# Patient Record
Sex: MALE | Race: WHITE | NOT HISPANIC OR LATINO | Employment: FULL TIME | URBAN - NONMETROPOLITAN AREA
[De-identification: names, ages, dates, MRNs, and addresses within clinical notes are randomized per-mention and may not be internally consistent; named-entity substitution may affect disease eponyms.]

---

## 2021-01-27 ENCOUNTER — HISTORICAL (OUTPATIENT)
Dept: ADMINISTRATIVE | Facility: HOSPITAL | Age: 66
End: 2021-01-27

## 2021-12-06 ENCOUNTER — HISTORICAL (OUTPATIENT)
Dept: ADMINISTRATIVE | Facility: HOSPITAL | Age: 66
End: 2021-12-06

## 2022-02-09 ENCOUNTER — HISTORICAL (OUTPATIENT)
Dept: ADMINISTRATIVE | Facility: HOSPITAL | Age: 67
End: 2022-02-09

## 2024-06-14 ENCOUNTER — HOSPITAL ENCOUNTER (INPATIENT)
Facility: HOSPITAL | Age: 69
LOS: 4 days | Discharge: ANOTHER HOSPITAL | DRG: 872 | End: 2024-06-18
Admitting: FAMILY MEDICINE
Payer: MEDICARE

## 2024-06-14 DIAGNOSIS — R65.20 SEVERE SEPSIS: Primary | ICD-10-CM

## 2024-06-14 DIAGNOSIS — R60.9 EDEMA: ICD-10-CM

## 2024-06-14 DIAGNOSIS — A41.9 SEVERE SEPSIS: Primary | ICD-10-CM

## 2024-06-14 DIAGNOSIS — R06.02 SHORTNESS OF BREATH: ICD-10-CM

## 2024-06-14 DIAGNOSIS — L03.116 CELLULITIS OF LEFT LOWER EXTREMITY: ICD-10-CM

## 2024-06-14 DIAGNOSIS — R06.00 DYSPNEA: ICD-10-CM

## 2024-06-14 LAB
ABS NEUT CALC (OHS): 24.15 X10(3)/MCL (ref 2.1–9.2)
ALBUMIN SERPL-MCNC: 4.5 G/DL (ref 3.4–5)
ALBUMIN/GLOB SERPL: 1.1 RATIO
ALP SERPL-CCNC: 69 UNIT/L (ref 50–144)
ALT SERPL-CCNC: 35 UNIT/L (ref 1–45)
AMORPH URATE CRY URNS QL MICRO: ABNORMAL /HPF
ANION GAP SERPL CALC-SCNC: 12 MEQ/L (ref 2–13)
AST SERPL-CCNC: 35 UNIT/L (ref 17–59)
BACTERIA #/AREA URNS AUTO: ABNORMAL /HPF
BASE EXCESS BLD CALC-SCNC: 0.9 MMOL/L (ref -2–2)
BILIRUB SERPL-MCNC: 1.2 MG/DL (ref 0–1)
BILIRUB UR QL STRIP.AUTO: NEGATIVE
BLOOD GAS SAMPLE TYPE (OHS): ABNORMAL
BNP BLD-MCNC: 3380 PG/ML (ref 0–124.9)
BUN SERPL-MCNC: 34 MG/DL (ref 7–20)
CALCIUM SERPL-MCNC: 9.2 MG/DL (ref 8.4–10.2)
CHLORIDE SERPL-SCNC: 101 MMOL/L (ref 98–110)
CLARITY UR: CLEAR
CO2 SERPL-SCNC: 24 MMOL/L (ref 21–32)
COHGB MFR BLDA: 1.4 % (ref 1–1.5)
COLOR UR AUTO: YELLOW
CREAT SERPL-MCNC: 1.69 MG/DL (ref 0.66–1.25)
CREAT/UREA NIT SERPL: 20 (ref 12–20)
D DIMER PPP IA.FEU-MCNC: 1.87 MG/L (ref 0.19–0.5)
ERYTHROCYTE [DISTWIDTH] IN BLOOD BY AUTOMATED COUNT: 13.3 %
GFR SERPLBLD CREATININE-BSD FMLA CKD-EPI: 43 ML/MIN/1.73/M2
GLOBULIN SER-MCNC: 4 GM/DL (ref 2–3.9)
GLUCOSE SERPL-MCNC: 166 MG/DL (ref 70–115)
GLUCOSE UR QL STRIP: NEGATIVE
HCO3 BLDA-SCNC: 24.6 MMOL/L (ref 23–28)
HCT VFR BLD AUTO: 45.1 % (ref 36–52)
HGB BLD-MCNC: 15.3 G/DL (ref 13–18)
HGB UR QL STRIP: ABNORMAL
HYALINE CASTS URNS QL MICRO: ABNORMAL /LPF
INHALED O2 CONCENTRATION: 21 %
INR PPP: 1.4
KETONES UR QL STRIP: NEGATIVE
LACTATE SERPL-SCNC: 2.3 MMOL/L (ref 0.4–2)
LACTATE SERPL-SCNC: 3.1 MMOL/L (ref 0.4–2)
LACTATE SERPL-SCNC: 3.3 MMOL/L (ref 0.4–2)
LEUKOCYTE ESTERASE UR QL STRIP: NEGATIVE
LYMPHOCYTES NFR BLD MANUAL: 2.1 X10(3)/MCL
LYMPHOCYTES NFR BLD MANUAL: 8 % (ref 20–55)
MAGNESIUM SERPL-MCNC: 1.8 MG/DL (ref 1.8–2.4)
MCH RBC QN AUTO: 31.8 PG (ref 27–34)
MCHC RBC AUTO-ENTMCNC: 33.9 G/DL (ref 31–37)
MCV RBC AUTO: 93.8 FL (ref 79–99)
METHGB MFR BLDA: 0.1 % (ref 0–1.5)
MUCOUS THREADS URNS QL MICRO: ABNORMAL /LPF
NEUTROPHILS NFR BLD MANUAL: 88 % (ref 37–73)
NEUTS BAND NFR BLD MANUAL: 4 % (ref 0–5)
NITRITE UR QL STRIP: NEGATIVE
NRBC BLD AUTO-RTO: 0.1 %
PCO2 BLDA: 36.2 MMHG (ref 41–51)
PH BLDA: 7.44 [PH] (ref 7.31–7.41)
PH UR STRIP: 5.5 [PH]
PLATELET # BLD AUTO: 197 X10(3)/MCL (ref 140–371)
PLATELET # BLD EST: ADEQUATE 10*3/UL
PMV BLD AUTO: 9.6 FL (ref 9.4–12.4)
PO2 BLDA: 36.4 MMHG (ref 30–40)
POCT GLUCOSE: 153 MG/DL (ref 70–110)
POTASSIUM SERPL-SCNC: 3.6 MMOL/L (ref 3.5–5.1)
PROT SERPL-MCNC: 8.5 GM/DL (ref 6.3–8.2)
PROT UR QL STRIP: 30
PROTHROMBIN TIME: 14.5 SECONDS (ref 9.3–11.9)
RBC # BLD AUTO: 4.81 X10(6)/MCL (ref 4–6)
RBC #/AREA URNS AUTO: ABNORMAL /HPF
RBC MORPH BLD: NORMAL
SAO2 % BLDA: 74.4 % (ref 60–80)
SMUDGE CELL (OLG): ABNORMAL
SODIUM SERPL-SCNC: 137 MMOL/L (ref 136–145)
SP GR UR STRIP.AUTO: 1.02 (ref 1–1.03)
SQUAMOUS #/AREA URNS AUTO: ABNORMAL /HPF
TROPONIN I SERPL-MCNC: 0.05 NG/ML (ref 0–0.03)
UROBILINOGEN UR STRIP-ACNC: 0.2
WBC # BLD AUTO: 26.25 X10(3)/MCL (ref 4–11.5)
WBC #/AREA URNS AUTO: ABNORMAL /HPF

## 2024-06-14 PROCEDURE — 83735 ASSAY OF MAGNESIUM: CPT | Performed by: FAMILY MEDICINE

## 2024-06-14 PROCEDURE — 25000003 PHARM REV CODE 250: Performed by: FAMILY MEDICINE

## 2024-06-14 PROCEDURE — 85610 PROTHROMBIN TIME: CPT | Performed by: FAMILY MEDICINE

## 2024-06-14 PROCEDURE — 21400001 HC TELEMETRY ROOM

## 2024-06-14 PROCEDURE — 63600175 PHARM REV CODE 636 W HCPCS: Performed by: FAMILY MEDICINE

## 2024-06-14 PROCEDURE — 83605 ASSAY OF LACTIC ACID: CPT

## 2024-06-14 PROCEDURE — 25000003 PHARM REV CODE 250

## 2024-06-14 PROCEDURE — 85027 COMPLETE CBC AUTOMATED: CPT | Performed by: FAMILY MEDICINE

## 2024-06-14 PROCEDURE — 93005 ELECTROCARDIOGRAM TRACING: CPT

## 2024-06-14 PROCEDURE — 94761 N-INVAS EAR/PLS OXIMETRY MLT: CPT | Mod: XB

## 2024-06-14 PROCEDURE — 36415 COLL VENOUS BLD VENIPUNCTURE: CPT

## 2024-06-14 PROCEDURE — 25000242 PHARM REV CODE 250 ALT 637 W/ HCPCS: Performed by: FAMILY MEDICINE

## 2024-06-14 PROCEDURE — 93010 ELECTROCARDIOGRAM REPORT: CPT | Mod: ,,, | Performed by: INTERNAL MEDICINE

## 2024-06-14 PROCEDURE — 80053 COMPREHEN METABOLIC PANEL: CPT | Performed by: FAMILY MEDICINE

## 2024-06-14 PROCEDURE — 81003 URINALYSIS AUTO W/O SCOPE: CPT | Performed by: FAMILY MEDICINE

## 2024-06-14 PROCEDURE — 81015 MICROSCOPIC EXAM OF URINE: CPT | Performed by: FAMILY MEDICINE

## 2024-06-14 PROCEDURE — 94640 AIRWAY INHALATION TREATMENT: CPT

## 2024-06-14 PROCEDURE — 25500020 PHARM REV CODE 255: Performed by: FAMILY MEDICINE

## 2024-06-14 PROCEDURE — 99900035 HC TECH TIME PER 15 MIN (STAT)

## 2024-06-14 PROCEDURE — 85379 FIBRIN DEGRADATION QUANT: CPT | Performed by: FAMILY MEDICINE

## 2024-06-14 PROCEDURE — 11000001 HC ACUTE MED/SURG PRIVATE ROOM

## 2024-06-14 PROCEDURE — 63600175 PHARM REV CODE 636 W HCPCS

## 2024-06-14 PROCEDURE — 87040 BLOOD CULTURE FOR BACTERIA: CPT | Performed by: FAMILY MEDICINE

## 2024-06-14 PROCEDURE — 82803 BLOOD GASES ANY COMBINATION: CPT

## 2024-06-14 PROCEDURE — 83605 ASSAY OF LACTIC ACID: CPT | Performed by: FAMILY MEDICINE

## 2024-06-14 PROCEDURE — 83880 ASSAY OF NATRIURETIC PEPTIDE: CPT | Performed by: FAMILY MEDICINE

## 2024-06-14 PROCEDURE — 84484 ASSAY OF TROPONIN QUANT: CPT | Performed by: FAMILY MEDICINE

## 2024-06-14 PROCEDURE — 99285 EMERGENCY DEPT VISIT HI MDM: CPT | Mod: 25

## 2024-06-14 PROCEDURE — 96365 THER/PROPH/DIAG IV INF INIT: CPT

## 2024-06-14 RX ORDER — ALBUTEROL SULFATE 90 UG/1
2 AEROSOL, METERED RESPIRATORY (INHALATION) EVERY 6 HOURS PRN
COMMUNITY

## 2024-06-14 RX ORDER — TAMSULOSIN HYDROCHLORIDE 0.4 MG/1
1 CAPSULE ORAL NIGHTLY
COMMUNITY
Start: 2024-06-07

## 2024-06-14 RX ORDER — FLUTICASONE FUROATE AND VILANTEROL TRIFENATATE 200; 25 UG/1; UG/1
1 POWDER RESPIRATORY (INHALATION) DAILY
COMMUNITY
Start: 2024-05-20

## 2024-06-14 RX ORDER — WARFARIN 2.5 MG/1
2.5 TABLET ORAL DAILY
COMMUNITY

## 2024-06-14 RX ORDER — INSULIN ASPART 100 [IU]/ML
0-5 INJECTION, SOLUTION INTRAVENOUS; SUBCUTANEOUS
Status: DISCONTINUED | OUTPATIENT
Start: 2024-06-14 | End: 2024-06-18 | Stop reason: HOSPADM

## 2024-06-14 RX ORDER — LISINOPRIL 10 MG/1
10 TABLET ORAL DAILY
Status: DISCONTINUED | OUTPATIENT
Start: 2024-06-15 | End: 2024-06-18 | Stop reason: HOSPADM

## 2024-06-14 RX ORDER — DEXTROMETHORPHAN HYDROBROMIDE, GUAIFENESIN 5; 100 MG/5ML; MG/5ML
650 LIQUID ORAL EVERY 8 HOURS
COMMUNITY

## 2024-06-14 RX ORDER — TORSEMIDE 20 MG/1
20 TABLET ORAL DAILY
Status: DISCONTINUED | OUTPATIENT
Start: 2024-06-15 | End: 2024-06-17

## 2024-06-14 RX ORDER — IBUPROFEN 200 MG
24 TABLET ORAL
Status: DISCONTINUED | OUTPATIENT
Start: 2024-06-14 | End: 2024-06-18 | Stop reason: HOSPADM

## 2024-06-14 RX ORDER — METOPROLOL SUCCINATE 50 MG/1
50 TABLET, EXTENDED RELEASE ORAL DAILY
Status: DISCONTINUED | OUTPATIENT
Start: 2024-06-15 | End: 2024-06-18 | Stop reason: HOSPADM

## 2024-06-14 RX ORDER — GLUCAGON 1 MG
1 KIT INJECTION
Status: DISCONTINUED | OUTPATIENT
Start: 2024-06-14 | End: 2024-06-18 | Stop reason: HOSPADM

## 2024-06-14 RX ORDER — TORSEMIDE 20 MG/1
20 TABLET ORAL DAILY
Status: ON HOLD | COMMUNITY
Start: 2024-06-06 | End: 2024-06-21 | Stop reason: HOSPADM

## 2024-06-14 RX ORDER — IBUPROFEN 400 MG/1
400 TABLET ORAL EVERY 6 HOURS PRN
Status: DISCONTINUED | OUTPATIENT
Start: 2024-06-14 | End: 2024-06-18 | Stop reason: HOSPADM

## 2024-06-14 RX ORDER — IBUPROFEN 200 MG
16 TABLET ORAL
Status: DISCONTINUED | OUTPATIENT
Start: 2024-06-14 | End: 2024-06-18 | Stop reason: HOSPADM

## 2024-06-14 RX ORDER — WARFARIN 2.5 MG/1
2.5 TABLET ORAL DAILY
Status: DISCONTINUED | OUTPATIENT
Start: 2024-06-15 | End: 2024-06-15

## 2024-06-14 RX ORDER — ATORVASTATIN CALCIUM 40 MG/1
40 TABLET, FILM COATED ORAL NIGHTLY
Status: DISCONTINUED | OUTPATIENT
Start: 2024-06-14 | End: 2024-06-18 | Stop reason: HOSPADM

## 2024-06-14 RX ORDER — ACETAMINOPHEN 325 MG/1
650 TABLET ORAL EVERY 8 HOURS PRN
Status: DISCONTINUED | OUTPATIENT
Start: 2024-06-14 | End: 2024-06-18 | Stop reason: HOSPADM

## 2024-06-14 RX ORDER — TAMSULOSIN HYDROCHLORIDE 0.4 MG/1
1 CAPSULE ORAL NIGHTLY
Status: DISCONTINUED | OUTPATIENT
Start: 2024-06-14 | End: 2024-06-18 | Stop reason: HOSPADM

## 2024-06-14 RX ORDER — ONDANSETRON HYDROCHLORIDE 2 MG/ML
4 INJECTION, SOLUTION INTRAVENOUS EVERY 8 HOURS PRN
Status: DISCONTINUED | OUTPATIENT
Start: 2024-06-14 | End: 2024-06-18 | Stop reason: HOSPADM

## 2024-06-14 RX ORDER — ALBUTEROL SULFATE 0.83 MG/ML
2.5 SOLUTION RESPIRATORY (INHALATION) EVERY 6 HOURS PRN
Status: DISCONTINUED | OUTPATIENT
Start: 2024-06-14 | End: 2024-06-18 | Stop reason: HOSPADM

## 2024-06-14 RX ORDER — CETIRIZINE HYDROCHLORIDE 10 MG/1
10 TABLET ORAL DAILY
Status: DISCONTINUED | OUTPATIENT
Start: 2024-06-15 | End: 2024-06-18 | Stop reason: HOSPADM

## 2024-06-14 RX ORDER — FLUTICASONE FUROATE AND VILANTEROL 200; 25 UG/1; UG/1
1 POWDER RESPIRATORY (INHALATION) DAILY
Status: DISCONTINUED | OUTPATIENT
Start: 2024-06-15 | End: 2024-06-14 | Stop reason: CLARIF

## 2024-06-14 RX ORDER — BUDESONIDE 0.5 MG/2ML
0.5 INHALANT ORAL EVERY 12 HOURS
Status: DISCONTINUED | OUTPATIENT
Start: 2024-06-14 | End: 2024-06-15

## 2024-06-14 RX ORDER — PROCHLORPERAZINE EDISYLATE 5 MG/ML
5 INJECTION INTRAMUSCULAR; INTRAVENOUS EVERY 6 HOURS PRN
Status: DISCONTINUED | OUTPATIENT
Start: 2024-06-14 | End: 2024-06-18 | Stop reason: HOSPADM

## 2024-06-14 RX ORDER — LISINOPRIL 10 MG/1
10 TABLET ORAL DAILY
Status: ON HOLD | COMMUNITY
Start: 2024-06-06 | End: 2024-06-21 | Stop reason: HOSPADM

## 2024-06-14 RX ORDER — HYDROCODONE BITARTRATE AND ACETAMINOPHEN 5; 325 MG/1; MG/1
1 TABLET ORAL EVERY 6 HOURS PRN
Status: DISCONTINUED | OUTPATIENT
Start: 2024-06-14 | End: 2024-06-18 | Stop reason: HOSPADM

## 2024-06-14 RX ORDER — METOPROLOL SUCCINATE 50 MG/1
50 TABLET, EXTENDED RELEASE ORAL DAILY
COMMUNITY
Start: 2024-06-06

## 2024-06-14 RX ORDER — ALBUTEROL SULFATE 90 UG/1
2 AEROSOL, METERED RESPIRATORY (INHALATION) EVERY 6 HOURS PRN
Status: DISCONTINUED | OUTPATIENT
Start: 2024-06-14 | End: 2024-06-14 | Stop reason: CLARIF

## 2024-06-14 RX ORDER — MINERAL OIL
180 ENEMA (ML) RECTAL DAILY
COMMUNITY

## 2024-06-14 RX ORDER — ARFORMOTEROL TARTRATE 15 UG/2ML
15 SOLUTION RESPIRATORY (INHALATION) 2 TIMES DAILY
Status: DISCONTINUED | OUTPATIENT
Start: 2024-06-14 | End: 2024-06-15

## 2024-06-14 RX ORDER — ATORVASTATIN CALCIUM 40 MG/1
40 TABLET, FILM COATED ORAL NIGHTLY
COMMUNITY
Start: 2024-06-06

## 2024-06-14 RX ADMIN — IBUPROFEN 400 MG: 400 TABLET, FILM COATED ORAL at 10:06

## 2024-06-14 RX ADMIN — SODIUM CHLORIDE, POTASSIUM CHLORIDE, SODIUM LACTATE AND CALCIUM CHLORIDE 1983 ML: 600; 310; 30; 20 INJECTION, SOLUTION INTRAVENOUS at 07:06

## 2024-06-14 RX ADMIN — PIPERACILLIN AND TAZOBACTAM 4.5 G: 4; .5 INJECTION, POWDER, FOR SOLUTION INTRAVENOUS; PARENTERAL at 05:06

## 2024-06-14 RX ADMIN — BUDESONIDE INHALATION 0.5 MG: 0.5 SUSPENSION RESPIRATORY (INHALATION) at 07:06

## 2024-06-14 RX ADMIN — TAMSULOSIN HYDROCHLORIDE 0.4 MG: 0.4 CAPSULE ORAL at 09:06

## 2024-06-14 RX ADMIN — ARFORMOTEROL TARTRATE 15 MCG: 15 SOLUTION RESPIRATORY (INHALATION) at 07:06

## 2024-06-14 RX ADMIN — ATORVASTATIN CALCIUM 40 MG: 40 TABLET, FILM COATED ORAL at 09:06

## 2024-06-14 RX ADMIN — SODIUM CHLORIDE 1983 ML: 9 INJECTION, SOLUTION INTRAVENOUS at 05:06

## 2024-06-14 RX ADMIN — VANCOMYCIN HYDROCHLORIDE 1500 MG: 1.5 INJECTION, POWDER, LYOPHILIZED, FOR SOLUTION INTRAVENOUS at 07:06

## 2024-06-14 RX ADMIN — VANCOMYCIN HYDROCHLORIDE 1000 MG: 1 INJECTION, POWDER, LYOPHILIZED, FOR SOLUTION INTRAVENOUS at 10:06

## 2024-06-14 RX ADMIN — IOHEXOL 60 ML: 350 INJECTION, SOLUTION INTRAVENOUS at 05:06

## 2024-06-14 NOTE — ED PROVIDER NOTES
Encounter Date: 6/14/2024       History     Chief Complaint   Patient presents with    Leg Swelling    Weakness    Shortness of Breath     Pt presented to ED per POV with c/o swelling to left lower leg, shortness of breath and generalized weakness onset one week ago. Pt was sent from PCP, Dr. Adalberto Unger for evaluation to r/o cellulitis vs DVT.     Patient presents with left leg erythema and swelling x1 week.  He is already on Coumadin for a history of atrial fibrillation.  He also relates nausea and vomiting for the past 3 days.  He had a single episode of diarrhea today he denies abdominal or chest pain.  He admits to mild shortness of breath.        Review of patient's allergies indicates:   Allergen Reactions    Cardizem [diltiazem hcl] Other (See Comments)     Past Medical History:   Diagnosis Date    A-fib     Allergy     Asthma     Kidney stone     Osteoarthritis     Osteoporosis      Past Surgical History:   Procedure Laterality Date    CATARACT EXTRACTION, BILATERAL       No family history on file.  Social History     Tobacco Use    Smoking status: Never   Substance Use Topics    Alcohol use: No    Drug use: No     Review of Systems   Constitutional:  Positive for appetite change and fatigue.   HENT: Negative.     Respiratory:  Positive for shortness of breath.    Gastrointestinal:  Positive for nausea and vomiting. Negative for abdominal pain.   Skin:  Positive for color change.       Physical Exam     Initial Vitals [06/14/24 1542]   BP Pulse Resp Temp SpO2   117/78 109 18 98.4 °F (36.9 °C) (!) 94 %      MAP       --         Physical Exam    Constitutional: He appears well-developed and well-nourished.   HENT:   Membranes   Cardiovascular:            Tachycardic, regular without murmurs gallops or rubs   Pulmonary/Chest:   Mildly diminished breath sounds.     Neurological: He is alert. He has normal strength.   Skin:   Left lower extremity with a erythema and increased warmth.  Areas of confluence as  well as swelling.  DP is 2+         ED Course   Critical Care    Date/Time: 6/14/2024 3:29 PM    Performed by: Pb Steele MD  Authorized by: Pb Steele MD  Direct patient critical care time: 18 minutes  Additional history critical care time: 8 minutes  Ordering / reviewing critical care time: 14 minutes  Documentation critical care time: 12 minutes  Consulting other physicians critical care time: 6 minutes  Consult with family critical care time: 5 minutes  Total critical care time (exclusive of procedural time) : 63 minutes  Critical care was necessary to treat or prevent imminent or life-threatening deterioration of the following conditions: sepsis.  Critical care was time spent personally by me on the following activities: development of treatment plan with patient or surrogate, discussions with primary provider, interpretation of cardiac output measurements, evaluation of patient's response to treatment, examination of patient, obtaining history from patient or surrogate, ordering and performing treatments and interventions, ordering and review of laboratory studies, ordering and review of radiographic studies, pulse oximetry and re-evaluation of patient's condition.        Labs Reviewed   COMPREHENSIVE METABOLIC PANEL - Abnormal; Notable for the following components:       Result Value    Glucose 166 (*)     Blood Urea Nitrogen 34 (*)     Creatinine 1.69 (*)     Protein Total 8.5 (*)     Globulin 4.0 (*)     Bilirubin Total 1.2 (*)     All other components within normal limits   NT-PRO NATRIURETIC PEPTIDE - Abnormal; Notable for the following components:    ProBNP 3,380.0 (*)     All other components within normal limits   D DIMER, QUANTITATIVE - Abnormal; Notable for the following components:    D-Dimer 1.87 (*)     All other components within normal limits   TROPONIN I - Abnormal; Notable for the following components:    Troponin-I 0.049 (*)     All other components within normal limits    CBC WITH DIFFERENTIAL - Abnormal; Notable for the following components:    WBC 26.25 (*)     All other components within normal limits   PROTIME-INR - Abnormal; Notable for the following components:    PT 14.5 (*)     All other components within normal limits    Narrative:     Protimes are used to monitor anticoagulant agents such as warfarin. PT INR values are based on the current patient normal mean and the SYLVIA value for the specific instrument reagent used.  **Routine theraputic target values for the INR are 2.0-3.0**   BLOOD GAS - Abnormal; Notable for the following components:    pH, Blood gas 7.442 (*)     pCO2, Blood gas 36.2 (*)     All other components within normal limits   LACTIC ACID, PLASMA - Abnormal; Notable for the following components:    Lactic Acid Level 2.3 (*)     All other components within normal limits   MANUAL DIFFERENTIAL - Abnormal; Notable for the following components:    Neutrophils % 88 (*)     Lymphs % 8 (*)     Neutrophils Abs Calc 24.15 (*)     Smudge Cells 1+ (*)     All other components within normal limits   URINALYSIS, REFLEX TO URINE CULTURE - Abnormal; Notable for the following components:    Protein, UA 30 (*)     Blood, UA Moderate (*)     All other components within normal limits    Narrative:      URINE STABILITY IS 2 HOURS AT ROOM TEMP OR    SIX HOURS REFRIGERATED. PERFORMING TESTING ON    SPECIMENS GREATER THAN THIS AGE MAY AFFECT THE    FOLLOWING TESTS:    PH          SPECIFIC GRAVITY           BLOOD    CLARITY     BILIRUBIN               UROBILINOGEN   MAGNESIUM - Normal   BLOOD CULTURE [facility]   BLOOD CULTURE [facility]   CBC W/ AUTO DIFFERENTIAL    Narrative:     The following orders were created for panel order CBC auto differential.  Procedure                               Abnormality         Status                     ---------                               -----------         ------                     CBC with Differential[[id number]]       Abnormal            Final  result               Manual Differential[[id number]]         Abnormal            Final result                 Please view results for these tests on the individual orders.   LACTIC ACID, PLASMA   URINALYSIS, MICROSCOPIC     EKG Readings: (Independently Interpreted)   Initial Reading: No STEMI. Rhythm: Sinus Tachycardia. Heart Rate: 102. Conduction: RBBB. ST Segments: Normal ST Segments. T Waves: Normal.       Imaging Results              CTA Chest Non-Coronary (PE Studies) (Final result)  Result time 06/14/24 17:37:49      Final result by Td Oliveira MD (06/14/24 17:37:49)                   Impression:      No evidence of acute pulmonary embolism is identified.    Fatty infiltration is noted in the liver.    Large hepatic cyst is again seen.    Remote mild wedge compression fracture of a midthoracic vertebral body is noted.      Electronically signed by: Td Oliveira  Date:    06/14/2024  Time:    17:37               Narrative:    EXAMINATION:  CTA CHEST NON CORONARY (PE STUDIES)    CLINICAL HISTORY:  Pulmonary embolism (PE) suspected, positive D-dimer;    TECHNIQUE:  Low dose axial images, sagittal and coronal reformations were obtained from the thoracic inlet to the lung bases following the IV administration of 100 mL of Omnipaque 350.  Contrast timing was optimized to evaluate the pulmonary arteries.  MIP images were performed.    Total DLP: 444 mGy.cm    Automatic exposure control was utilized to reduce the patient's dose    COMPARISON:  Chest x-ray dated February 9, 2022 and CT dated March 21, 2014.    FINDINGS:  No evidence of pulmonary embolism is identified on either side.    Elsewhere in the chest, the visualized portions of the thyroid lobes appear unremarkable.  No supraclavicular, axillary, mediastinal or hilar adenopathy identified.  The aorta appears normal in caliber.  The heart size is within normal limits.  No pericardial effusion is seen.    The lung fields appear clear.  No  pleural effusion or pneumothorax are noted.    The visualized portions of the upper abdomen demonstrate fatty infiltration of the liver.  A large hepatic cyst is again seen in the right lobe, similar to that noted on prior CT.  The adrenal glands are incompletely included on this examination but appear grossly normal.    Review of the bony structures demonstrates no acute abnormalities.  Degenerative changes are seen.  Remote compression fracture 1 the midthoracic vertebral bodies is again noted.                                       US Lower Extremity Veins Left (Final result)  Result time 06/14/24 16:54:28      Final result by Td Oliveira MD (06/14/24 16:54:28)                   Impression:      No evidence of deep venous thrombosis in the left lower extremity.      Electronically signed by: Td Oliveira  Date:    06/14/2024  Time:    16:54               Narrative:    EXAMINATION:  US LOWER EXTREMITY VEINS LEFT    CLINICAL HISTORY:  Edema, unspecified    TECHNIQUE:  Duplex and color flow Doppler evaluation and graded compression of the left lower extremity veins was performed.    COMPARISON:  December 6, 2021    FINDINGS:  Left thigh veins: The common femoral, femoral, popliteal, upper greater saphenous, and deep femoral veins are patent and free of thrombus. The veins are normally compressible and have normal phasic flow and augmentation response.    Left calf veins: The visualized calf veins are patent.    Miscellaneous: None                                       X-Ray Chest AP Portable (Preliminary result)  Result time 06/14/24 16:55:26      Wet Read by Pb Steele MD (06/14/24 16:55:26, [facility], Emergency Medicine)    No acute findings                                     Medications   sodium chloride 0.9% bolus 1,983 mL 1,983 mL (1,983 mLs Intravenous New Bag 6/14/24 1720)   piperacillin-tazobactam (ZOSYN) 4.5 g in dextrose 5 % in water (D5W) 100 mL IVPB  (MB+) (4.5 g Intravenous New Bag 6/14/24 1720)   iohexoL (OMNIPAQUE 350) injection 100 mL (60 mLs Intravenous Given 6/14/24 1713)     Medical Decision Making  Amount and/or Complexity of Data Reviewed  Labs: ordered.  Radiology: ordered and independent interpretation performed. Decision-making details documented in ED Course.    Risk  Decision regarding hospitalization.      Additional MDM:   Sepsis:   This patient does have evidence of infective focus  My overall impression is Severe sepsis.  Source: Skin and Soft Tissue (location LLE)  Antibiotics given- Antibiotics     Patient Encounter Information Not Found      Latest lactate reviewed- 2.3  Organ dysfunction indicated by none    Fluid challenge Ideal Body Weight- The patient's ideal body weight is [unfilled] which will be used to calculate fluid bolus of 30 ml/kg for treatment of septic shock.      Post- resuscitation assessment Yes Perfusion exam was performed within 6 hours of septic shock presentation after bolus shows Adequate tissue perfusion assessed by non-invasive monitoring       Will Not start Pressors- Levophed for MAP of 65  Source control achieved by: antibiotics                  ED Course as of 06/14/24 1748 Fri Jun 14, 2024   1740 CTA Chest Non-Coronary (PE Studies)  There is no evidence of a PE. [TM]      ED Course User Index  [TM] Demarco Ramon MD                           Clinical Impression:  Final diagnoses:  [R06.02] Shortness of breath  [R06.00] Dyspnea  [R60.9] Edema  [A41.9, R65.20] Severe sepsis (Primary)  [L03.116] Cellulitis of left lower extremity          ED Disposition Condition    Admit Stable                Demarco Ramon MD  06/14/24 2283

## 2024-06-14 NOTE — H&P
[facility]    History [facility] Physical      Patient Name: Reid Mcqueen  MRN: [id number]  Admission Date: 6/14/2024  Attending Physician: Reese Hong MD  Primary Care Provider: Adán Unger III, MD         Patient information was obtained from patient and ER records.     Subjective:     Principal Problem:Severe sepsis    Chief Complaint:   Chief Complaint   Patient presents with    Leg Swelling    Weakness    Shortness of Breath     Pt presented to ED per POV with c/o swelling to left lower leg, shortness of breath and generalized weakness onset one week ago. Pt was sent from PCP, Dr. Adlaberto Unger for evaluation to r/o cellulitis vs DVT.        HPI:   68 year old man with history of atrial fibrillation, COPD, HTN, BPH, HLD presented for lower extremity swelling/erythema for one week. Patient is on long term anticoagulation. Patient denies any trauma to bug bites in left lower extremity. Patient did report associated nausea/vomiting.     ED course: Blood cultures x2 obtained. CTA chest showed no PE. No DVT on US. Admitted for cellulitis     Past Medical History:   Diagnosis Date    A-fib     Allergy     Asthma     Kidney stone     Osteoarthritis     Osteoporosis        Past Surgical History:   Procedure Laterality Date    CATARACT EXTRACTION, BILATERAL         Review of patient's allergies indicates:   Allergen Reactions    Cardizem [diltiazem hcl] Other (See Comments)       No current facility-administered medications on file prior to encounter.     Current Outpatient Medications on File Prior to Encounter   Medication Sig    acetaminophen (TYLENOL) 650 MG TbSR Take 650 mg by mouth every 8 (eight) hours.    albuterol (PROVENTIL/VENTOLIN HFA) 90 mcg/actuation inhaler Inhale 2 puffs into the lungs every 6 (six) hours as needed for Wheezing. Rescue    atorvastatin (LIPITOR) 40 MG tablet Take 40 mg by mouth every evening.    BREO ELLIPTA 200-25 mcg/dose DsDv diskus inhaler Inhale 1  puff into the lungs once daily.    fexofenadine (ALLEGRA) 180 MG tablet Take 180 mg by mouth once daily.    lisinopriL 10 MG tablet Take 10 mg by mouth once daily.    metoprolol succinate (TOPROL-XL) 50 MG 24 hr tablet Take 50 mg by mouth once daily.    tamsulosin (FLOMAX) 0.4 mg Cap Take 1 capsule by mouth nightly.    torsemide (DEMADEX) 20 MG Tab Take 20 mg by mouth once daily.    warfarin (COUMADIN) 2.5 MG tablet Take 2.5 mg by mouth Daily.    [DISCONTINUED] alendronate (FOSAMAX) 70 MG tablet Take 70 mg by mouth every 7 days.    [DISCONTINUED] amoxicillin-clavulanate 875-125mg (AUGMENTIN) 875-125 mg per tablet Take 1 tablet by mouth 2 (two) times daily.    [DISCONTINUED] cyanocobalamin (VITAMIN B-12) 500 MCG tablet Take 500 mcg by mouth once daily.    [DISCONTINUED] fluticasone-salmeterol 100-50 mcg/dose (ADVAIR) 100-50 mcg/dose diskus inhaler Inhale 1 puff into the lungs 2 (two) times daily.    [DISCONTINUED] ipratropium (ATROVENT) 0.06 % nasal spray 2 sprays by Nasal route 4 (four) times daily as needed.    [DISCONTINUED] loratadine (CLARITIN) 10 mg tablet Take 10 mg by mouth once daily.    [DISCONTINUED] MULTIVITS,CA,MIN/IRON/FA/LYCOP (CENTRUM ULTRA MEN'S ORAL) Take 1 tablet by mouth once daily.    [DISCONTINUED] phenylephrine-pyrilamine (POLY HIST FORTE) 10-25 mg Tab Take 1 tablet by mouth 2 (two) times daily.     Family History    None       Tobacco Use    Smoking status: Never    Smokeless tobacco: Not on file   Substance and Sexual Activity    Alcohol use: No    Drug use: No    Sexual activity: Not on file     Review of Systems   All other systems reviewed and are negative.    Objective:     Vital Signs (Most Recent):  Temp: 98.4 °F (36.9 °C) (06/14/24 1742)  Pulse: 89 (06/14/24 1800)  Resp: (!) 25 (06/14/24 1800)  BP: 115/74 (06/14/24 1800)  SpO2: 96 % (06/14/24 1800) Vital Signs (24h Range):  Temp:  [98.4 °F (36.9 °C)-98.6 °F (37 °C)] 98.4 °F (36.9 °C)  Pulse:  [] 89  Resp:  [18-25] 25  SpO2:  [94  %-96 %] 96 %  BP: (106-123)/(61-78) 115/74     Weight: 118.9 kg (262 lb 2 oz)  Body mass index is 41.05 kg/m².    Physical Exam  Vitals reviewed. Exam conducted with a chaperone present.   Constitutional:       Appearance: Normal appearance. He is normal weight.   HENT:      Head: Normocephalic and atraumatic.      Nose: Nose normal.      Mouth/Throat:      Mouth: Mucous membranes are moist.      Pharynx: Oropharynx is clear.   Eyes:      Extraocular Movements: Extraocular movements intact.      Conjunctiva/sclera: Conjunctivae normal.      Pupils: Pupils are equal, round, and reactive to light.   Cardiovascular:      Rate and Rhythm: Normal rate and regular rhythm.      Pulses: Normal pulses.      Heart sounds: Normal heart sounds.   Pulmonary:      Effort: Pulmonary effort is normal.      Breath sounds: Normal breath sounds.   Abdominal:      General: Abdomen is flat. Bowel sounds are normal.      Palpations: Abdomen is soft.   Musculoskeletal:         General: Normal range of motion.      Cervical back: Normal range of motion and neck supple.   Skin:     Findings: Erythema present.   Neurological:      General: No focal deficit present.      Mental Status: He is alert. Mental status is at baseline.   Psychiatric:         Mood and Affect: Mood normal.         Thought Content: Thought content normal.           CRANIAL NERVES     CN III, IV, VI   Pupils are equal, round, and reactive to light.      Significant Labs: All pertinent labs within the past 24 hours have been reviewed.  Recent Lab Results  (Last 5 results in the past 24 hours)        06/14/24  1733   06/14/24  1624   06/14/24  1559   06/14/24  1556   06/14/24  1555        Albumin/Globulin Ratio         1.1       Neutrophils Abs Calc         24.15       Albumin         4.5       ALP         69       ALT         35       Amporphous Urate Crystals, UA Few               Anion Gap         12.0       Appearance, UA Clear               AST         35        Bacteria, UA Few               Bands         4       Bilirubin (UA) Negative               BILIRUBIN TOTAL         1.2       BUN         34       BUN/CREAT RATIO         20       Calcium         9.2       Chloride         101       CO2         24       Color, UA Yellow               Creatinine         1.69       D-Dimer         1.87       eGFR         43  Comment:                      EGFR INTERPRETATION    Beginning 8/15/22 we are reporting the eGFRcr calculation as recommended by the [employer] Kid[employer]. The eGFRcr equation has similar overall performance characteristics to the older equation, but the values may differ by more than 10% particularly at higher values of eGFRcr and younger adult ages.    NKF stages of chronic kidney disease (CKD)  Stage 1: Kidney damage with normal or increased eGFR (>90 mL/min/1.73 m^2)  Stage 2: Mild reduction in GFR (60-89 mL/min/1.73 m^2)  Stage 3a: Moderate reduction in GFR (45-59 mL/min/1.73 m^2)  Stage 3b: Moderate reduction in GFR (30-44 mL/min/1.73 m^2)  Stage 4: Severe reduction in GFR (15-29 mL/min/1.73 m^2)  Stage 5: Kidney failure (GFR <15 mL/min/1.73 m^2)           FIO2, Blood gas   21.0             Globulin, Total         4.0       Glucose         166       Glucose, UA Negative               Hematocrit         45.1       Hemoglobin         15.3       Hyaline Casts, UA Moderate               INR       1.4         Ketones, UA Negative               Lactic Acid Level     2.3           Leukocyte Esterase, UA Negative               Lymph #         2.1       Lymphocyte %         8       Magnesium          1.80       MCH         31.8       MCHC         33.9       MCV         93.8       MPV         9.6       Mucous, UA Small               Neutrophils Relative         88       NITRITE UA Negative               nRBC         0.1       Blood, UA Moderate               pH, UA 5.5               Platelet Estimate         Adequate       Platelet Count         197       Base  "Excess, Blood gas   0.90             CO Hgb   1.4             POC HCO3   24.6             Met Hgb   0.1             POC PCO2   36.2             POC PH   7.442             POC PO2   36.4             Potassium         3.6       ProBNP         3,380.0  Comment:   For ambulatory patients presenting to outpatient facilities with clinical suspicion of HF not previously diagnosed and at least one sign, symptom or risk factor for HF, NT-proBNP II test results should be interpreted as indicated below:    <125(pg/mL):"Negative: Heart Failure Unlikely"    >=125(pg/mL):"Consider Heart Failure as well as other causes of NT-proBNP elevation"             PROTEIN TOTAL         8.5       Protein, UA 30               PT       14.5         RBC         4.81       RBC Morph         Normal       RBC, UA 0-5               RDW         13.3       Sample Type   Venous Blood             Smudge Cells         1+       sO2, Blood gas   74.4             Sodium         137       Specific Gravity,UA 1.025               Squam Epithel, UA Few               Troponin I         0.049       Urobilinogen, UA 0.2               WBC, UA 0-5               WBC         26.25                              Significant Imaging: I have reviewed all pertinent imaging results/findings within the past 24 hours.  I have reviewed and interpreted all pertinent imaging results/findings within the past 24 hours.    Assessment/Plan:     Active Diagnoses:    Diagnosis Date Noted POA    PRINCIPAL PROBLEM:  Severe sepsis [A41.9, R65.20] 06/14/2024 Unknown    Cellulitis of left lower extremity [L03.116] 06/14/2024 Unknown    Dyspnea [R06.00] 06/14/2024 Unknown      Problems Resolved During this Admission:     VTE Risk Mitigation (From admission, onward)      None            *Sepsis 2/2 LLE cellulitis  - Blood cultures x2 obtained  - Cardiac monitoring   - Started on vanc/zosyn     *Dyspnea on exertion  - CTA chest showed no PE  - Cardiac monitoring   - Ordered echocardiogram   - " Continue torsemide for now     *HLD  - Resumed home statin    *DM2  - ISS while in house, goal glucose 140-180    *HTN  - Resumed home lisinopril and metoprolol    *BPH  - Resumed home flomax    *Atrial Fibrillation  - Resumed home xarelto and BB  - Cardiac monitoring     Code Status: Full Code  Diet: Cardiac  DVT PPX: Warfarin    Services provided via two way audio/visual telecommunication  Provider location: [place], [place]  Patient location: RACHAEL Ponce MD  Department of Hospital Medicine   [facility]

## 2024-06-14 NOTE — ED NOTES
A new connect request was successfully created for:  Reid Mcqueen  : 1955  MRN: [id number]  ConnectID: [id number]  REASON:  Admit: non-ICU  ACUITY:  10 Minutes  SUBMITTED:  2024 17:45 CDT

## 2024-06-15 LAB
ALBUMIN SERPL-MCNC: 3.3 G/DL (ref 3.4–5)
ALBUMIN/GLOB SERPL: 1 RATIO
ALP SERPL-CCNC: 61 UNIT/L (ref 50–144)
ALT SERPL-CCNC: 24 UNIT/L (ref 1–45)
ANION GAP SERPL CALC-SCNC: 6 MEQ/L (ref 2–13)
AST SERPL-CCNC: 32 UNIT/L (ref 17–59)
BASOPHILS # BLD AUTO: 0.03 X10(3)/MCL (ref 0.01–0.08)
BASOPHILS NFR BLD AUTO: 0.1 % (ref 0.1–1.2)
BILIRUB SERPL-MCNC: 1.5 MG/DL (ref 0–1)
BUN SERPL-MCNC: 28 MG/DL (ref 7–20)
CALCIUM SERPL-MCNC: 8 MG/DL (ref 8.4–10.2)
CHLORIDE SERPL-SCNC: 103 MMOL/L (ref 98–110)
CO2 SERPL-SCNC: 24 MMOL/L (ref 21–32)
CREAT SERPL-MCNC: 1.43 MG/DL (ref 0.66–1.25)
CREAT/UREA NIT SERPL: 20 (ref 12–20)
EOSINOPHIL # BLD AUTO: 0 X10(3)/MCL (ref 0.04–0.54)
EOSINOPHIL NFR BLD AUTO: 0 % (ref 0.7–7)
ERYTHROCYTE [DISTWIDTH] IN BLOOD BY AUTOMATED COUNT: 13.8 %
GFR SERPLBLD CREATININE-BSD FMLA CKD-EPI: 53 ML/MIN/1.73/M2
GLOBULIN SER-MCNC: 3.3 GM/DL (ref 2–3.9)
GLUCOSE SERPL-MCNC: 154 MG/DL (ref 70–115)
HCT VFR BLD AUTO: 38.3 % (ref 36–52)
HGB BLD-MCNC: 12.7 G/DL (ref 13–18)
IMM GRANULOCYTES # BLD AUTO: 0.1 X10(3)/MCL (ref 0–0.03)
IMM GRANULOCYTES NFR BLD AUTO: 0.5 % (ref 0–0.5)
LYMPHOCYTES # BLD AUTO: 1.28 X10(3)/MCL (ref 1.32–3.57)
LYMPHOCYTES NFR BLD AUTO: 5.9 % (ref 20–55)
MCH RBC QN AUTO: 31.8 PG (ref 27–34)
MCHC RBC AUTO-ENTMCNC: 33.2 G/DL (ref 31–37)
MCV RBC AUTO: 95.8 FL (ref 79–99)
MONOCYTES # BLD AUTO: 0.9 X10(3)/MCL (ref 0.3–0.82)
MONOCYTES NFR BLD AUTO: 4.2 % (ref 4.7–12.5)
NEUTROPHILS # BLD AUTO: 19.21 X10(3)/MCL (ref 1.78–5.38)
NEUTROPHILS NFR BLD AUTO: 89.3 % (ref 37–73)
NRBC BLD AUTO-RTO: 0 %
PLATELET # BLD AUTO: 158 X10(3)/MCL (ref 140–371)
PMV BLD AUTO: 9.9 FL (ref 9.4–12.4)
POTASSIUM SERPL-SCNC: 3.9 MMOL/L (ref 3.5–5.1)
PROT SERPL-MCNC: 6.6 GM/DL (ref 6.3–8.2)
RBC # BLD AUTO: 4 X10(6)/MCL (ref 4–6)
SODIUM SERPL-SCNC: 133 MMOL/L (ref 136–145)
VANCOMYCIN SERPL-MCNC: 9.6 UG/ML (ref 5–10)
WBC # BLD AUTO: 21.52 X10(3)/MCL (ref 4–11.5)

## 2024-06-15 PROCEDURE — 25000003 PHARM REV CODE 250: Performed by: INTERNAL MEDICINE

## 2024-06-15 PROCEDURE — 63600175 PHARM REV CODE 636 W HCPCS: Performed by: FAMILY MEDICINE

## 2024-06-15 PROCEDURE — 21400001 HC TELEMETRY ROOM

## 2024-06-15 PROCEDURE — 94640 AIRWAY INHALATION TREATMENT: CPT

## 2024-06-15 PROCEDURE — 99900035 HC TECH TIME PER 15 MIN (STAT)

## 2024-06-15 PROCEDURE — 25000003 PHARM REV CODE 250

## 2024-06-15 PROCEDURE — 80202 ASSAY OF VANCOMYCIN: CPT | Performed by: FAMILY MEDICINE

## 2024-06-15 PROCEDURE — 11000001 HC ACUTE MED/SURG PRIVATE ROOM

## 2024-06-15 PROCEDURE — 63600175 PHARM REV CODE 636 W HCPCS: Mod: JZ,JG | Performed by: INTERNAL MEDICINE

## 2024-06-15 PROCEDURE — 80053 COMPREHEN METABOLIC PANEL: CPT

## 2024-06-15 PROCEDURE — 25000242 PHARM REV CODE 250 ALT 637 W/ HCPCS: Performed by: FAMILY MEDICINE

## 2024-06-15 PROCEDURE — 94761 N-INVAS EAR/PLS OXIMETRY MLT: CPT

## 2024-06-15 PROCEDURE — 25000003 PHARM REV CODE 250: Performed by: FAMILY MEDICINE

## 2024-06-15 PROCEDURE — 63600175 PHARM REV CODE 636 W HCPCS

## 2024-06-15 PROCEDURE — 85025 COMPLETE CBC W/AUTO DIFF WBC: CPT

## 2024-06-15 PROCEDURE — 36415 COLL VENOUS BLD VENIPUNCTURE: CPT

## 2024-06-15 PROCEDURE — 36415 COLL VENOUS BLD VENIPUNCTURE: CPT | Performed by: FAMILY MEDICINE

## 2024-06-15 RX ORDER — WARFARIN 2.5 MG/1
2.5 TABLET ORAL DAILY
Status: DISCONTINUED | OUTPATIENT
Start: 2024-06-15 | End: 2024-06-18 | Stop reason: HOSPADM

## 2024-06-15 RX ORDER — FLUTICASONE FUROATE AND VILANTEROL 200; 25 UG/1; UG/1
1 POWDER RESPIRATORY (INHALATION) DAILY
Status: DISCONTINUED | OUTPATIENT
Start: 2024-06-16 | End: 2024-06-18 | Stop reason: HOSPADM

## 2024-06-15 RX ORDER — CLINDAMYCIN PHOSPHATE 600 MG/50ML
600 INJECTION, SOLUTION INTRAVENOUS
Status: DISCONTINUED | OUTPATIENT
Start: 2024-06-15 | End: 2024-06-18 | Stop reason: HOSPADM

## 2024-06-15 RX ORDER — FLUTICASONE FUROATE AND VILANTEROL 200; 25 UG/1; UG/1
1 POWDER RESPIRATORY (INHALATION) DAILY
Status: DISCONTINUED | OUTPATIENT
Start: 2024-06-15 | End: 2024-06-15

## 2024-06-15 RX ADMIN — BUDESONIDE INHALATION 0.5 MG: 0.5 SUSPENSION RESPIRATORY (INHALATION) at 07:06

## 2024-06-15 RX ADMIN — TORSEMIDE 20 MG: 20 TABLET ORAL at 09:06

## 2024-06-15 RX ADMIN — VANCOMYCIN HYDROCHLORIDE 1250 MG: 1.25 INJECTION, POWDER, LYOPHILIZED, FOR SOLUTION INTRAVENOUS at 11:06

## 2024-06-15 RX ADMIN — TAMSULOSIN HYDROCHLORIDE 0.4 MG: 0.4 CAPSULE ORAL at 08:06

## 2024-06-15 RX ADMIN — CLINDAMYCIN PHOSPHATE 600 MG: 600 INJECTION, SOLUTION INTRAVENOUS at 05:06

## 2024-06-15 RX ADMIN — HYDROCODONE BITARTRATE AND ACETAMINOPHEN 1 TABLET: 5; 325 TABLET ORAL at 10:06

## 2024-06-15 RX ADMIN — VANCOMYCIN HYDROCHLORIDE 1250 MG: 1.25 INJECTION, POWDER, LYOPHILIZED, FOR SOLUTION INTRAVENOUS at 01:06

## 2024-06-15 RX ADMIN — PIPERACILLIN AND TAZOBACTAM 4.5 G: 4; .5 INJECTION, POWDER, FOR SOLUTION INTRAVENOUS; PARENTERAL at 04:06

## 2024-06-15 RX ADMIN — ACETAMINOPHEN 650 MG: 325 TABLET, FILM COATED ORAL at 02:06

## 2024-06-15 RX ADMIN — PIPERACILLIN AND TAZOBACTAM 4.5 G: 4; .5 INJECTION, POWDER, FOR SOLUTION INTRAVENOUS; PARENTERAL at 08:06

## 2024-06-15 RX ADMIN — CLINDAMYCIN PHOSPHATE 600 MG: 600 INJECTION, SOLUTION INTRAVENOUS at 10:06

## 2024-06-15 RX ADMIN — ARFORMOTEROL TARTRATE 15 MCG: 15 SOLUTION RESPIRATORY (INHALATION) at 07:06

## 2024-06-15 RX ADMIN — METOPROLOL SUCCINATE 50 MG: 50 TABLET, EXTENDED RELEASE ORAL at 09:06

## 2024-06-15 RX ADMIN — PIPERACILLIN AND TAZOBACTAM 4.5 G: 4; .5 INJECTION, POWDER, FOR SOLUTION INTRAVENOUS; PARENTERAL at 12:06

## 2024-06-15 RX ADMIN — LISINOPRIL 10 MG: 10 TABLET ORAL at 09:06

## 2024-06-15 RX ADMIN — ATORVASTATIN CALCIUM 40 MG: 40 TABLET, FILM COATED ORAL at 08:06

## 2024-06-15 RX ADMIN — CETIRIZINE HYDROCHLORIDE 10 MG: 10 TABLET, FILM COATED ORAL at 09:06

## 2024-06-15 RX ADMIN — WARFARIN SODIUM 2.5 MG: 2.5 TABLET ORAL at 10:06

## 2024-06-15 NOTE — SUBJECTIVE & OBJECTIVE
Interval History:     Review of Systems   Constitutional:  Positive for fever. Negative for activity change, appetite change and fatigue.   HENT:  Negative for congestion, ear pain, nosebleeds, sinus pressure, sore throat, tinnitus and trouble swallowing.    Eyes:  Negative for pain and itching.   Respiratory:  Negative for apnea, cough, choking, chest tightness, shortness of breath and wheezing.    Cardiovascular:  Negative for chest pain and leg swelling.   Gastrointestinal:  Negative for abdominal distention, abdominal pain, constipation, diarrhea, nausea and vomiting.   Endocrine: Negative for cold intolerance and heat intolerance.   Genitourinary:  Negative for dysuria, enuresis, frequency, penile swelling, scrotal swelling, testicular pain and urgency.   Musculoskeletal:  Negative for arthralgias, back pain, gait problem, joint swelling, myalgias and neck pain.   Skin:  Positive for color change and rash. Negative for pallor.   Allergic/Immunologic: Negative for environmental allergies and food allergies.   Neurological:  Negative for dizziness, tremors, seizures, syncope, numbness and headaches.   Psychiatric/Behavioral:  Negative for agitation, behavioral problems, dysphoric mood, hallucinations, self-injury and suicidal ideas. The patient is not nervous/anxious and is not hyperactive.      Objective:     Vital Signs (Most Recent):  Temp: 97.6 °F (36.4 °C) (06/15/24 0734)  Pulse: 85 (06/15/24 0745)  Resp: 16 (06/15/24 0745)  BP: 102/65 (06/15/24 0734)  SpO2: 96 % (06/15/24 0745) Vital Signs (24h Range):  Temp:  [97.4 °F (36.3 °C)-101.9 °F (38.8 °C)] 97.6 °F (36.4 °C)  Pulse:  [] 85  Resp:  [16-26] 16  SpO2:  [91 %-97 %] 96 %  BP: ()/(54-78) 102/65     Weight: 115.9 kg (255 lb 9.6 oz)  Body mass index is 40.03 kg/m².    Intake/Output Summary (Last 24 hours) at 6/15/2024 0803  Last data filed at 6/15/2024 0734  Gross per 24 hour   Intake 1080 ml   Output --   Net 1080 ml         Physical  Exam  Constitutional:       General: He is not in acute distress.     Appearance: Normal appearance. He is ill-appearing. He is not toxic-appearing or diaphoretic.   HENT:      Head: Normocephalic and atraumatic.      Right Ear: External ear normal.      Left Ear: External ear normal.      Nose: Nose normal. No congestion or rhinorrhea.      Mouth/Throat:      Mouth: Mucous membranes are moist.      Pharynx: Oropharynx is clear.   Eyes:      Extraocular Movements: Extraocular movements intact.      Conjunctiva/sclera: Conjunctivae normal.      Pupils: Pupils are equal, round, and reactive to light.   Neck:      Vascular: No carotid bruit.   Cardiovascular:      Rate and Rhythm: Normal rate and regular rhythm.      Pulses: Normal pulses.      Heart sounds: Normal heart sounds. No murmur heard.  Pulmonary:      Effort: Pulmonary effort is normal. No respiratory distress.      Breath sounds: Normal breath sounds. No wheezing, rhonchi or rales.   Abdominal:      General: Abdomen is flat. Bowel sounds are normal. There is no distension.      Palpations: Abdomen is soft.      Tenderness: There is no abdominal tenderness. There is no guarding.   Musculoskeletal:         General: Swelling present. No tenderness. Normal range of motion.      Cervical back: Normal range of motion and neck supple. No tenderness.      Right lower leg: No edema.      Left lower leg: No edema.   Lymphadenopathy:      Cervical: No cervical adenopathy.   Skin:     General: Skin is warm and dry.      Coloration: Skin is not jaundiced or pale.      Findings: Erythema present.   Neurological:      General: No focal deficit present.      Mental Status: He is alert and oriented to person, place, and time. Mental status is at baseline.      Cranial Nerves: No cranial nerve deficit.      Motor: No weakness.      Coordination: Coordination normal.      Gait: Gait normal.   Psychiatric:         Mood and Affect: Mood normal.         Behavior: Behavior  normal.         Thought Content: Thought content normal.         Judgment: Judgment normal.             Significant Labs: All pertinent labs within the past 24 hours have been reviewed.    Significant Imaging: I have reviewed all pertinent imaging results/findings within the past 24 hours.

## 2024-06-15 NOTE — PROGRESS NOTES
Pharmacokinetic Initial Assessment: IV Vancomycin    Assessment/Plan:    Initiate intravenous vancomycin with loading dose of 2500 mg once followed by a maintenance dose of vancomycin 1250mg IV every 12 hours  Desired empiric serum trough concentration is 10 to 15 mcg/mL  Draw vancomycin 6-16-24 at 1130AM  Pharmacy will continue to follow and monitor vancomycin.      Please contact pharmacy at extension [phone] with any questions regarding this assessment.     Thank you for the consult,   Tam Courtney       Patient brief summary:  Reid Mcqueen is a 68 y.o. male initiated on antimicrobial therapy with IV Vancomycin for treatment of suspected skin & soft tissue infection    Drug Allergies:   Review of patient's allergies indicates:   Allergen Reactions    Cardizem [diltiazem hcl] Other (See Comments)       Actual Body Weight:   116KG    Renal Function:   Estimated Creatinine Clearance: 59.3 mL/min (A) (based on SCr of 1.43 mg/dL (H)).,     Dialysis Method (if applicable):  N/A    CBC (last 72 hours):  Recent Labs   Lab Result Units 06/14/24  1555 06/15/24  0715   WBC x10(3)/mcL 26.25* 21.52*   Hgb g/dL 15.3 12.7*   Hct % 45.1 38.3   Platelet x10(3)/mcL 197 158   Mono % %  --  4.2*   Eos % %  --  0.0*   Basophil % %  --  0.1       Metabolic Panel (last 72 hours):  Recent Labs   Lab Result Units 06/14/24  1555 06/14/24  1733 06/15/24  0430   Sodium mmol/L 137  --  133*   Potassium mmol/L 3.6  --  3.9   Chloride mmol/L 101  --  103   CO2 mmol/L 24  --  24   Glucose mg/dL 166*  --  154*   Glucose, UA   --  Negative  --    Blood Urea Nitrogen mg/dL 34*  --  28*   Creatinine mg/dL 1.69*  --  1.43*   Albumin g/dL 4.5  --  3.3*   Bilirubin Total mg/dL 1.2*  --  1.5*   ALP unit/L 69  --  61   AST unit/L 35  --  32   ALT unit/L 35  --  24   Magnesium Level mg/dL 1.80  --   --        Drug levels (last 3 results):  Recent Labs   Lab Result Units 06/15/24  1125   Vancomycin Random ug/ml 9.6       Microbiologic  Results:  Microbiology Results (last 7 days)       Procedure Component Value Units Date/Time    Blood Culture x two cultures. Draw prior to antibiotics [[id number]] Collected: 06/14/24 1756    Order Status: Resulted Specimen: Blood Updated: 06/14/24 1758    Blood Culture x two cultures. Draw prior to antibiotics [[id number]] Collected: 06/14/24 1756    Order Status: Resulted Specimen: Blood Updated: 06/14/24 1758             Detail Level: Detailed

## 2024-06-15 NOTE — PROGRESS NOTES
"Pharmacokinetic Initial Assessment: IV Vancomycin    Assessment/Plan:    Initiate intravenous vancomycin with loading dose of 2500 mg once with subsequent doses when random concentrations are less than 20 mcg/mL  Desired empiric serum trough concentration is 10 to 20 mcg/mL  Draw vancomycin random level on 6/15 at 1100.  Pharmacy will continue to follow and monitor vancomycin.      Please contact pharmacy with any questions regarding this assessment.     Thank you for the consult,   Harper Josh       Patient brief summary:  Reid Mcqueen is a 68 y.o. male initiated on antimicrobial therapy with IV Vancomycin for treatment of suspected skin & soft tissue infection    Drug Allergies:   Review of patient's allergies indicates:   Allergen Reactions    Cardizem [diltiazem hcl] Other (See Comments)       Actual Body Weight:   115.9kg    Renal Function:   Estimated Creatinine Clearance: 50.2 mL/min (A) (based on SCr of 1.69 mg/dL (H)).,     Dialysis Method (if applicable):  N/A    CBC (last 72 hours):  Recent Labs   Lab Result Units 06/14/24  1555   WBC x10(3)/mcL 26.25*   Hgb g/dL 15.3   Hct % 45.1   Platelet x10(3)/mcL 197       Metabolic Panel (last 72 hours):  Recent Labs   Lab Result Units 06/14/24  1555 06/14/24  1733   Sodium mmol/L 137  --    Potassium mmol/L 3.6  --    Chloride mmol/L 101  --    CO2 mmol/L 24  --    Glucose mg/dL 166*  --    Glucose, UA   --  Negative   Blood Urea Nitrogen mg/dL 34*  --    Creatinine mg/dL 1.69*  --    Albumin g/dL 4.5  --    Bilirubin Total mg/dL 1.2*  --    ALP unit/L 69  --    AST unit/L 35  --    ALT unit/L 35  --    Magnesium Level mg/dL 1.80  --        Drug levels (last 3 results):  No results for input(s): "VANCOMYCINRA", "VANCORANDOM", "VANCOMYCINPE", "VANCOPEAK", "VANCOMYCINTR", "VANCOTROUGH" in the last 72 hours.    Microbiologic Results:  Microbiology Results (last 7 days)       Procedure Component Value Units Date/Time    Blood Culture x two cultures. Draw prior " to antibiotics [[id number]] Collected: 06/14/24 1756    Order Status: Resulted Specimen: Blood Updated: 06/14/24 1758    Blood Culture x two cultures. Draw prior to antibiotics [[id number]] Collected: 06/14/24 1756    Order Status: Resulted Specimen: Blood Updated: 06/14/24 1758

## 2024-06-15 NOTE — PLAN OF CARE
Problem: Adult Inpatient Plan of Care  Goal: Plan of Care Review  Outcome: Progressing  Goal: Patient-Specific Goal (Individualized)  Outcome: Progressing  Goal: Absence of Hospital-Acquired Illness or Injury  Outcome: Progressing  Goal: Optimal Comfort and Wellbeing  Outcome: Progressing  Goal: Readiness for Transition of Care  Outcome: Progressing     Problem: Bariatric Environmental Safety  Goal: Safety Maintained with Care  Outcome: Progressing     Problem: Sepsis/Septic Shock  Goal: Optimal Coping  Outcome: Progressing  Goal: Absence of Bleeding  Outcome: Progressing  Goal: Blood Glucose Level Within Targeted Range  Outcome: Progressing  Goal: Absence of Infection Signs and Symptoms  Outcome: Progressing  Goal: Optimal Nutrition Intake  Outcome: Progressing     Problem: Fall Injury Risk  Goal: Absence of Fall and Fall-Related Injury  Outcome: Progressing     Problem: Wound  Goal: Optimal Coping  Outcome: Progressing  Goal: Optimal Functional Ability  Outcome: Progressing  Goal: Absence of Infection Signs and Symptoms  Outcome: Progressing  Goal: Improved Oral Intake  Outcome: Progressing  Goal: Optimal Pain Control and Function  Outcome: Progressing  Goal: Skin Health and Integrity  Outcome: Progressing  Goal: Optimal Wound Healing  Outcome: Progressing     Problem: Skin or Soft Tissue Infection  Goal: Absence of Infection Signs and Symptoms  Outcome: Progressing

## 2024-06-15 NOTE — NURSING
Patient arrived to the floor via stretcher accompanied by transport personnel. Patient in stable condition and denies further needs at this time.

## 2024-06-15 NOTE — PROGRESS NOTES
[facility] Medicine  Progress Note    Patient Name: Reid Mcqueen  MRN: [id number]  Patient Class: IP- Inpatient   Admission Date: 6/14/2024  Length of Stay: 1 days  Attending Physician: Adrian Milian MD  Primary Care Provider: Adán Unger III, MD        Subjective:     Principal Problem:Severe sepsis        HPI:  68 year old man with history of atrial fibrillation, COPD, HTN, BPH, HLD presented for lower extremity swelling/erythema for one week. Patient is on long term anticoagulation. Patient denies any trauma to bug bites in left lower extremity. Patient did report associated nausea/vomiting.      ED course: Blood cultures x2 obtained. CTA chest showed no PE. No DVT on US. Admitted for cellulitis           Past Medical History:   Diagnosis Date    A-fib      Allergy      Asthma      Kidney stone      Osteoarthritis      Osteoporosis        Overview/Hospital Course:  06/15/2024 patient in with severe cellulitis left lower extremity he states it continues to hurt severely redness has not changed much overnight.  We will continue his antibiotics follow-up blood cultures.  Ultrasound negative for DVT.    Interval History:     Review of Systems   Constitutional:  Positive for fever. Negative for activity change, appetite change and fatigue.   HENT:  Negative for congestion, ear pain, nosebleeds, sinus pressure, sore throat, tinnitus and trouble swallowing.    Eyes:  Negative for pain and itching.   Respiratory:  Negative for apnea, cough, choking, chest tightness, shortness of breath and wheezing.    Cardiovascular:  Negative for chest pain and leg swelling.   Gastrointestinal:  Negative for abdominal distention, abdominal pain, constipation, diarrhea, nausea and vomiting.   Endocrine: Negative for cold intolerance and heat intolerance.   Genitourinary:  Negative for dysuria, enuresis, frequency, penile swelling, scrotal swelling, testicular pain and urgency.    Musculoskeletal:  Negative for arthralgias, back pain, gait problem, joint swelling, myalgias and neck pain.   Skin:  Positive for color change and rash. Negative for pallor.   Allergic/Immunologic: Negative for environmental allergies and food allergies.   Neurological:  Negative for dizziness, tremors, seizures, syncope, numbness and headaches.   Psychiatric/Behavioral:  Negative for agitation, behavioral problems, dysphoric mood, hallucinations, self-injury and suicidal ideas. The patient is not nervous/anxious and is not hyperactive.      Objective:     Vital Signs (Most Recent):  Temp: 97.6 °F (36.4 °C) (06/15/24 0734)  Pulse: 85 (06/15/24 0745)  Resp: 16 (06/15/24 0745)  BP: 102/65 (06/15/24 0734)  SpO2: 96 % (06/15/24 0745) Vital Signs (24h Range):  Temp:  [97.4 °F (36.3 °C)-101.9 °F (38.8 °C)] 97.6 °F (36.4 °C)  Pulse:  [] 85  Resp:  [16-26] 16  SpO2:  [91 %-97 %] 96 %  BP: ()/(54-78) 102/65     Weight: 115.9 kg (255 lb 9.6 oz)  Body mass index is 40.03 kg/m².    Intake/Output Summary (Last 24 hours) at 6/15/2024 0847  Last data filed at 6/15/2024 0734  Gross per 24 hour   Intake 1080 ml   Output --   Net 1080 ml         Physical Exam  Constitutional:       General: He is not in acute distress.     Appearance: Normal appearance. He is ill-appearing. He is not toxic-appearing or diaphoretic.   HENT:      Head: Normocephalic and atraumatic.      Right Ear: External ear normal.      Left Ear: External ear normal.      Nose: Nose normal. No congestion or rhinorrhea.      Mouth/Throat:      Mouth: Mucous membranes are moist.      Pharynx: Oropharynx is clear.   Eyes:      Extraocular Movements: Extraocular movements intact.      Conjunctiva/sclera: Conjunctivae normal.      Pupils: Pupils are equal, round, and reactive to light.   Neck:      Vascular: No carotid bruit.   Cardiovascular:      Rate and Rhythm: Normal rate and regular rhythm.      Pulses: Normal pulses.      Heart sounds: Normal heart  sounds. No murmur heard.  Pulmonary:      Effort: Pulmonary effort is normal. No respiratory distress.      Breath sounds: Normal breath sounds. No wheezing, rhonchi or rales.   Abdominal:      General: Abdomen is flat. Bowel sounds are normal. There is no distension.      Palpations: Abdomen is soft.      Tenderness: There is no abdominal tenderness. There is no guarding.   Musculoskeletal:         General: Swelling present. No tenderness. Normal range of motion.      Cervical back: Normal range of motion and neck supple. No tenderness.      Right lower leg: No edema.      Left lower leg: No edema.   Lymphadenopathy:      Cervical: No cervical adenopathy.   Skin:     General: Skin is warm and dry.      Coloration: Skin is not jaundiced or pale.      Findings: Erythema present.   Neurological:      General: No focal deficit present.      Mental Status: He is alert and oriented to person, place, and time. Mental status is at baseline.      Cranial Nerves: No cranial nerve deficit.      Motor: No weakness.      Coordination: Coordination normal.      Gait: Gait normal.   Psychiatric:         Mood and Affect: Mood normal.         Behavior: Behavior normal.         Thought Content: Thought content normal.         Judgment: Judgment normal.             Significant Labs: All pertinent labs within the past 24 hours have been reviewed.    Significant Imaging: I have reviewed all pertinent imaging results/findings within the past 24 hours.    Assessment/Plan:      * Severe sepsis  This patient does have evidence of infective focus  My overall impression is sepsis.  Source: Skin and Soft Tissue (location leg)  Antibiotics given-   Antibiotics (72h ago, onward)      Start     Stop Route Frequency Ordered    06/15/24 0130  piperacillin-tazobactam (ZOSYN) 4.5 g in dextrose 5 % in water (D5W) 100 mL IVPB (MB+)  (Skin & Soft Tissue Infection: Non-Purulent, Severe)         06/21/24 0129 IV Every 8 hours (non-standard times)  "06/14/24 1847 06/14/24 1847  vancomycin - pharmacy to dose  (Skin & Soft Tissue Infection: Non-Purulent, Severe)        Placed in "And" Linked Group    -- IV pharmacy to manage frequency 06/14/24 1847          Latest lactate reviewed-  Recent Labs   Lab 06/14/24  2223   LACTATE 3.1*     Organ dysfunction indicated by Encephalopathy    Fluid challenge Actual Body weight- Patient will receive 30ml/kg actual body weight to calculate fluid bolus for treatment of septic shock.     Post- resuscitation assessment No - Post resuscitation assessment not needed       Will Not start Pressors- Levophed for MAP of 65    Cellulitis of left lower extremity  Continue the antibiotics.  Repeat labs tomorrow.        VTE Risk Mitigation (From admission, onward)           Ordered     warfarin (COUMADIN) tablet 2.5 mg  Daily         06/14/24 1847     IP VTE HIGH RISK PATIENT  Once         06/14/24 1847     Reason for No Pharmacological VTE Prophylaxis  Once        Question:  Reasons:  Answer:  Already adequately anticoagulated on oral Anticoagulants    06/14/24 1847                    Discharge Planning   NORM:      Code Status: Full Code   Is the patient medically ready for discharge?:     Reason for patient still in hospital (select all that apply): Patient unstable                     Collin Baum MD  Department of Hospital Medicine   [facility]    "

## 2024-06-15 NOTE — HOSPITAL COURSE
06/15/2024 patient in with severe cellulitis left lower extremity he states it continues to hurt severely redness has not changed much overnight.  We will continue his antibiotics follow-up blood cultures.  Ultrasound negative for DVT.  06/16/2024 patient in with severe cellulitis left lower extremity is doing little bit better.  Potassium is little bit low.  We will replace potassium p.o. continue his IV antibiotics follow-up his blood cultures which so far are no growth.  06/17/2024 68-year-old male came in with left lower extremity cellulitis which is severe.  Ultrasound negative for DVT.  Blood cultures are no growth so far be has developed kidney function impairment we suspect due to a tubular necrosis.  Continue his IV antibiotics his hydration repeat some labs tomorrow hopefully kidney function will continue to get better.  We will get a renal ultrasound.

## 2024-06-15 NOTE — HPI
68 year old man with history of atrial fibrillation, COPD, HTN, BPH, HLD presented for lower extremity swelling/erythema for one week. Patient is on long term anticoagulation. Patient denies any trauma to bug bites in left lower extremity. Patient did report associated nausea/vomiting.      ED course: Blood cultures x2 obtained. CTA chest showed no PE. No DVT on US. Admitted for cellulitis           Past Medical History:   Diagnosis Date    A-fib      Allergy      Asthma      Kidney stone      Osteoarthritis      Osteoporosis

## 2024-06-15 NOTE — PLAN OF CARE
Problem: Adult Inpatient Plan of Care  Goal: Plan of Care Review  Outcome: Progressing  Goal: Patient-Specific Goal (Individualized)  Outcome: Progressing  Goal: Absence of Hospital-Acquired Illness or Injury  Outcome: Progressing  Goal: Optimal Comfort and Wellbeing  Outcome: Progressing  Goal: Readiness for Transition of Care  Outcome: Progressing     Problem: Bariatric Environmental Safety  Goal: Safety Maintained with Care  Outcome: Progressing     Problem: Sepsis/Septic Shock  Goal: Optimal Coping  Outcome: Progressing  Goal: Absence of Bleeding  Outcome: Progressing  Goal: Blood Glucose Level Within Targeted Range  Outcome: Progressing  Goal: Absence of Infection Signs and Symptoms  Outcome: Progressing  Goal: Optimal Nutrition Intake  Outcome: Progressing     Problem: Fall Injury Risk  Goal: Absence of Fall and Fall-Related Injury  Outcome: Progressing     Problem: Wound  Goal: Optimal Coping  Outcome: Progressing  Goal: Optimal Functional Ability  Outcome: Progressing  Goal: Absence of Infection Signs and Symptoms  Outcome: Progressing  Goal: Improved Oral Intake  Outcome: Progressing  Goal: Optimal Pain Control and Function  Outcome: Progressing  Goal: Skin Health and Integrity  Outcome: Progressing     Problem: Wound  Goal: Optimal Wound Healing  Outcome: Not Progressing     Problem: Skin or Soft Tissue Infection  Goal: Absence of Infection Signs and Symptoms  Outcome: Not Progressing

## 2024-06-16 PROBLEM — E87.6 HYPOKALEMIA: Status: ACTIVE | Noted: 2024-06-16

## 2024-06-16 LAB
ALBUMIN SERPL-MCNC: 3.3 G/DL (ref 3.4–5)
ALBUMIN/GLOB SERPL: 1 RATIO
ALP SERPL-CCNC: 67 UNIT/L (ref 50–144)
ALT SERPL-CCNC: 24 UNIT/L (ref 1–45)
ANION GAP SERPL CALC-SCNC: 7 MEQ/L (ref 2–13)
AST SERPL-CCNC: 30 UNIT/L (ref 17–59)
BASOPHILS # BLD AUTO: 0.04 X10(3)/MCL (ref 0.01–0.08)
BASOPHILS NFR BLD AUTO: 0.2 % (ref 0.1–1.2)
BILIRUB SERPL-MCNC: 1.1 MG/DL (ref 0–1)
BUN SERPL-MCNC: 34 MG/DL (ref 7–20)
CALCIUM SERPL-MCNC: 8.2 MG/DL (ref 8.4–10.2)
CHLORIDE SERPL-SCNC: 102 MMOL/L (ref 98–110)
CO2 SERPL-SCNC: 24 MMOL/L (ref 21–32)
CREAT SERPL-MCNC: 2.6 MG/DL (ref 0.66–1.25)
CREAT/UREA NIT SERPL: 13 (ref 12–20)
EOSINOPHIL # BLD AUTO: 0 X10(3)/MCL (ref 0.04–0.54)
EOSINOPHIL NFR BLD AUTO: 0 % (ref 0.7–7)
ERYTHROCYTE [DISTWIDTH] IN BLOOD BY AUTOMATED COUNT: 13.9 %
GFR SERPLBLD CREATININE-BSD FMLA CKD-EPI: 26 ML/MIN/1.73/M2
GLOBULIN SER-MCNC: 3.4 GM/DL (ref 2–3.9)
GLUCOSE SERPL-MCNC: 137 MG/DL (ref 70–115)
HCT VFR BLD AUTO: 36.6 % (ref 36–52)
HGB BLD-MCNC: 12 G/DL (ref 13–18)
IMM GRANULOCYTES # BLD AUTO: 0.09 X10(3)/MCL (ref 0–0.03)
IMM GRANULOCYTES NFR BLD AUTO: 0.6 % (ref 0–0.5)
INR PPP: 1.4
LYMPHOCYTES # BLD AUTO: 1.35 X10(3)/MCL (ref 1.32–3.57)
LYMPHOCYTES NFR BLD AUTO: 8.3 % (ref 20–55)
MAGNESIUM SERPL-MCNC: 2.4 MG/DL (ref 1.8–2.4)
MCH RBC QN AUTO: 31.5 PG (ref 27–34)
MCHC RBC AUTO-ENTMCNC: 32.8 G/DL (ref 31–37)
MCV RBC AUTO: 96.1 FL (ref 79–99)
MONOCYTES # BLD AUTO: 1.3 X10(3)/MCL (ref 0.3–0.82)
MONOCYTES NFR BLD AUTO: 8 % (ref 4.7–12.5)
NEUTROPHILS # BLD AUTO: 13.52 X10(3)/MCL (ref 1.78–5.38)
NEUTROPHILS NFR BLD AUTO: 82.9 % (ref 37–73)
NRBC BLD AUTO-RTO: 0 %
PLATELET # BLD AUTO: 153 X10(3)/MCL (ref 140–371)
PMV BLD AUTO: 9.8 FL (ref 9.4–12.4)
POTASSIUM SERPL-SCNC: 3.4 MMOL/L (ref 3.5–5.1)
PROT SERPL-MCNC: 6.7 GM/DL (ref 6.3–8.2)
PROTHROMBIN TIME: 14.1 SECONDS (ref 9.3–11.9)
RBC # BLD AUTO: 3.81 X10(6)/MCL (ref 4–6)
SODIUM SERPL-SCNC: 133 MMOL/L (ref 136–145)
VANCOMYCIN TROUGH SERPL-MCNC: 22.2 UG/ML (ref 10–20)
WBC # BLD AUTO: 16.3 X10(3)/MCL (ref 4–11.5)

## 2024-06-16 PROCEDURE — 63600175 PHARM REV CODE 636 W HCPCS

## 2024-06-16 PROCEDURE — 25000003 PHARM REV CODE 250: Performed by: INTERNAL MEDICINE

## 2024-06-16 PROCEDURE — 80053 COMPREHEN METABOLIC PANEL: CPT | Performed by: INTERNAL MEDICINE

## 2024-06-16 PROCEDURE — 63600175 PHARM REV CODE 636 W HCPCS: Mod: JZ,JG | Performed by: INTERNAL MEDICINE

## 2024-06-16 PROCEDURE — 85610 PROTHROMBIN TIME: CPT | Performed by: FAMILY MEDICINE

## 2024-06-16 PROCEDURE — 25000003 PHARM REV CODE 250

## 2024-06-16 PROCEDURE — 36415 COLL VENOUS BLD VENIPUNCTURE: CPT | Performed by: FAMILY MEDICINE

## 2024-06-16 PROCEDURE — 94640 AIRWAY INHALATION TREATMENT: CPT

## 2024-06-16 PROCEDURE — 80202 ASSAY OF VANCOMYCIN: CPT | Performed by: FAMILY MEDICINE

## 2024-06-16 PROCEDURE — 94761 N-INVAS EAR/PLS OXIMETRY MLT: CPT

## 2024-06-16 PROCEDURE — 25000242 PHARM REV CODE 250 ALT 637 W/ HCPCS: Performed by: FAMILY MEDICINE

## 2024-06-16 PROCEDURE — 36415 COLL VENOUS BLD VENIPUNCTURE: CPT | Performed by: INTERNAL MEDICINE

## 2024-06-16 PROCEDURE — 11000001 HC ACUTE MED/SURG PRIVATE ROOM

## 2024-06-16 PROCEDURE — 83735 ASSAY OF MAGNESIUM: CPT | Performed by: INTERNAL MEDICINE

## 2024-06-16 PROCEDURE — 21400001 HC TELEMETRY ROOM

## 2024-06-16 PROCEDURE — 99900035 HC TECH TIME PER 15 MIN (STAT)

## 2024-06-16 PROCEDURE — 85025 COMPLETE CBC W/AUTO DIFF WBC: CPT | Performed by: INTERNAL MEDICINE

## 2024-06-16 RX ORDER — POTASSIUM CHLORIDE 20 MEQ/1
40 TABLET, EXTENDED RELEASE ORAL ONCE
Status: COMPLETED | OUTPATIENT
Start: 2024-06-16 | End: 2024-06-16

## 2024-06-16 RX ADMIN — LISINOPRIL 10 MG: 10 TABLET ORAL at 08:06

## 2024-06-16 RX ADMIN — PIPERACILLIN AND TAZOBACTAM 4.5 G: 4; .5 INJECTION, POWDER, FOR SOLUTION INTRAVENOUS; PARENTERAL at 01:06

## 2024-06-16 RX ADMIN — TAMSULOSIN HYDROCHLORIDE 0.4 MG: 0.4 CAPSULE ORAL at 09:06

## 2024-06-16 RX ADMIN — HYDROCODONE BITARTRATE AND ACETAMINOPHEN 1 TABLET: 5; 325 TABLET ORAL at 04:06

## 2024-06-16 RX ADMIN — CLINDAMYCIN PHOSPHATE 600 MG: 600 INJECTION, SOLUTION INTRAVENOUS at 01:06

## 2024-06-16 RX ADMIN — ATORVASTATIN CALCIUM 40 MG: 40 TABLET, FILM COATED ORAL at 09:06

## 2024-06-16 RX ADMIN — PIPERACILLIN AND TAZOBACTAM 4.5 G: 4; .5 INJECTION, POWDER, FOR SOLUTION INTRAVENOUS; PARENTERAL at 08:06

## 2024-06-16 RX ADMIN — CETIRIZINE HYDROCHLORIDE 10 MG: 10 TABLET, FILM COATED ORAL at 08:06

## 2024-06-16 RX ADMIN — METOPROLOL SUCCINATE 50 MG: 50 TABLET, EXTENDED RELEASE ORAL at 08:06

## 2024-06-16 RX ADMIN — POTASSIUM CHLORIDE 40 MEQ: 1500 TABLET, EXTENDED RELEASE ORAL at 03:06

## 2024-06-16 RX ADMIN — TORSEMIDE 20 MG: 20 TABLET ORAL at 08:06

## 2024-06-16 RX ADMIN — PIPERACILLIN AND TAZOBACTAM 4.5 G: 4; .5 INJECTION, POWDER, FOR SOLUTION INTRAVENOUS; PARENTERAL at 04:06

## 2024-06-16 RX ADMIN — CLINDAMYCIN PHOSPHATE 600 MG: 600 INJECTION, SOLUTION INTRAVENOUS at 05:06

## 2024-06-16 RX ADMIN — WARFARIN SODIUM 2.5 MG: 2.5 TABLET ORAL at 08:06

## 2024-06-16 RX ADMIN — FLUTICASONE FUROATE AND VILANTEROL TRIFENATATE 1 PUFF: 200; 25 POWDER RESPIRATORY (INHALATION) at 08:06

## 2024-06-16 RX ADMIN — CLINDAMYCIN PHOSPHATE 600 MG: 600 INJECTION, SOLUTION INTRAVENOUS at 10:06

## 2024-06-16 RX ADMIN — ONDANSETRON 4 MG: 2 INJECTION INTRAMUSCULAR; INTRAVENOUS at 08:06

## 2024-06-16 NOTE — PLAN OF CARE
Problem: Adult Inpatient Plan of Care  Goal: Plan of Care Review  Outcome: Progressing  Goal: Patient-Specific Goal (Individualized)  Outcome: Progressing  Goal: Absence of Hospital-Acquired Illness or Injury  Outcome: Progressing  Goal: Optimal Comfort and Wellbeing  Outcome: Progressing  Goal: Readiness for Transition of Care  Outcome: Progressing     Problem: Bariatric Environmental Safety  Goal: Safety Maintained with Care  Outcome: Progressing     Problem: Sepsis/Septic Shock  Goal: Optimal Coping  Outcome: Progressing  Goal: Absence of Bleeding  Outcome: Progressing  Goal: Blood Glucose Level Within Targeted Range  Outcome: Progressing  Goal: Absence of Infection Signs and Symptoms  Outcome: Progressing  Goal: Optimal Nutrition Intake  Outcome: Progressing     Problem: Fall Injury Risk  Goal: Absence of Fall and Fall-Related Injury  Outcome: Progressing     Problem: Wound  Goal: Optimal Coping  Outcome: Progressing  Goal: Improved Oral Intake  Outcome: Progressing  Goal: Skin Health and Integrity  Outcome: Progressing     Problem: Wound  Goal: Optimal Functional Ability  Outcome: Not Progressing  Intervention: Optimize Functional Ability  Flowsheets (Taken 6/16/2024 0605)  Activity Assistance Provided: assistance, 1 person  Goal: Absence of Infection Signs and Symptoms  Outcome: Not Progressing  Intervention: Prevent or Manage Infection  Flowsheets (Taken 6/16/2024 0605)  Fever Reduction/Comfort Measures:   lightweight bedding   fluid intake increased   aerosol temperature decreased  Goal: Optimal Pain Control and Function  Outcome: Not Progressing  Intervention: Prevent or Manage Pain  Flowsheets (Taken 6/16/2024 0605)  Pain Management Interventions:   medication offered   pain management plan reviewed with patient/caregiver   position adjusted  Goal: Optimal Wound Healing  Outcome: Not Progressing     Problem: Skin or Soft Tissue Infection  Goal: Absence of Infection Signs and Symptoms  Outcome: Not  Progressing  Intervention: Minimize and Manage Infection Progression  Flowsheets (Taken 6/16/2024 0605)  Fever Reduction/Comfort Measures:   lightweight bedding   fluid intake increased   aerosol temperature decreased  Intervention: Provide Meticulous Infection Site Care  Flowsheets (Taken 6/16/2024 0605)  Topical Inflammation Care:   border expansion noted   border marked

## 2024-06-16 NOTE — PROGRESS NOTES
[facility]/Surg  [facility] Medicine  Progress Note    Patient Name: Reid Mcqueen  MRN: [id number]  Patient Class: IP- Inpatient   Admission Date: 6/14/2024  Length of Stay: 2 days  Attending Physician: Adrian Milian MD  Primary Care Provider: Adán Unger III, MD        Subjective:     Principal Problem:Severe sepsis        HPI:  68 year old man with history of atrial fibrillation, COPD, HTN, BPH, HLD presented for lower extremity swelling/erythema for one week. Patient is on long term anticoagulation. Patient denies any trauma to bug bites in left lower extremity. Patient did report associated nausea/vomiting.      ED course: Blood cultures x2 obtained. CTA chest showed no PE. No DVT on US. Admitted for cellulitis           Past Medical History:   Diagnosis Date    A-fib      Allergy      Asthma      Kidney stone      Osteoarthritis      Osteoporosis        Overview/Hospital Course:  06/15/2024 patient in with severe cellulitis left lower extremity he states it continues to hurt severely redness has not changed much overnight.  We will continue his antibiotics follow-up blood cultures.  Ultrasound negative for DVT.  06/16/2024 patient in with severe cellulitis left lower extremity is doing little bit better.  Potassium is little bit low.  We will replace potassium p.o. continue his IV antibiotics follow-up his blood cultures which so far are no growth.    Interval History:     Review of Systems   Constitutional:  Positive for fever. Negative for activity change, appetite change and fatigue.   HENT:  Negative for congestion, ear pain, nosebleeds, sinus pressure, sore throat, tinnitus and trouble swallowing.    Eyes:  Negative for pain and itching.   Respiratory:  Negative for apnea, cough, choking, chest tightness, shortness of breath and wheezing.    Cardiovascular:  Negative for chest pain and leg swelling.   Gastrointestinal:  Negative for abdominal distention, abdominal pain,  constipation, diarrhea, nausea and vomiting.   Endocrine: Negative for cold intolerance and heat intolerance.   Genitourinary:  Negative for dysuria, enuresis, frequency, penile swelling, scrotal swelling, testicular pain and urgency.   Musculoskeletal:  Negative for arthralgias, back pain, gait problem, joint swelling, myalgias and neck pain.   Skin:  Positive for color change and rash. Negative for pallor.   Allergic/Immunologic: Negative for environmental allergies and food allergies.   Neurological:  Negative for dizziness, tremors, seizures, syncope, numbness and headaches.   Psychiatric/Behavioral:  Negative for agitation, behavioral problems, dysphoric mood, hallucinations, self-injury and suicidal ideas. The patient is not nervous/anxious and is not hyperactive.      Objective:     Vital Signs (Most Recent):  Temp: 97.8 °F (36.6 °C) (06/16/24 1119)  Pulse: 64 (06/16/24 1313)  Resp: 16 (06/16/24 1313)  BP: 98/63 (06/16/24 1119)  SpO2: (!) 94 % (06/16/24 1313) Vital Signs (24h Range):  Temp:  [97.8 °F (36.6 °C)-100.6 °F (38.1 °C)] 97.8 °F (36.6 °C)  Pulse:  [64-90] 64  Resp:  [16-22] 16  SpO2:  [91 %-95 %] 94 %  BP: ()/(52-65) 98/63     Weight: 115.9 kg (255 lb 9.6 oz)  Body mass index is 40.03 kg/m².    Intake/Output Summary (Last 24 hours) at 6/16/2024 1446  Last data filed at 6/16/2024 1041  Gross per 24 hour   Intake 1080 ml   Output --   Net 1080 ml         Physical Exam  Constitutional:       General: He is not in acute distress.     Appearance: Normal appearance. He is ill-appearing. He is not toxic-appearing or diaphoretic.   HENT:      Head: Normocephalic and atraumatic.      Right Ear: External ear normal.      Left Ear: External ear normal.      Nose: Nose normal. No congestion or rhinorrhea.      Mouth/Throat:      Mouth: Mucous membranes are moist.      Pharynx: Oropharynx is clear.   Eyes:      Extraocular Movements: Extraocular movements intact.      Conjunctiva/sclera: Conjunctivae  normal.      Pupils: Pupils are equal, round, and reactive to light.   Neck:      Vascular: No carotid bruit.   Cardiovascular:      Rate and Rhythm: Normal rate and regular rhythm.      Pulses: Normal pulses.      Heart sounds: Normal heart sounds. No murmur heard.  Pulmonary:      Effort: Pulmonary effort is normal. No respiratory distress.      Breath sounds: Normal breath sounds. No wheezing, rhonchi or rales.   Abdominal:      General: Abdomen is flat. Bowel sounds are normal. There is no distension.      Palpations: Abdomen is soft.      Tenderness: There is no abdominal tenderness. There is no guarding.   Musculoskeletal:         General: Swelling present. No tenderness. Normal range of motion.      Cervical back: Normal range of motion and neck supple. No tenderness.      Right lower leg: No edema.      Left lower leg: No edema.   Lymphadenopathy:      Cervical: No cervical adenopathy.   Skin:     General: Skin is warm and dry.      Coloration: Skin is not jaundiced or pale.      Findings: Erythema present.   Neurological:      General: No focal deficit present.      Mental Status: He is alert and oriented to person, place, and time. Mental status is at baseline.      Cranial Nerves: No cranial nerve deficit.      Motor: No weakness.      Coordination: Coordination normal.      Gait: Gait normal.   Psychiatric:         Mood and Affect: Mood normal.         Behavior: Behavior normal.         Thought Content: Thought content normal.         Judgment: Judgment normal.             Significant Labs: All pertinent labs within the past 24 hours have been reviewed.    Significant Imaging: I have reviewed all pertinent imaging results/findings within the past 24 hours.    Assessment/Plan:      * Severe sepsis  This patient does have evidence of infective focus  My overall impression is sepsis.  Source: Skin and Soft Tissue (location leg)  Antibiotics given-   Antibiotics (72h ago, onward)      Start     Stop Route  "Frequency Ordered    06/15/24 1000  clindamycin in D5W 600 mg/50 mL IVPB 600 mg         -- IV Every 8 hours (non-standard times) 06/15/24 0852    06/15/24 0130  piperacillin-tazobactam (ZOSYN) 4.5 g in dextrose 5 % in water (D5W) 100 mL IVPB (MB+)  (Skin & Soft Tissue Infection: Non-Purulent, Severe)         06/21/24 0129 IV Every 8 hours (non-standard times) 06/14/24 1847 06/14/24 1847  vancomycin - pharmacy to dose  (Skin & Soft Tissue Infection: Non-Purulent, Severe)        Placed in "And" Linked Group    -- IV pharmacy to manage frequency 06/14/24 1847          Latest lactate reviewed-  Recent Labs   Lab 06/14/24 2223   LACTATE 3.1*       Organ dysfunction indicated by Encephalopathy    Fluid challenge Actual Body weight- Patient will receive 30ml/kg actual body weight to calculate fluid bolus for treatment of septic shock.     Post- resuscitation assessment No - Post resuscitation assessment not needed       Will Not start Pressors- Levophed for MAP of 65    Hypokalemia  Patient has hypokalemia which is Acute and currently uncontrolled. Most recent potassium levels reviewed-   Lab Results   Component Value Date    K 3.4 (L) 06/16/2024   . Will continue potassium replacement per protocol and recheck repeat levels after replacement completed.     Cellulitis of left lower extremity  Continue the antibiotics.  Repeat labs tomorrow.        VTE Risk Mitigation (From admission, onward)           Ordered     warfarin (COUMADIN) tablet 2.5 mg  Daily         06/15/24 0932     IP VTE HIGH RISK PATIENT  Once         06/14/24 1847     Reason for No Pharmacological VTE Prophylaxis  Once        Question:  Reasons:  Answer:  Already adequately anticoagulated on oral Anticoagulants    06/14/24 1847                    Discharge Planning   NORM:      Code Status: Full Code   Is the patient medically ready for discharge?:     Reason for patient still in hospital (select all that apply): Patient unstable       "               Collin Baum MD  Department of Hospital Medicine   [facility]/Surg

## 2024-06-16 NOTE — SUBJECTIVE & OBJECTIVE
Interval History:     Review of Systems   Constitutional:  Positive for fever. Negative for activity change, appetite change and fatigue.   HENT:  Negative for congestion, ear pain, nosebleeds, sinus pressure, sore throat, tinnitus and trouble swallowing.    Eyes:  Negative for pain and itching.   Respiratory:  Negative for apnea, cough, choking, chest tightness, shortness of breath and wheezing.    Cardiovascular:  Negative for chest pain and leg swelling.   Gastrointestinal:  Negative for abdominal distention, abdominal pain, constipation, diarrhea, nausea and vomiting.   Endocrine: Negative for cold intolerance and heat intolerance.   Genitourinary:  Negative for dysuria, enuresis, frequency, penile swelling, scrotal swelling, testicular pain and urgency.   Musculoskeletal:  Negative for arthralgias, back pain, gait problem, joint swelling, myalgias and neck pain.   Skin:  Positive for color change and rash. Negative for pallor.   Allergic/Immunologic: Negative for environmental allergies and food allergies.   Neurological:  Negative for dizziness, tremors, seizures, syncope, numbness and headaches.   Psychiatric/Behavioral:  Negative for agitation, behavioral problems, dysphoric mood, hallucinations, self-injury and suicidal ideas. The patient is not nervous/anxious and is not hyperactive.      Objective:     Vital Signs (Most Recent):  Temp: 97.8 °F (36.6 °C) (06/16/24 1119)  Pulse: 64 (06/16/24 1313)  Resp: 16 (06/16/24 1313)  BP: 98/63 (06/16/24 1119)  SpO2: (!) 94 % (06/16/24 1313) Vital Signs (24h Range):  Temp:  [97.8 °F (36.6 °C)-100.6 °F (38.1 °C)] 97.8 °F (36.6 °C)  Pulse:  [64-90] 64  Resp:  [16-22] 16  SpO2:  [91 %-95 %] 94 %  BP: ()/(52-65) 98/63     Weight: 115.9 kg (255 lb 9.6 oz)  Body mass index is 40.03 kg/m².    Intake/Output Summary (Last 24 hours) at 6/16/2024 1446  Last data filed at 6/16/2024 1041  Gross per 24 hour   Intake 1080 ml   Output --   Net 1080 ml         Physical  Exam  Constitutional:       General: He is not in acute distress.     Appearance: Normal appearance. He is ill-appearing. He is not toxic-appearing or diaphoretic.   HENT:      Head: Normocephalic and atraumatic.      Right Ear: External ear normal.      Left Ear: External ear normal.      Nose: Nose normal. No congestion or rhinorrhea.      Mouth/Throat:      Mouth: Mucous membranes are moist.      Pharynx: Oropharynx is clear.   Eyes:      Extraocular Movements: Extraocular movements intact.      Conjunctiva/sclera: Conjunctivae normal.      Pupils: Pupils are equal, round, and reactive to light.   Neck:      Vascular: No carotid bruit.   Cardiovascular:      Rate and Rhythm: Normal rate and regular rhythm.      Pulses: Normal pulses.      Heart sounds: Normal heart sounds. No murmur heard.  Pulmonary:      Effort: Pulmonary effort is normal. No respiratory distress.      Breath sounds: Normal breath sounds. No wheezing, rhonchi or rales.   Abdominal:      General: Abdomen is flat. Bowel sounds are normal. There is no distension.      Palpations: Abdomen is soft.      Tenderness: There is no abdominal tenderness. There is no guarding.   Musculoskeletal:         General: Swelling present. No tenderness. Normal range of motion.      Cervical back: Normal range of motion and neck supple. No tenderness.      Right lower leg: No edema.      Left lower leg: No edema.   Lymphadenopathy:      Cervical: No cervical adenopathy.   Skin:     General: Skin is warm and dry.      Coloration: Skin is not jaundiced or pale.      Findings: Erythema present.   Neurological:      General: No focal deficit present.      Mental Status: He is alert and oriented to person, place, and time. Mental status is at baseline.      Cranial Nerves: No cranial nerve deficit.      Motor: No weakness.      Coordination: Coordination normal.      Gait: Gait normal.   Psychiatric:         Mood and Affect: Mood normal.         Behavior: Behavior  normal.         Thought Content: Thought content normal.         Judgment: Judgment normal.             Significant Labs: All pertinent labs within the past 24 hours have been reviewed.    Significant Imaging: I have reviewed all pertinent imaging results/findings within the past 24 hours.

## 2024-06-17 PROBLEM — N17.9 ACUTE RENAL FAILURE: Status: ACTIVE | Noted: 2024-06-17

## 2024-06-17 PROBLEM — R06.00 DYSPNEA: Status: RESOLVED | Noted: 2024-06-14 | Resolved: 2024-06-17

## 2024-06-17 LAB
ALBUMIN SERPL-MCNC: 2.8 G/DL (ref 3.4–5)
ALBUMIN/GLOB SERPL: 1 RATIO
ALP SERPL-CCNC: 60 UNIT/L (ref 50–144)
ALT SERPL-CCNC: 31 UNIT/L (ref 1–45)
ANION GAP SERPL CALC-SCNC: 10 MEQ/L (ref 2–13)
AST SERPL-CCNC: 41 UNIT/L (ref 17–59)
BASOPHILS # BLD AUTO: 0.02 X10(3)/MCL (ref 0.01–0.08)
BASOPHILS NFR BLD AUTO: 0.2 % (ref 0.1–1.2)
BILIRUB SERPL-MCNC: 0.8 MG/DL (ref 0–1)
BUN SERPL-MCNC: 51 MG/DL (ref 7–20)
CALCIUM SERPL-MCNC: 8.2 MG/DL (ref 8.4–10.2)
CHLORIDE SERPL-SCNC: 103 MMOL/L (ref 98–110)
CO2 SERPL-SCNC: 21 MMOL/L (ref 21–32)
CREAT SERPL-MCNC: 4.88 MG/DL (ref 0.66–1.25)
CREAT/UREA NIT SERPL: 10 (ref 12–20)
EOSINOPHIL # BLD AUTO: 0 X10(3)/MCL (ref 0.04–0.54)
EOSINOPHIL NFR BLD AUTO: 0 % (ref 0.7–7)
ERYTHROCYTE [DISTWIDTH] IN BLOOD BY AUTOMATED COUNT: 14.1 %
GFR SERPLBLD CREATININE-BSD FMLA CKD-EPI: 12 ML/MIN/1.73/M2
GLOBULIN SER-MCNC: 2.9 GM/DL (ref 2–3.9)
GLUCOSE SERPL-MCNC: 110 MG/DL (ref 70–115)
HCT VFR BLD AUTO: 36.1 % (ref 36–52)
HGB BLD-MCNC: 11.9 G/DL (ref 13–18)
IMM GRANULOCYTES # BLD AUTO: 0.06 X10(3)/MCL (ref 0–0.03)
IMM GRANULOCYTES NFR BLD AUTO: 0.6 % (ref 0–0.5)
INR PPP: 1.6
LYMPHOCYTES # BLD AUTO: 1.04 X10(3)/MCL (ref 1.32–3.57)
LYMPHOCYTES NFR BLD AUTO: 10.1 % (ref 20–55)
MAGNESIUM SERPL-MCNC: 2.5 MG/DL (ref 1.8–2.4)
MCH RBC QN AUTO: 31.2 PG (ref 27–34)
MCHC RBC AUTO-ENTMCNC: 33 G/DL (ref 31–37)
MCV RBC AUTO: 94.8 FL (ref 79–99)
MONOCYTES # BLD AUTO: 1.03 X10(3)/MCL (ref 0.3–0.82)
MONOCYTES NFR BLD AUTO: 10 % (ref 4.7–12.5)
NEUTROPHILS # BLD AUTO: 8.19 X10(3)/MCL (ref 1.78–5.38)
NEUTROPHILS NFR BLD AUTO: 79.1 % (ref 37–73)
OHS QRS DURATION: 146 MS
OHS QTC CALCULATION: 505 MS
PLATELET # BLD AUTO: 186 X10(3)/MCL (ref 140–371)
PMV BLD AUTO: 9.9 FL (ref 9.4–12.4)
POTASSIUM SERPL-SCNC: 3.6 MMOL/L (ref 3.5–5.1)
PROT SERPL-MCNC: 5.7 GM/DL (ref 6.3–8.2)
PROTHROMBIN TIME: 15.7 SECONDS (ref 9.3–11.9)
RBC # BLD AUTO: 3.81 X10(6)/MCL (ref 4–6)
SODIUM SERPL-SCNC: 134 MMOL/L (ref 136–145)
VANCOMYCIN TROUGH SERPL-MCNC: 18 UG/ML (ref 10–20)
WBC # BLD AUTO: 10.34 X10(3)/MCL (ref 4–11.5)

## 2024-06-17 PROCEDURE — 36415 COLL VENOUS BLD VENIPUNCTURE: CPT | Performed by: INTERNAL MEDICINE

## 2024-06-17 PROCEDURE — 85610 PROTHROMBIN TIME: CPT | Performed by: FAMILY MEDICINE

## 2024-06-17 PROCEDURE — 83735 ASSAY OF MAGNESIUM: CPT | Performed by: INTERNAL MEDICINE

## 2024-06-17 PROCEDURE — 25000003 PHARM REV CODE 250: Performed by: INTERNAL MEDICINE

## 2024-06-17 PROCEDURE — 25000003 PHARM REV CODE 250: Performed by: FAMILY MEDICINE

## 2024-06-17 PROCEDURE — 63600175 PHARM REV CODE 636 W HCPCS

## 2024-06-17 PROCEDURE — 85025 COMPLETE CBC W/AUTO DIFF WBC: CPT | Performed by: INTERNAL MEDICINE

## 2024-06-17 PROCEDURE — 21400001 HC TELEMETRY ROOM

## 2024-06-17 PROCEDURE — 63600175 PHARM REV CODE 636 W HCPCS: Mod: JZ,JG | Performed by: INTERNAL MEDICINE

## 2024-06-17 PROCEDURE — 94640 AIRWAY INHALATION TREATMENT: CPT

## 2024-06-17 PROCEDURE — 25000003 PHARM REV CODE 250

## 2024-06-17 PROCEDURE — 99900035 HC TECH TIME PER 15 MIN (STAT)

## 2024-06-17 PROCEDURE — 80053 COMPREHEN METABOLIC PANEL: CPT | Performed by: INTERNAL MEDICINE

## 2024-06-17 PROCEDURE — 80202 ASSAY OF VANCOMYCIN: CPT | Performed by: FAMILY MEDICINE

## 2024-06-17 PROCEDURE — 63600175 PHARM REV CODE 636 W HCPCS: Performed by: FAMILY MEDICINE

## 2024-06-17 PROCEDURE — 94761 N-INVAS EAR/PLS OXIMETRY MLT: CPT

## 2024-06-17 PROCEDURE — 36415 COLL VENOUS BLD VENIPUNCTURE: CPT | Performed by: FAMILY MEDICINE

## 2024-06-17 RX ORDER — SODIUM CHLORIDE 450 MG/100ML
INJECTION, SOLUTION INTRAVENOUS CONTINUOUS
Status: DISCONTINUED | OUTPATIENT
Start: 2024-06-17 | End: 2024-06-18 | Stop reason: HOSPADM

## 2024-06-17 RX ADMIN — LISINOPRIL 10 MG: 10 TABLET ORAL at 09:06

## 2024-06-17 RX ADMIN — PIPERACILLIN AND TAZOBACTAM 4.5 G: 4; .5 INJECTION, POWDER, FOR SOLUTION INTRAVENOUS; PARENTERAL at 01:06

## 2024-06-17 RX ADMIN — PIPERACILLIN AND TAZOBACTAM 4.5 G: 4; .5 INJECTION, POWDER, FOR SOLUTION INTRAVENOUS; PARENTERAL at 08:06

## 2024-06-17 RX ADMIN — CLINDAMYCIN PHOSPHATE 600 MG: 600 INJECTION, SOLUTION INTRAVENOUS at 05:06

## 2024-06-17 RX ADMIN — CLINDAMYCIN PHOSPHATE 600 MG: 600 INJECTION, SOLUTION INTRAVENOUS at 01:06

## 2024-06-17 RX ADMIN — PIPERACILLIN AND TAZOBACTAM 4.5 G: 4; .5 INJECTION, POWDER, FOR SOLUTION INTRAVENOUS; PARENTERAL at 09:06

## 2024-06-17 RX ADMIN — ATORVASTATIN CALCIUM 40 MG: 40 TABLET, FILM COATED ORAL at 08:06

## 2024-06-17 RX ADMIN — FLUTICASONE FUROATE AND VILANTEROL TRIFENATATE 1 PUFF: 200; 25 POWDER RESPIRATORY (INHALATION) at 07:06

## 2024-06-17 RX ADMIN — ONDANSETRON 4 MG: 2 INJECTION INTRAMUSCULAR; INTRAVENOUS at 05:06

## 2024-06-17 RX ADMIN — SODIUM CHLORIDE: 4.5 INJECTION, SOLUTION INTRAVENOUS at 01:06

## 2024-06-17 RX ADMIN — TAMSULOSIN HYDROCHLORIDE 0.4 MG: 0.4 CAPSULE ORAL at 08:06

## 2024-06-17 RX ADMIN — WARFARIN SODIUM 2.5 MG: 2.5 TABLET ORAL at 09:06

## 2024-06-17 RX ADMIN — METOPROLOL SUCCINATE 50 MG: 50 TABLET, EXTENDED RELEASE ORAL at 09:06

## 2024-06-17 RX ADMIN — CETIRIZINE HYDROCHLORIDE 10 MG: 10 TABLET, FILM COATED ORAL at 09:06

## 2024-06-17 RX ADMIN — TORSEMIDE 20 MG: 20 TABLET ORAL at 09:06

## 2024-06-17 RX ADMIN — CLINDAMYCIN PHOSPHATE 600 MG: 600 INJECTION, SOLUTION INTRAVENOUS at 09:06

## 2024-06-17 NOTE — PROGRESS NOTES
Unable to reach Dr. Baum via secure chat.  Contacted Dr. Baum's office concerning IV antibiotics and spoke to Elaina.  Patient on clindamycin, piperacillin/tazobactam, and vancomycin with worsening renal function.  Piperacillin/tazobactam and vancomycin combination not recommended due to nephrotoxicity.  Dual MRSA coverage with vancomycin and clindamycin.  Communicated concerns to Elaina at office.  She states that Dr. Baum is aware of above and will make a decision later if needed.  He will contact [facility] nursing if he decides to adjust antibiotics.      Drug levels (last 3 results):  Recent Labs   Lab Result Units 06/15/24  1125 06/16/24  1156 06/17/24  1125   Vancomycin Random ug/ml 9.6  --   --    Vancomycin Trough ug/ml  --  22.2* 18.0     CBC (last 72 hours):  Recent Labs   Lab Result Units 06/14/24  1555 06/15/24  0715 06/16/24  0430 06/17/24  0629   WBC x10(3)/mcL 26.25* 21.52* 16.30* 10.34   Hgb g/dL 15.3 12.7* 12.0* 11.9*   Hct % 45.1 38.3 36.6 36.1   Platelet x10(3)/mcL 197 158 153 186   Mono % %  --  4.2* 8.0 10.0   Eos % %  --  0.0* 0.0* 0.0*   Basophil % %  --  0.1 0.2 0.2       Metabolic Panel (last 72 hours):  Recent Labs   Lab Result Units 06/14/24  1555 06/14/24  1733 06/15/24  0430 06/16/24  0432 06/17/24  0629   Sodium mmol/L 137  --  133* 133* 134*   Potassium mmol/L 3.6  --  3.9 3.4* 3.6   Chloride mmol/L 101  --  103 102 103   CO2 mmol/L 24  --  24 24 21   Glucose mg/dL 166*  --  154* 137* 110   Glucose, UA   --  Negative  --   --   --    Blood Urea Nitrogen mg/dL 34*  --  28* 34* 51*   Creatinine mg/dL 1.69*  --  1.43* 2.60* 4.88*   Albumin g/dL 4.5  --  3.3* 3.3* 2.8*   Bilirubin Total mg/dL 1.2*  --  1.5* 1.1* 0.8   ALP unit/L 69  --  61 67 60   AST unit/L 35  --  32 30 41   ALT unit/L 35  --  24 24 31   Magnesium Level mg/dL 1.80  --   --  2.40 2.50*

## 2024-06-17 NOTE — PROGRESS NOTES
Pharmacist Renal Dose Adjustment Note    Reid Mcqueen is a 68 y.o. male being treated with the medication pip/tazo    Patient Data:    Vital Signs (Most Recent):  Temp: 97.6 °F (36.4 °C) (06/17/24 0701)  Pulse: 72 (06/17/24 0717)  Resp: 20 (06/17/24 0717)  BP: 108/66 (06/17/24 0701)  SpO2: (!) 93 % (06/17/24 0717) Vital Signs (72h Range):  Temp:  [97.4 °F (36.3 °C)-101.9 °F (38.8 °C)]   Pulse:  []   Resp:  [16-26]   BP: ()/(51-78)   SpO2:  [91 %-97 %]      Recent Labs   Lab 06/15/24  0430 06/16/24  0432 06/17/24  0629   CREATININE 1.43* 2.60* 4.88*     Serum creatinine: 4.88 mg/dL (H) 06/17/24 0629  Estimated creatinine clearance: 17.4 mL/min (A)    Medication:pip/tazo dose: 4.5gm frequency q8h will be changed to medication:pip/tazo dose:4.5gm frequency:q12h    Pharmacist's Name: Deisy Adamson  Pharmacist's Extension: [phone]

## 2024-06-17 NOTE — SUBJECTIVE & OBJECTIVE
Interval History:     Review of Systems   Constitutional:  Positive for fever. Negative for activity change, appetite change and fatigue.   HENT:  Negative for congestion, ear pain, nosebleeds, sinus pressure, sore throat, tinnitus and trouble swallowing.    Eyes:  Negative for pain and itching.   Respiratory:  Negative for apnea, cough, choking, chest tightness, shortness of breath and wheezing.    Cardiovascular:  Negative for chest pain and leg swelling.   Gastrointestinal:  Negative for abdominal distention, abdominal pain, constipation, diarrhea, nausea and vomiting.   Endocrine: Negative for cold intolerance and heat intolerance.   Genitourinary:  Negative for dysuria, enuresis, frequency, penile swelling, scrotal swelling, testicular pain and urgency.   Musculoskeletal:  Negative for arthralgias, back pain, gait problem, joint swelling, myalgias and neck pain.   Skin:  Positive for color change and rash. Negative for pallor.   Allergic/Immunologic: Negative for environmental allergies and food allergies.   Neurological:  Negative for dizziness, tremors, seizures, syncope, numbness and headaches.   Psychiatric/Behavioral:  Negative for agitation, behavioral problems, dysphoric mood, hallucinations, self-injury and suicidal ideas. The patient is not nervous/anxious and is not hyperactive.      Objective:     Vital Signs (Most Recent):  Temp: 98.1 °F (36.7 °C) (06/17/24 1101)  Pulse: 70 (06/17/24 1101)  Resp: 20 (06/17/24 1101)  BP: 106/66 (06/17/24 1101)  SpO2: 95 % (06/17/24 1101) Vital Signs (24h Range):  Temp:  [97.6 °F (36.4 °C)-99.1 °F (37.3 °C)] 98.1 °F (36.7 °C)  Pulse:  [64-76] 70  Resp:  [16-20] 20  SpO2:  [92 %-95 %] 95 %  BP: ()/(51-69) 106/66     Weight: 115.9 kg (255 lb 9.6 oz)  Body mass index is 40.03 kg/m².    Intake/Output Summary (Last 24 hours) at 6/17/2024 1248  Last data filed at 6/17/2024 1238  Gross per 24 hour   Intake 1140 ml   Output --   Net 1140 ml         Physical  Exam  Constitutional:       General: He is not in acute distress.     Appearance: Normal appearance. He is ill-appearing. He is not toxic-appearing or diaphoretic.   HENT:      Head: Normocephalic and atraumatic.      Right Ear: External ear normal.      Left Ear: External ear normal.      Nose: Nose normal. No congestion or rhinorrhea.      Mouth/Throat:      Mouth: Mucous membranes are moist.      Pharynx: Oropharynx is clear.   Eyes:      Extraocular Movements: Extraocular movements intact.      Conjunctiva/sclera: Conjunctivae normal.      Pupils: Pupils are equal, round, and reactive to light.   Neck:      Vascular: No carotid bruit.   Cardiovascular:      Rate and Rhythm: Normal rate and regular rhythm.      Pulses: Normal pulses.      Heart sounds: Normal heart sounds. No murmur heard.  Pulmonary:      Effort: Pulmonary effort is normal. No respiratory distress.      Breath sounds: Normal breath sounds. No wheezing, rhonchi or rales.   Abdominal:      General: Abdomen is flat. Bowel sounds are normal. There is no distension.      Palpations: Abdomen is soft.      Tenderness: There is no abdominal tenderness. There is no guarding.   Musculoskeletal:         General: Swelling present. No tenderness. Normal range of motion.      Cervical back: Normal range of motion and neck supple. No tenderness.      Right lower leg: No edema.      Left lower leg: No edema.   Lymphadenopathy:      Cervical: No cervical adenopathy.   Skin:     General: Skin is warm and dry.      Coloration: Skin is not jaundiced or pale.      Findings: Erythema present.   Neurological:      General: No focal deficit present.      Mental Status: He is alert and oriented to person, place, and time. Mental status is at baseline.      Cranial Nerves: No cranial nerve deficit.      Motor: No weakness.      Coordination: Coordination normal.      Gait: Gait normal.   Psychiatric:         Mood and Affect: Mood normal.         Behavior: Behavior  normal.         Thought Content: Thought content normal.         Judgment: Judgment normal.             Significant Labs: All pertinent labs within the past 24 hours have been reviewed.    Significant Imaging: I have reviewed all pertinent imaging results/findings within the past 24 hours.

## 2024-06-17 NOTE — PROGRESS NOTES
[facility]/Surg  [facility] Medicine  Progress Note    Patient Name: Reid Mcqueen  MRN: [id number]  Patient Class: IP- Inpatient   Admission Date: 6/14/2024  Length of Stay: 3 days  Attending Physician: Adrian Milian MD  Primary Care Provider: Adán Unger III, MD        Subjective:     Principal Problem:Severe sepsis        HPI:  68 year old man with history of atrial fibrillation, COPD, HTN, BPH, HLD presented for lower extremity swelling/erythema for one week. Patient is on long term anticoagulation. Patient denies any trauma to bug bites in left lower extremity. Patient did report associated nausea/vomiting.      ED course: Blood cultures x2 obtained. CTA chest showed no PE. No DVT on US. Admitted for cellulitis           Past Medical History:   Diagnosis Date    A-fib      Allergy      Asthma      Kidney stone      Osteoarthritis      Osteoporosis        Overview/Hospital Course:  06/15/2024 patient in with severe cellulitis left lower extremity he states it continues to hurt severely redness has not changed much overnight.  We will continue his antibiotics follow-up blood cultures.  Ultrasound negative for DVT.  06/16/2024 patient in with severe cellulitis left lower extremity is doing little bit better.  Potassium is little bit low.  We will replace potassium p.o. continue his IV antibiotics follow-up his blood cultures which so far are no growth.  06/17/2024 68-year-old male came in with left lower extremity cellulitis which is severe.  Ultrasound negative for DVT.  Blood cultures are no growth so far be has developed kidney function impairment we suspect due to a tubular necrosis.  Continue his IV antibiotics his hydration repeat some labs tomorrow hopefully kidney function will continue to get better.  We will get a renal ultrasound.    Interval History:     Review of Systems   Constitutional:  Positive for fever. Negative for activity change, appetite change and fatigue.    HENT:  Negative for congestion, ear pain, nosebleeds, sinus pressure, sore throat, tinnitus and trouble swallowing.    Eyes:  Negative for pain and itching.   Respiratory:  Negative for apnea, cough, choking, chest tightness, shortness of breath and wheezing.    Cardiovascular:  Negative for chest pain and leg swelling.   Gastrointestinal:  Negative for abdominal distention, abdominal pain, constipation, diarrhea, nausea and vomiting.   Endocrine: Negative for cold intolerance and heat intolerance.   Genitourinary:  Negative for dysuria, enuresis, frequency, penile swelling, scrotal swelling, testicular pain and urgency.   Musculoskeletal:  Negative for arthralgias, back pain, gait problem, joint swelling, myalgias and neck pain.   Skin:  Positive for color change and rash. Negative for pallor.   Allergic/Immunologic: Negative for environmental allergies and food allergies.   Neurological:  Negative for dizziness, tremors, seizures, syncope, numbness and headaches.   Psychiatric/Behavioral:  Negative for agitation, behavioral problems, dysphoric mood, hallucinations, self-injury and suicidal ideas. The patient is not nervous/anxious and is not hyperactive.      Objective:     Vital Signs (Most Recent):  Temp: 98.1 °F (36.7 °C) (06/17/24 1101)  Pulse: 70 (06/17/24 1101)  Resp: 20 (06/17/24 1101)  BP: 106/66 (06/17/24 1101)  SpO2: 95 % (06/17/24 1101) Vital Signs (24h Range):  Temp:  [97.6 °F (36.4 °C)-99.1 °F (37.3 °C)] 98.1 °F (36.7 °C)  Pulse:  [64-76] 70  Resp:  [16-20] 20  SpO2:  [92 %-95 %] 95 %  BP: ()/(51-69) 106/66     Weight: 115.9 kg (255 lb 9.6 oz)  Body mass index is 40.03 kg/m².    Intake/Output Summary (Last 24 hours) at 6/17/2024 1248  Last data filed at 6/17/2024 1238  Gross per 24 hour   Intake 1140 ml   Output --   Net 1140 ml         Physical Exam  Constitutional:       General: He is not in acute distress.     Appearance: Normal appearance. He is ill-appearing. He is not toxic-appearing or  diaphoretic.   HENT:      Head: Normocephalic and atraumatic.      Right Ear: External ear normal.      Left Ear: External ear normal.      Nose: Nose normal. No congestion or rhinorrhea.      Mouth/Throat:      Mouth: Mucous membranes are moist.      Pharynx: Oropharynx is clear.   Eyes:      Extraocular Movements: Extraocular movements intact.      Conjunctiva/sclera: Conjunctivae normal.      Pupils: Pupils are equal, round, and reactive to light.   Neck:      Vascular: No carotid bruit.   Cardiovascular:      Rate and Rhythm: Normal rate and regular rhythm.      Pulses: Normal pulses.      Heart sounds: Normal heart sounds. No murmur heard.  Pulmonary:      Effort: Pulmonary effort is normal. No respiratory distress.      Breath sounds: Normal breath sounds. No wheezing, rhonchi or rales.   Abdominal:      General: Abdomen is flat. Bowel sounds are normal. There is no distension.      Palpations: Abdomen is soft.      Tenderness: There is no abdominal tenderness. There is no guarding.   Musculoskeletal:         General: Swelling present. No tenderness. Normal range of motion.      Cervical back: Normal range of motion and neck supple. No tenderness.      Right lower leg: No edema.      Left lower leg: No edema.   Lymphadenopathy:      Cervical: No cervical adenopathy.   Skin:     General: Skin is warm and dry.      Coloration: Skin is not jaundiced or pale.      Findings: Erythema present.   Neurological:      General: No focal deficit present.      Mental Status: He is alert and oriented to person, place, and time. Mental status is at baseline.      Cranial Nerves: No cranial nerve deficit.      Motor: No weakness.      Coordination: Coordination normal.      Gait: Gait normal.   Psychiatric:         Mood and Affect: Mood normal.         Behavior: Behavior normal.         Thought Content: Thought content normal.         Judgment: Judgment normal.             Significant Labs: All pertinent labs within the past  "24 hours have been reviewed.    Significant Imaging: I have reviewed all pertinent imaging results/findings within the past 24 hours.    Assessment/Plan:      * Severe sepsis  This patient does have evidence of infective focus  My overall impression is sepsis.  Source: Skin and Soft Tissue (location leg)  Antibiotics given-   Antibiotics (72h ago, onward)      Start     Stop Route Frequency Ordered    06/17/24 2100  piperacillin-tazobactam (ZOSYN) 4.5 g in dextrose 5 % in water (D5W) 100 mL IVPB (MB+)  (Skin & Soft Tissue Infection: Non-Purulent, Severe)         06/27/24 2059 IV Every 12 hours (non-standard times) 06/17/24 0940    06/15/24 1000  clindamycin in D5W 600 mg/50 mL IVPB 600 mg         -- IV Every 8 hours (non-standard times) 06/15/24 0852    06/14/24 1847  vancomycin - pharmacy to dose  (Skin & Soft Tissue Infection: Non-Purulent, Severe)        Placed in "And" Linked Group    -- IV pharmacy to manage frequency 06/14/24 1847          Latest lactate reviewed-  Recent Labs   Lab 06/14/24  2223   LACTATE 3.1*       Organ dysfunction indicated by Encephalopathy    Fluid challenge Actual Body weight- Patient will receive 30ml/kg actual body weight to calculate fluid bolus for treatment of septic shock.     Post- resuscitation assessment No - Post resuscitation assessment not needed       Will Not start Pressors- Levophed for MAP of 65    Acute renal failure  Patient with acute kidney injury/acute renal failure likely due to acute tubular necrosis caused by sepsis  CAROLINE is currently worsening. Baseline creatinine unknown - Labs reviewed- Renal function/electrolytes with Estimated Creatinine Clearance: 17.4 mL/min (A) (based on SCr of 4.88 mg/dL (H)). according to latest data. Monitor urine output and serial BMP and adjust therapy as needed. Avoid nephrotoxins and renally dose meds for GFR listed above.  06/17/2024 order renal ultrasound.  Continue IV hydration.    Hypokalemia  Patient has hypokalemia which is " Acute and currently uncontrolled. Most recent potassium levels reviewed-   Lab Results   Component Value Date    K 3.6 06/17/2024   . Will continue potassium replacement per protocol and recheck repeat levels after replacement completed.     Cellulitis of left lower extremity  Continue the antibiotics.  Repeat labs tomorrow.        VTE Risk Mitigation (From admission, onward)           Ordered     warfarin (COUMADIN) tablet 2.5 mg  Daily         06/15/24 0932     IP VTE HIGH RISK PATIENT  Once         06/14/24 1847     Reason for No Pharmacological VTE Prophylaxis  Once        Question:  Reasons:  Answer:  Already adequately anticoagulated on oral Anticoagulants    06/14/24 1847                    Discharge Planning   NORM:      Code Status: Full Code   Is the patient medically ready for discharge?:     Reason for patient still in hospital (select all that apply): Patient unstable  Discharge Plan A: Home                  Collin Baum MD  Department of Hospital Medicine   [facility]/Surg

## 2024-06-17 NOTE — ASSESSMENT & PLAN NOTE
"This patient does have evidence of infective focus  My overall impression is sepsis.  Source: Skin and Soft Tissue (location leg)  Antibiotics given-   Antibiotics (72h ago, onward)      Start     Stop Route Frequency Ordered    06/15/24 0130  piperacillin-tazobactam (ZOSYN) 4.5 g in dextrose 5 % in water (D5W) 100 mL IVPB (MB+)  (Skin & Soft Tissue Infection: Non-Purulent, Severe)         06/21/24 0129 IV Every 8 hours (non-standard times) 06/14/24 1847 06/14/24 1847  vancomycin - pharmacy to dose  (Skin & Soft Tissue Infection: Non-Purulent, Severe)        Placed in "And" Linked Group    -- IV pharmacy to manage frequency 06/14/24 1847          Latest lactate reviewed-  Recent Labs   Lab 06/14/24  2223   LACTATE 3.1*     Organ dysfunction indicated by Encephalopathy    Fluid challenge Actual Body weight- Patient will receive 30ml/kg actual body weight to calculate fluid bolus for treatment of septic shock.     Post- resuscitation assessment No - Post resuscitation assessment not needed       Will Not start Pressors- Levophed for MAP of 65  "
"This patient does have evidence of infective focus  My overall impression is sepsis.  Source: Skin and Soft Tissue (location leg)  Antibiotics given-   Antibiotics (72h ago, onward)    Start     Stop Route Frequency Ordered    06/15/24 1000  clindamycin in D5W 600 mg/50 mL IVPB 600 mg         -- IV Every 8 hours (non-standard times) 06/15/24 0852    06/15/24 0130  piperacillin-tazobactam (ZOSYN) 4.5 g in dextrose 5 % in water (D5W) 100 mL IVPB (MB+)  (Skin & Soft Tissue Infection: Non-Purulent, Severe)         06/21/24 0129 IV Every 8 hours (non-standard times) 06/14/24 1847    06/14/24 1847  vancomycin - pharmacy to dose  (Skin & Soft Tissue Infection: Non-Purulent, Severe)        Placed in "And" Linked Group    -- IV pharmacy to manage frequency 06/14/24 1847        Latest lactate reviewed-  Recent Labs   Lab 06/14/24  2223   LACTATE 3.1*       Organ dysfunction indicated by Encephalopathy    Fluid challenge Actual Body weight- Patient will receive 30ml/kg actual body weight to calculate fluid bolus for treatment of septic shock.     Post- resuscitation assessment No - Post resuscitation assessment not needed       Will Not start Pressors- Levophed for MAP of 65  "
"This patient does have evidence of infective focus  My overall impression is sepsis.  Source: Skin and Soft Tissue (location leg)  Antibiotics given-   Antibiotics (72h ago, onward)    Start     Stop Route Frequency Ordered    06/17/24 2100  piperacillin-tazobactam (ZOSYN) 4.5 g in dextrose 5 % in water (D5W) 100 mL IVPB (MB+)  (Skin & Soft Tissue Infection: Non-Purulent, Severe)         06/27/24 2059 IV Every 12 hours (non-standard times) 06/17/24 0940    06/15/24 1000  clindamycin in D5W 600 mg/50 mL IVPB 600 mg         -- IV Every 8 hours (non-standard times) 06/15/24 0852    06/14/24 1847  vancomycin - pharmacy to dose  (Skin & Soft Tissue Infection: Non-Purulent, Severe)        Placed in "And" Linked Group    -- IV pharmacy to manage frequency 06/14/24 1847        Latest lactate reviewed-  Recent Labs   Lab 06/14/24  2223   LACTATE 3.1*       Organ dysfunction indicated by Encephalopathy    Fluid challenge Actual Body weight- Patient will receive 30ml/kg actual body weight to calculate fluid bolus for treatment of septic shock.     Post- resuscitation assessment No - Post resuscitation assessment not needed       Will Not start Pressors- Levophed for MAP of 65  "
Continue the antibiotics.  Repeat labs tomorrow.    
Patient has hypokalemia which is Acute and currently uncontrolled. Most recent potassium levels reviewed-   Lab Results   Component Value Date    K 3.4 (L) 06/16/2024   . Will continue potassium replacement per protocol and recheck repeat levels after replacement completed.   
Patient has hypokalemia which is Acute and currently uncontrolled. Most recent potassium levels reviewed-   Lab Results   Component Value Date    K 3.6 06/17/2024   . Will continue potassium replacement per protocol and recheck repeat levels after replacement completed.   
Patient with acute kidney injury/acute renal failure likely due to acute tubular necrosis caused by sepsis  CAROLINE is currently worsening. Baseline creatinine unknown - Labs reviewed- Renal function/electrolytes with Estimated Creatinine Clearance: 17.4 mL/min (A) (based on SCr of 4.88 mg/dL (H)). according to latest data. Monitor urine output and serial BMP and adjust therapy as needed. Avoid nephrotoxins and renally dose meds for GFR listed above.  06/17/2024 order renal ultrasound.  Continue IV hydration.  
less than 2 seconds

## 2024-06-17 NOTE — PLAN OF CARE
06/17/24 1002   Discharge Assessment   Assessment Type Discharge Planning Assessment   Confirmed/corrected address, phone number and insurance Yes   Confirmed Demographics Correct on Facesheet   Source of Information patient   When was your last doctors appointment? 06/14/24   Communicated NORM with patient/caregiver Date not available/Unable to determine   Reason For Admission Cellulitis LLE   People in Home spouse   Facility Arrived From: Home   Do you expect to return to your current living situation? Yes   Prior to hospitilization cognitive status: Alert/Oriented   Current cognitive status: Alert/Oriented   Walking or Climbing Stairs Difficulty no   Dressing/Bathing Difficulty no   Equipment Currently Used at Home CPAP;nebulizer   Readmission within 30 days? No   Patient currently being followed by outpatient case management? No   Do you currently have service(s) that help you manage your care at home? No   Do you take prescription medications? Yes   Do you have prescription coverage? Yes   Coverage Medicare   Do you have any problems affording any of your prescribed medications? No   Is the patient taking medications as prescribed? yes   Who is going to help you get home at discharge? Spouse   How do you get to doctors appointments? car, drives self   Are you on dialysis? No   Do you take coumadin? Yes   Who monitors your labs?    Discharge Plan A Home   DME Needed Upon Discharge  none   Discharge Plan discussed with: Patient   Transition of Care Barriers None   Physical Activity   On average, how many days per week do you engage in moderate to strenuous exercise (like a brisk walk)? 0 days   On average, how many minutes do you engage in exercise at this level? 0 min   Financial Resource Strain   How hard is it for you to pay for the very basics like food, housing, medical care, and heating? Not very   Housing Stability   In the last 12 months, was there a time when you were not able to pay the  mortgage or rent on time? N   At any time in the past 12 months, were you homeless or living in a shelter (including now)? N   Transportation Needs   Has the lack of transportation kept you from medical appointments, meetings, work or from getting things needed for daily living? No   Food Insecurity   Within the past 12 months, you worried that your food would run out before you got the money to buy more. Never true   Within the past 12 months, the food you bought just didn't last and you didn't have money to get more. Never true   Stress   Do you feel stress - tense, restless, nervous, or anxious, or unable to sleep at night because your mind is troubled all the time - these days? Not at all   Social Isolation   How often do you feel lonely or isolated from those around you?  Never   Alcohol Use   Q1: How often do you have a drink containing alcohol? Monthly or l   Q2: How many drinks containing alcohol do you have on a typical day when you are drinking? 1 or 2   Q3: How often do you have six or more drinks on one occasion? Never   Utilities   In the past 12 months has the electric, gas, oil, or water company threatened to shut off services in your home? No   Health Literacy   How often do you need to have someone help you when you read instructions, pamphlets, or other written material from your doctor or pharmacy? Never   OTHER   Name(s) of People in Home Vanessa Mcqueen

## 2024-06-17 NOTE — PROGRESS NOTES
Pharmacokinetic Assessment Follow Up: IV Vancomycin    Vancomycin serum concentration assessment(s):    The random level was drawn correctly and can be used to guide therapy at this time. The measurement is within the desired definitive target range of 10 to 20 mcg/mL.  Worsening renal function.    Vancomycin Regimen Plan:  Scr trending up.  Vancomycin random 18 mcg/mL but concern for worsening renal function.  Patient also on clindamycin and pip/tazo.  Will hold off on vancomycin dose today.  Contacted Dr. Baum concerning JOHANNA and renal function.  Re-dose when the random level is less than 20 mcg/mL, next level to be drawn at 1100 on 06/18/24    Drug levels (last 3 results):  Recent Labs   Lab Result Units 06/15/24  1125 06/16/24  1156 06/17/24  1125   Vancomycin Random ug/ml 9.6  --   --    Vancomycin Trough ug/ml  --  22.2* 18.0       Pharmacy will continue to follow and monitor vancomycin.    Please contact pharmacy at extension [phone] for questions regarding this assessment.    Thank you for the consult,   Deisy Adamson       Patient brief summary:  Reid Mcqueen is a 68 y.o. male initiated on antimicrobial therapy with IV Vancomycin for treatment of skin & soft tissue infection      Drug Allergies:   Review of patient's allergies indicates:   Allergen Reactions    Cardizem [diltiazem hcl] Other (See Comments)       Actual Body Weight:   115.9kg    Renal Function:   Estimated Creatinine Clearance: 17.4 mL/min (A) (based on SCr of 4.88 mg/dL (H)).,       CBC (last 72 hours):  Recent Labs   Lab Result Units 06/14/24  1555 06/15/24  0715 06/16/24  0430 06/17/24  0629   WBC x10(3)/mcL 26.25* 21.52* 16.30* 10.34   Hgb g/dL 15.3 12.7* 12.0* 11.9*   Hct % 45.1 38.3 36.6 36.1   Platelet x10(3)/mcL 197 158 153 186   Mono % %  --  4.2* 8.0 10.0   Eos % %  --  0.0* 0.0* 0.0*   Basophil % %  --  0.1 0.2 0.2       Metabolic Panel (last 72 hours):  Recent Labs   Lab Result Units 06/14/24  1555 06/14/24  1733 06/15/24  7070  06/16/24  0432 06/17/24  0629   Sodium mmol/L 137  --  133* 133* 134*   Potassium mmol/L 3.6  --  3.9 3.4* 3.6   Chloride mmol/L 101  --  103 102 103   CO2 mmol/L 24  -- 24 24 21   Glucose mg/dL 166*  --  154* 137* 110   Glucose, UA   --  Negative  --   --   --    Blood Urea Nitrogen mg/dL 34*  --  28* 34* 51*   Creatinine mg/dL 1.69*  --  1.43* 2.60* 4.88*   Albumin g/dL 4.5  --  3.3* 3.3* 2.8*   Bilirubin Total mg/dL 1.2*  --  1.5* 1.1* 0.8   ALP unit/L 69  --  61 67 60   AST unit/L 35  --  32 30 41   ALT unit/L 35  -- 24 24 31   Magnesium Level mg/dL 1.80  --   --  2.40 2.50*       Vancomycin Administrations:  vancomycin given in the last 96 hours                     vancomycin 1,250 mg in dextrose 5 % (D5W) 250 mL IVPB (Vial-Mate) (mg) 1,250 mg New Bag 06/15/24 2330     1,250 mg New Bag  1311    vancomycin (VANCOCIN) 1,000 mg in dextrose 5 % (D5W) 250 mL IVPB (Vial-Mate) (mg) 1,000 mg New Bag 06/14/24 2217    vancomycin 1,500 mg in dextrose 5 % (D5W) 250 mL IVPB (Vial-Mate) (mg) 1,500 mg New Bag 06/14/24 1936                    Microbiologic Results:  Microbiology Results (last 7 days)       Procedure Component Value Units Date/Time    Blood Culture x two cultures. Draw prior to antibiotics [[id number]] Collected: 06/14/24 1756    Order Status: Completed Specimen: Blood Updated: 06/16/24 1900     Blood Culture No Growth At 48 Hours    Blood Culture x two cultures. Draw prior to antibiotics [[id number]] Collected: 06/14/24 1756    Order Status: Completed Specimen: Blood Updated: 06/16/24 1900     Blood Culture No Growth At 48 Hours

## 2024-06-17 NOTE — PLAN OF CARE
Problem: Adult Inpatient Plan of Care  Goal: Plan of Care Review  Outcome: Progressing  Goal: Patient-Specific Goal (Individualized)  Outcome: Progressing  Goal: Absence of Hospital-Acquired Illness or Injury  Outcome: Progressing  Goal: Optimal Comfort and Wellbeing  Outcome: Progressing  Goal: Readiness for Transition of Care  Outcome: Progressing     Problem: Bariatric Environmental Safety  Goal: Safety Maintained with Care  Outcome: Progressing     Problem: Sepsis/Septic Shock  Goal: Optimal Coping  Outcome: Progressing  Goal: Absence of Bleeding  Outcome: Progressing  Goal: Blood Glucose Level Within Targeted Range  Outcome: Progressing  Goal: Absence of Infection Signs and Symptoms  Outcome: Progressing  Goal: Optimal Nutrition Intake  Outcome: Progressing     Problem: Fall Injury Risk  Goal: Absence of Fall and Fall-Related Injury  Outcome: Progressing     Problem: Wound  Goal: Optimal Coping  Outcome: Progressing  Goal: Optimal Functional Ability  Outcome: Progressing  Goal: Absence of Infection Signs and Symptoms  Outcome: Progressing  Goal: Improved Oral Intake  Outcome: Progressing  Goal: Optimal Pain Control and Function  Outcome: Progressing  Goal: Skin Health and Integrity  Outcome: Progressing  Goal: Optimal Wound Healing  Outcome: Progressing     Problem: Skin or Soft Tissue Infection  Goal: Absence of Infection Signs and Symptoms  Outcome: Progressing     Problem: Acute Kidney Injury/Impairment  Goal: Fluid and Electrolyte Balance  Outcome: Progressing  Goal: Improved Oral Intake  Outcome: Progressing  Goal: Effective Renal Function  Outcome: Progressing

## 2024-06-17 NOTE — PROGRESS NOTES
Inpatient Nutrition Evaluation    Admit Date: 6/14/2024   Total duration of encounter: 2 days    Nutrition Recommendation/Prescription     Recommend Heart Healthy diet w/ coumadin diet restriction    Recommend Boost Plus BID to better meet nutritional needs (360 kcal, 14 gm pro ea)    Consider MVI     Continue replacing lytes per MD    Continue to encourage PO intake and honor patient preferences.    Dietitian will monitor Electrolytes, Food and Beverage Intake, Gastrointestinal Profile, Weight, and Weight Change and adjust MNT as needed.       Monitoring & Evaluation     Reason Seen: continuous nutrition monitoring    Nutrition Risk/Follow-Up: Low (follow-up in 5-7 days)  Patients assigned 'Low nutrition risk' status do not qualify for a full nutritional assessment but will be monitored and re-evaluated in a 5-7 day time period. Please consult if re-evaluation needed sooner.          Nutrition Assessment     Chart Review    Malnutrition Screening Tool Results   Have you recently lost weight without trying?: No  Have you been eating poorly because of a decreased appetite?: Yes   MST Score: 1     Diagnosis:  Severe Sepsis  Hypokalemia  Cellulitis of left lower extremity    Past Medical History:   Diagnosis Date    A-fib     Allergy     Asthma     BPH (benign prostatic hyperplasia)     COPD (chronic obstructive pulmonary disease)     HLD (hyperlipidemia)     Hypokalemia 06/16/2024    Kidney stone     Osteoarthritis     Osteoporosis      Past Surgical History:   Procedure Laterality Date    CATARACT EXTRACTION, BILATERAL         Scheduled Medications:  atorvastatin, 40 mg, QHS  cetirizine, 10 mg, Daily  clindamycin IV (PEDS and ADULTS), 600 mg, Q8H  fluticasone furoate-vilanteroL, 1 puff, Daily  lisinopriL, 10 mg, Daily  metoprolol succinate, 50 mg, Daily  piperacillin-tazobactam (Zosyn) IV (PEDS and ADULTS) (extended infusion is not appropriate), 4.5 g, Q8H  tamsulosin, 1 capsule, Nightly  torsemide, 20 mg,  Daily  warfarin, 2.5 mg, Daily    Continuous Infusions:   PRN Medications:   Current Facility-Administered Medications:     acetaminophen, 650 mg, Oral, Q8H PRN    albuterol sulfate, 2.5 mg, Nebulization, Q6H PRN    dextrose 10%, 12.5 g, Intravenous, PRN    dextrose 10%, 25 g, Intravenous, PRN    glucagon (human recombinant), 1 mg, Intramuscular, PRN    glucose, 16 g, Oral, PRN    glucose, 24 g, Oral, PRN    HYDROcodone-acetaminophen, 1 tablet, Oral, Q6H PRN    ibuprofen, 400 mg, Oral, Q6H PRN    insulin aspart U-100, 0-5 Units, Subcutaneous, QID (AC + HS) PRN    ondansetron, 4 mg, Intravenous, Q8H PRN    prochlorperazine, 5 mg, Intravenous, Q6H PRN    Pharmacy to dose Vancomycin consult, , , Once **AND** vancomycin - pharmacy to dose, , Intravenous, pharmacy to manage frequency    Calorie Containing IV Medications: no significant kcals from medications at this time    Nutrition-Related Labs:  Recent Labs   Lab 06/14/24  1555 06/15/24  0430 06/15/24  0715 06/16/24  0430 06/16/24  0432    133*  --   --  133*   K 3.6 3.9  --   --  3.4*   CALCIUM 9.2 8.0*  --   --  8.2*   MG 1.80  --   --   --  2.40   CO2 24 24  --   --  24   BUN 34* 28*  --   --  34*   CREATININE 1.69* 1.43*  --   --  2.60*   EGFRNORACEVR 43 53  --   --  26   GLUCOSE 166* 154*  --   --  137*   BILITOT 1.2* 1.5*  --   --  1.1*   ALKPHOS 69 61  --   --  67   ALT 35 24  --   --  24   AST 35 32  --   --  30   ALBUMIN 4.5 3.3*  --   --  3.3*   WBC 26.25*  --  21.52* 16.30*  --    HGB 15.3  --  12.7* 12.0*  --    HCT 45.1  --  38.3 36.6  --      CrCl:    Lab Value: 32.6    Date: 06/16    Nutrition Orders:  Diet Heart Healthy    Other Oral Supplement Order: None/Already listed above  Appetite/Oral Intake: poor/0-25% of meals  Factors Affecting Nutritional Intake: altered gastrointestinal function, decreased appetite, nausea, and vomiting  Food/Islam/Cultural Preferences: unable to obtain  Food Allergies:   Review of patient's allergies indicates:  "  Allergen Reactions    Cardizem [diltiazem hcl] Other (See Comments)       Skin Integrity: bruised (ecchymotic), abrasion, scab  Wound(s):      Wound 06/14/24 1910 Other (comment) Left anterior;lower Leg-Tissue loss description: Not applicable - CELLULITIS    Overview/Hospital Course:    (06/16) SCREENED D/T SEPSIS, 68 Y.O MALE ADMITTED W. LOWER EXTREMITY SWELLING/ERYTHEMA X1 WEEK W/ N/V- PT ON LONG TERM ANTICOAGULATION PER MD, LOW K+- REPLACING K+ P.O PER MD, CURRENTLY RECEIVING WARFARIN DURING STAY, HEART HEALTHY DIET- POOR APT, CONSUMED 10-15% X3 MEALS, 2+ MILD GENERALIZED EDEMA, 3+ MODERATE EDEMA TO LT LEG, ANKLE, & FOOT, NUTR SCORE: 3, BRDN SCORE: 20, ABD OBESE, LBM YESTERDAY, PER EMR    Anthropometrics    Height: 5' 7" (170.2 cm) Height Method: Stated  Last Weight: 115.9 kg (255 lb 9.6 oz) (06/14/24 1845) Weight Method: Bed Scale  BMI (Calculated): 40  BMI Classification: obese grade III (BMI >/=40)        Ideal Body Weight (IBW), Male: 148 lb     % Ideal Body Weight, Male (lb): 172.7 %                          Usual Weight Provided By: EMR weight history    Wt Readings from Last 5 Encounters:   06/14/24 115.9 kg (255 lb 9.6 oz)   03/21/14 107.8 kg (237 lb 9.6 oz)   11/05/13 100.5 kg (221 lb 9.6 oz)     Weight Change(s) Since Admission:  Admit Weight: 118.9 kg (262 lb 2 oz) (06/14/24 1601)      Patient Education    Not applicable.           "

## 2024-06-17 NOTE — PLAN OF CARE
Problem: Adult Inpatient Plan of Care  Goal: Plan of Care Review  Outcome: Progressing  Goal: Patient-Specific Goal (Individualized)  Outcome: Progressing  Goal: Absence of Hospital-Acquired Illness or Injury  Outcome: Progressing  Goal: Optimal Comfort and Wellbeing  Outcome: Progressing  Intervention: Monitor Pain and Promote Comfort  Flowsheets (Taken 6/17/2024 0610)  Pain Management Interventions: pain management plan reviewed with patient/caregiver  Intervention: Provide Person-Centered Care  Flowsheets (Taken 6/17/2024 0610)  Trust Relationship/Rapport:   care explained   questions encouraged   questions answered     Problem: Bariatric Environmental Safety  Goal: Safety Maintained with Care  Outcome: Progressing     Problem: Sepsis/Septic Shock  Goal: Optimal Coping  Outcome: Progressing  Goal: Absence of Bleeding  Outcome: Progressing  Goal: Blood Glucose Level Within Targeted Range  Outcome: Progressing  Goal: Absence of Infection Signs and Symptoms  Outcome: Progressing  Intervention: Promote Recovery  Flowsheets (Taken 6/17/2024 0610)  Sleep/Rest Enhancement:   awakenings minimized   consistent schedule promoted   regular sleep/rest pattern promoted  Activity Management: Rolling - L1  Goal: Optimal Nutrition Intake  Outcome: Progressing     Problem: Fall Injury Risk  Goal: Absence of Fall and Fall-Related Injury  Outcome: Progressing     Problem: Wound  Goal: Optimal Coping  Outcome: Progressing  Goal: Optimal Functional Ability  Outcome: Progressing  Goal: Absence of Infection Signs and Symptoms  Outcome: Progressing  Goal: Improved Oral Intake  Outcome: Progressing  Goal: Optimal Pain Control and Function  Outcome: Progressing  Goal: Skin Health and Integrity  Outcome: Progressing  Goal: Optimal Wound Healing  Outcome: Progressing  Intervention: Promote Wound Healing  Flowsheets (Taken 6/17/2024 0610)  Sleep/Rest Enhancement:   awakenings minimized   consistent schedule promoted   regular sleep/rest  pattern promoted     Problem: Skin or Soft Tissue Infection  Goal: Absence of Infection Signs and Symptoms  Outcome: Progressing  Intervention: Minimize and Manage Infection Progression  Flowsheets (Taken 6/17/2024 0610)  Infection Prevention: rest/sleep promoted  Fever Reduction/Comfort Measures: lightweight bedding  Intervention: Provide Meticulous Infection Site Care  Flowsheets (Taken 6/17/2024 0610)  Topical Inflammation Care: border marked

## 2024-06-18 ENCOUNTER — HOSPITAL ENCOUNTER (INPATIENT)
Facility: HOSPITAL | Age: 69
LOS: 4 days | Discharge: HOME | DRG: 683 | End: 2024-06-22
Attending: INTERNAL MEDICINE | Admitting: INTERNAL MEDICINE
Payer: MEDICARE

## 2024-06-18 VITALS
BODY MASS INDEX: 40.12 KG/M2 | HEIGHT: 67 IN | WEIGHT: 255.63 LBS | OXYGEN SATURATION: 96 % | TEMPERATURE: 98 F | RESPIRATION RATE: 16 BRPM | DIASTOLIC BLOOD PRESSURE: 66 MMHG | HEART RATE: 69 BPM | SYSTOLIC BLOOD PRESSURE: 111 MMHG

## 2024-06-18 DIAGNOSIS — L03.116 CELLULITIS OF LEFT LOWER EXTREMITY: ICD-10-CM

## 2024-06-18 DIAGNOSIS — N17.9 ACUTE RENAL FAILURE, UNSPECIFIED ACUTE RENAL FAILURE TYPE: Primary | ICD-10-CM

## 2024-06-18 DIAGNOSIS — R79.89 ELEVATED BRAIN NATRIURETIC PEPTIDE (BNP) LEVEL: ICD-10-CM

## 2024-06-18 DIAGNOSIS — A41.9 SEPSIS: ICD-10-CM

## 2024-06-18 DIAGNOSIS — R79.89 TROPONIN LEVEL ELEVATED: ICD-10-CM

## 2024-06-18 DIAGNOSIS — R07.9 CHEST PAIN: ICD-10-CM

## 2024-06-18 LAB
ALBUMIN SERPL-MCNC: 3.3 G/DL (ref 3.4–5)
ALBUMIN/GLOB SERPL: 0.8 RATIO
ALP SERPL-CCNC: 43 UNIT/L (ref 50–144)
ALT SERPL-CCNC: 34 UNIT/L (ref 1–45)
ANION GAP SERPL CALC-SCNC: 9 MEQ/L (ref 2–13)
APICAL FOUR CHAMBER EJECTION FRACTION: 55 %
APICAL TWO CHAMBER EJECTION FRACTION: 55 %
ASCENDING AORTA: 4 CM
AST SERPL-CCNC: 51 UNIT/L (ref 17–59)
AV INDEX (PROSTH): 0.74
AV MEAN GRADIENT: 6 MMHG
AV PEAK GRADIENT: 10 MMHG
AV VALVE AREA BY VELOCITY RATIO: 2.86 CM²
AV VALVE AREA: 3.3 CM²
AV VELOCITY RATIO: 0.64
BASOPHILS # BLD AUTO: 0.02 X10(3)/MCL (ref 0.01–0.08)
BASOPHILS NFR BLD AUTO: 0.2 % (ref 0.1–1.2)
BILIRUB SERPL-MCNC: 1 MG/DL (ref 0–1)
BSA FOR ECHO PROCEDURE: 2.34 M2
BUN SERPL-MCNC: 62 MG/DL (ref 7–20)
CALCIUM SERPL-MCNC: 8.4 MG/DL (ref 8.4–10.2)
CHLORIDE SERPL-SCNC: 103 MMOL/L (ref 98–110)
CHLORIDE UR-SCNC: 31 MMOL/L
CO2 SERPL-SCNC: 22 MMOL/L (ref 21–32)
CREAT SERPL-MCNC: 5.15 MG/DL (ref 0.66–1.25)
CREAT UR-MCNC: 49.6 MG/DL (ref 63–166)
CREAT UR-MCNC: 49.7 MG/DL (ref 63–166)
CREAT/UREA NIT SERPL: 12 (ref 12–20)
CV ECHO LV RWT: 0.47 CM
DOP CALC AO PEAK VEL: 1.57 M/S
DOP CALC AO VTI: 28.8 CM
DOP CALC LVOT AREA: 4.5 CM2
DOP CALC LVOT DIAMETER: 2.39 CM
DOP CALC LVOT PEAK VEL: 1 M/S
DOP CALC LVOT STROKE VOLUME: 95.06 CM3
DOP CALC MV VTI: 33 CM
DOP CALCLVOT PEAK VEL VTI: 21.2 CM
E WAVE DECELERATION TIME: 261.64 MSEC
E/A RATIO: 1.47
E/E' RATIO: 9.4 M/S
ECHO LV POSTERIOR WALL: 1.14 CM (ref 0.6–1.1)
EOSINOPHIL # BLD AUTO: 0 X10(3)/MCL (ref 0.04–0.54)
EOSINOPHIL NFR BLD AUTO: 0 % (ref 0.7–7)
ERYTHROCYTE [DISTWIDTH] IN BLOOD BY AUTOMATED COUNT: 14 %
FRACTIONAL SHORTENING: 32 % (ref 28–44)
GFR SERPLBLD CREATININE-BSD FMLA CKD-EPI: 11 ML/MIN/1.73/M2
GLOBULIN SER-MCNC: 3.9 GM/DL (ref 2–3.9)
GLUCOSE SERPL-MCNC: 105 MG/DL (ref 70–115)
HCT VFR BLD AUTO: 37.1 % (ref 36–52)
HGB BLD-MCNC: 12.4 G/DL (ref 13–18)
HR MV ECHO: 68 BPM
IMM GRANULOCYTES # BLD AUTO: 0.16 X10(3)/MCL (ref 0–0.03)
IMM GRANULOCYTES NFR BLD AUTO: 1.4 % (ref 0–0.5)
INR PPP: 1.8
INTERVENTRICULAR SEPTUM: 1.37 CM (ref 0.6–1.1)
LEFT ATRIUM SIZE: 3.72 CM
LEFT ATRIUM VOLUME INDEX MOD: 34.8 ML/M2
LEFT ATRIUM VOLUME MOD: 78 CM3
LEFT INTERNAL DIMENSION IN SYSTOLE: 3.28 CM (ref 2.1–4)
LEFT VENTRICLE DIASTOLIC VOLUME INDEX: 48.75 ML/M2
LEFT VENTRICLE DIASTOLIC VOLUME: 109.19 ML
LEFT VENTRICLE END DIASTOLIC VOLUME APICAL 2 CHAMBER: 97.41 ML
LEFT VENTRICLE END DIASTOLIC VOLUME APICAL 4 CHAMBER: 129.6 ML
LEFT VENTRICLE MASS INDEX: 105 G/M2
LEFT VENTRICLE SYSTOLIC VOLUME INDEX: 19.4 ML/M2
LEFT VENTRICLE SYSTOLIC VOLUME: 43.47 ML
LEFT VENTRICULAR INTERNAL DIMENSION IN DIASTOLE: 4.83 CM (ref 3.5–6)
LEFT VENTRICULAR MASS: 235.86 G
LV LATERAL E/E' RATIO: 8.55 M/S
LV SEPTAL E/E' RATIO: 10.44 M/S
LVED V (TEICH): 109.19 ML
LVES V (TEICH): 43.47 ML
LVOT MG: 2.22 MMHG
LVOT MV: 0.69 CM/S
LYMPHOCYTES # BLD AUTO: 1.22 X10(3)/MCL (ref 1.32–3.57)
LYMPHOCYTES NFR BLD AUTO: 10.5 % (ref 20–55)
MAGNESIUM SERPL-MCNC: 2.5 MG/DL (ref 1.8–2.4)
MCH RBC QN AUTO: 31.5 PG (ref 27–34)
MCHC RBC AUTO-ENTMCNC: 33.4 G/DL (ref 31–37)
MCV RBC AUTO: 94.2 FL (ref 79–99)
MONOCYTES # BLD AUTO: 0.95 X10(3)/MCL (ref 0.3–0.82)
MONOCYTES NFR BLD AUTO: 8.2 % (ref 4.7–12.5)
MV MEAN GRADIENT: 2 MMHG
MV PEAK A VEL: 0.64 M/S
MV PEAK E VEL: 0.94 M/S
MV PEAK GRADIENT: 5 MMHG
MV STENOSIS PRESSURE HALF TIME: 75.88 MS
MV VALVE AREA BY CONTINUITY EQUATION: 2.88 CM2
MV VALVE AREA P 1/2 METHOD: 2.9 CM2
NEUTROPHILS # BLD AUTO: 9.25 X10(3)/MCL (ref 1.78–5.38)
NEUTROPHILS NFR BLD AUTO: 79.7 % (ref 37–73)
OHS CV RV/LV RATIO: 0.99 CM
OHS LV EJECTION FRACTION SIMPSONS BIPLANE MOD: 55 %
OSMOLALITY UR: 213 MOSM/KG (ref 300–1300)
PISA TR MAX VEL: 2.53 M/S
PLATELET # BLD AUTO: 199 X10(3)/MCL (ref 140–371)
PMV BLD AUTO: 9.9 FL (ref 9.4–12.4)
POTASSIUM SERPL-SCNC: 4 MMOL/L (ref 3.5–5.1)
PROT SERPL-MCNC: 7.2 GM/DL (ref 6.3–8.2)
PROT UR STRIP-MCNC: 12.1 MG/DL
PROTHROMBIN TIME: 18 SECONDS (ref 9.3–11.9)
RBC # BLD AUTO: 3.94 X10(6)/MCL (ref 4–6)
RIGHT VENTRICULAR END-DIASTOLIC DIMENSION: 4.78 CM
SINUS: 3.9 CM
SODIUM SERPL-SCNC: 134 MMOL/L (ref 136–145)
SODIUM UR-SCNC: 35 MMOL/L
TDI LATERAL: 0.11 M/S
TDI SEPTAL: 0.09 M/S
TDI: 0.1 M/S
TR MAX PG: 26 MMHG
TRICUSPID ANNULAR PLANE SYSTOLIC EXCURSION: 2.52 CM
URINE PROTEIN/CREATININE RATIO (OLG): 0.2
WBC # BLD AUTO: 11.6 X10(3)/MCL (ref 4–11.5)
Z-SCORE OF LEFT VENTRICULAR DIMENSION IN END DIASTOLE: -5.06
Z-SCORE OF LEFT VENTRICULAR DIMENSION IN END SYSTOLE: -3.1

## 2024-06-18 PROCEDURE — 94761 N-INVAS EAR/PLS OXIMETRY MLT: CPT

## 2024-06-18 PROCEDURE — 83935 ASSAY OF URINE OSMOLALITY: CPT

## 2024-06-18 PROCEDURE — 94640 AIRWAY INHALATION TREATMENT: CPT

## 2024-06-18 PROCEDURE — 63600175 PHARM REV CODE 636 W HCPCS: Mod: JZ,JG | Performed by: INTERNAL MEDICINE

## 2024-06-18 PROCEDURE — 93005 ELECTROCARDIOGRAM TRACING: CPT

## 2024-06-18 PROCEDURE — 25000003 PHARM REV CODE 250

## 2024-06-18 PROCEDURE — 84156 ASSAY OF PROTEIN URINE: CPT

## 2024-06-18 PROCEDURE — 25000242 PHARM REV CODE 250 ALT 637 W/ HCPCS

## 2024-06-18 PROCEDURE — 21400001 HC TELEMETRY ROOM

## 2024-06-18 PROCEDURE — 80053 COMPREHEN METABOLIC PANEL: CPT | Performed by: FAMILY MEDICINE

## 2024-06-18 PROCEDURE — 63600175 PHARM REV CODE 636 W HCPCS: Performed by: FAMILY MEDICINE

## 2024-06-18 PROCEDURE — 36415 COLL VENOUS BLD VENIPUNCTURE: CPT | Performed by: FAMILY MEDICINE

## 2024-06-18 PROCEDURE — 25000003 PHARM REV CODE 250: Performed by: INTERNAL MEDICINE

## 2024-06-18 PROCEDURE — 99900035 HC TECH TIME PER 15 MIN (STAT)

## 2024-06-18 PROCEDURE — 85025 COMPLETE CBC W/AUTO DIFF WBC: CPT | Performed by: FAMILY MEDICINE

## 2024-06-18 PROCEDURE — 84300 ASSAY OF URINE SODIUM: CPT

## 2024-06-18 PROCEDURE — 83735 ASSAY OF MAGNESIUM: CPT | Performed by: INTERNAL MEDICINE

## 2024-06-18 PROCEDURE — 82436 ASSAY OF URINE CHLORIDE: CPT

## 2024-06-18 PROCEDURE — 25000003 PHARM REV CODE 250: Performed by: FAMILY MEDICINE

## 2024-06-18 PROCEDURE — 85610 PROTHROMBIN TIME: CPT | Performed by: FAMILY MEDICINE

## 2024-06-18 PROCEDURE — 82570 ASSAY OF URINE CREATININE: CPT

## 2024-06-18 PROCEDURE — 25000003 PHARM REV CODE 250: Performed by: NURSE PRACTITIONER

## 2024-06-18 RX ORDER — ACETAMINOPHEN 325 MG/1
650 TABLET ORAL EVERY 4 HOURS PRN
Status: DISCONTINUED | OUTPATIENT
Start: 2024-06-18 | End: 2024-06-22 | Stop reason: HOSPADM

## 2024-06-18 RX ORDER — FLUTICASONE FUROATE AND VILANTEROL 200; 25 UG/1; UG/1
1 POWDER RESPIRATORY (INHALATION) DAILY
Status: DISCONTINUED | OUTPATIENT
Start: 2024-06-18 | End: 2024-06-22 | Stop reason: HOSPADM

## 2024-06-18 RX ORDER — GLUCAGON 1 MG
1 KIT INJECTION
Status: DISCONTINUED | OUTPATIENT
Start: 2024-06-18 | End: 2024-06-22 | Stop reason: HOSPADM

## 2024-06-18 RX ORDER — METOPROLOL SUCCINATE 50 MG/1
50 TABLET, EXTENDED RELEASE ORAL DAILY
Status: DISCONTINUED | OUTPATIENT
Start: 2024-06-18 | End: 2024-06-22 | Stop reason: HOSPADM

## 2024-06-18 RX ORDER — ALUMINUM HYDROXIDE, MAGNESIUM HYDROXIDE, AND SIMETHICONE 1200; 120; 1200 MG/30ML; MG/30ML; MG/30ML
30 SUSPENSION ORAL 4 TIMES DAILY PRN
Status: DISCONTINUED | OUTPATIENT
Start: 2024-06-18 | End: 2024-06-22 | Stop reason: HOSPADM

## 2024-06-18 RX ORDER — ALBUTEROL SULFATE 90 UG/1
2 AEROSOL, METERED RESPIRATORY (INHALATION) EVERY 6 HOURS PRN
Status: DISCONTINUED | OUTPATIENT
Start: 2024-06-18 | End: 2024-06-22 | Stop reason: HOSPADM

## 2024-06-18 RX ORDER — HEPARIN SODIUM 5000 [USP'U]/ML
5000 INJECTION, SOLUTION INTRAVENOUS; SUBCUTANEOUS EVERY 12 HOURS
Status: DISCONTINUED | OUTPATIENT
Start: 2024-06-18 | End: 2024-06-18

## 2024-06-18 RX ORDER — WARFARIN 2.5 MG/1
2.5 TABLET ORAL DAILY
Status: DISCONTINUED | OUTPATIENT
Start: 2024-06-18 | End: 2024-06-22 | Stop reason: HOSPADM

## 2024-06-18 RX ORDER — ATORVASTATIN CALCIUM 40 MG/1
40 TABLET, FILM COATED ORAL NIGHTLY
Status: DISCONTINUED | OUTPATIENT
Start: 2024-06-19 | End: 2024-06-22 | Stop reason: HOSPADM

## 2024-06-18 RX ORDER — SODIUM CHLORIDE 9 MG/ML
INJECTION, SOLUTION INTRAVENOUS CONTINUOUS
Status: DISCONTINUED | OUTPATIENT
Start: 2024-06-18 | End: 2024-06-20

## 2024-06-18 RX ORDER — ACETAMINOPHEN 325 MG/1
650 TABLET ORAL EVERY 8 HOURS PRN
Status: DISCONTINUED | OUTPATIENT
Start: 2024-06-18 | End: 2024-06-22 | Stop reason: HOSPADM

## 2024-06-18 RX ORDER — MUPIROCIN 20 MG/G
OINTMENT TOPICAL 2 TIMES DAILY
Status: DISCONTINUED | OUTPATIENT
Start: 2024-06-18 | End: 2024-06-22 | Stop reason: HOSPADM

## 2024-06-18 RX ORDER — SODIUM CHLORIDE 0.9 % (FLUSH) 0.9 %
10 SYRINGE (ML) INJECTION EVERY 12 HOURS PRN
Status: DISCONTINUED | OUTPATIENT
Start: 2024-06-18 | End: 2024-06-22 | Stop reason: HOSPADM

## 2024-06-18 RX ORDER — NALOXONE HCL 0.4 MG/ML
0.02 VIAL (ML) INJECTION
Status: DISCONTINUED | OUTPATIENT
Start: 2024-06-18 | End: 2024-06-22 | Stop reason: HOSPADM

## 2024-06-18 RX ORDER — IBUPROFEN 200 MG
16 TABLET ORAL
Status: DISCONTINUED | OUTPATIENT
Start: 2024-06-18 | End: 2024-06-22 | Stop reason: HOSPADM

## 2024-06-18 RX ORDER — IBUPROFEN 200 MG
24 TABLET ORAL
Status: DISCONTINUED | OUTPATIENT
Start: 2024-06-18 | End: 2024-06-22 | Stop reason: HOSPADM

## 2024-06-18 RX ADMIN — SODIUM CHLORIDE: 4.5 INJECTION, SOLUTION INTRAVENOUS at 12:06

## 2024-06-18 RX ADMIN — METOPROLOL SUCCINATE 50 MG: 50 TABLET, EXTENDED RELEASE ORAL at 09:06

## 2024-06-18 RX ADMIN — CLINDAMYCIN PHOSPHATE 600 MG: 600 INJECTION, SOLUTION INTRAVENOUS at 01:06

## 2024-06-18 RX ADMIN — SODIUM CHLORIDE: 9 INJECTION, SOLUTION INTRAVENOUS at 05:06

## 2024-06-18 RX ADMIN — MUPIROCIN: 20 OINTMENT TOPICAL at 08:06

## 2024-06-18 RX ADMIN — PIPERACILLIN AND TAZOBACTAM 4.5 G: 4; .5 INJECTION, POWDER, FOR SOLUTION INTRAVENOUS; PARENTERAL at 09:06

## 2024-06-18 RX ADMIN — SODIUM CHLORIDE 1000 ML: 9 INJECTION, SOLUTION INTRAVENOUS at 02:06

## 2024-06-18 RX ADMIN — CETIRIZINE HYDROCHLORIDE 10 MG: 10 TABLET, FILM COATED ORAL at 09:06

## 2024-06-18 RX ADMIN — FLUTICASONE FUROATE AND VILANTEROL TRIFENATATE 1 PUFF: 200; 25 POWDER RESPIRATORY (INHALATION) at 02:06

## 2024-06-18 RX ADMIN — FLUTICASONE FUROATE AND VILANTEROL TRIFENATATE 1 PUFF: 200; 25 POWDER RESPIRATORY (INHALATION) at 07:06

## 2024-06-18 RX ADMIN — WARFARIN SODIUM 2.5 MG: 2.5 TABLET ORAL at 09:06

## 2024-06-18 RX ADMIN — LISINOPRIL 10 MG: 10 TABLET ORAL at 09:06

## 2024-06-18 RX ADMIN — CLINDAMYCIN PHOSPHATE 600 MG: 600 INJECTION, SOLUTION INTRAVENOUS at 09:06

## 2024-06-18 NOTE — NURSING TRANSFER
Nursing Transfer Note      6/18/2024   10:23 AM    Nurse giving handoff:Chhaya REYES   Nurse receiving handoff:Kait    Reason patient is being transferred: Higher level of care    Transfer To: [facility]    Transfer via wheelchair    Transfer with cardiac monitoring    Transported by [facility]    Transfer Vital Signs:  Blood Pressure:111/66  Heart Rate:69  O2:96   Temperature:98.1  Respirations:18      4eyes on Skin: yes    Medicines sent: no    Any special needs or follow-up needed: no      Notified: spouse

## 2024-06-18 NOTE — DISCHARGE SUMMARY
Hospital Medicine  Discharge Summary    Patient Name: Reid Mcqueen  MRN: [id number]  Admit Date: 6/14/2024  Discharge Date:  6/18/2024  Status: IP- Inpatient   Length of Stay: 4      PHYSICIANS   Admitting Physician: Adrian Milian MD  Discharging Physician: Adrian Milian MD.  Primary Care Physician: Adán Unger III, MD        DISCHARGE DIAGNOSES   Severe sepsis   Acute renal failure   Hypokalemia   Cellulitis of left lower extremity   PROCEDURES   * No surgery found *         HOSPITAL COURSE      68 year old man with history of atrial fibrillation, COPD, HTN, BPH, HLD presented for lower extremity swelling/erythema for one week. Patient is on long term anticoagulation. Patient denies any trauma to bug bites in left lower extremity. Patient did report associated nausea/vomiting.   ED course: Blood cultures x2 obtained. CTA chest showed no PE. No DVT on US. Admitted for cellulitis   06/15/2024 patient in with severe cellulitis left lower extremity he states it continues to hurt severely redness has not changed much overnight.  We will continue his antibiotics follow-up blood cultures.  Ultrasound negative for DVT.  06/16/2024 patient in with severe cellulitis left lower extremity is doing little bit better.  Potassium is little bit low.  We will replace potassium p.o. continue his IV antibiotics follow-up his blood cultures which so far are no growth.  06/18/2024 LLE cellulitis no improvement overngiht   renal fxn cont to worsen overnight   Plan to transfer out for [facility] nephrology    STATUS  Stable    DISPOSITION  Discharge to transfer for [facility] - [facility]  cardiac    ACTIVITY  As tolerated      FOLLOW-UP         DISCHARGE MEDICATION RECONCILIATION        Medication List        ASK your doctor about these medications      acetaminophen 650 MG Tbsr  Commonly known as: TYLENOL     albuterol 90 mcg/actuation inhaler  Commonly known as: PROVENTIL/VENTOLIN HFA     atorvastatin  "40 MG tablet  Commonly known as: LIPITOR     BREO ELLIPTA 200-25 mcg/dose Dsdv diskus inhaler  Generic drug: fluticasone furoate-vilanteroL     fexofenadine 180 MG tablet  Commonly known as: ALLEGRA     lisinopriL 10 MG tablet     metoprolol succinate 50 MG 24 hr tablet  Commonly known as: TOPROL-XL     tamsulosin 0.4 mg Cap  Commonly known as: FLOMAX     torsemide 20 MG Tab  Commonly known as: DEMADEX     warfarin 2.5 MG tablet  Commonly known as: COUMADIN                PHYSICAL EXAM   VITALS: T 98.1 °F (36.7 °C)   /66   P 69   RR 16   O2 96 %    Physical Exam  Vitals reviewed.   HENT:      Head: Normocephalic.   Cardiovascular:      Rate and Rhythm: Normal rate.      Heart sounds: Normal heart sounds.   Pulmonary:      Effort: Pulmonary effort is normal.   Musculoskeletal:         General: Swelling present.      Left lower leg: Edema present.      Comments: LLE cellulitis   Neurological:      Mental Status: He is alert.              DIAGNOSITCS   CBC:   Recent Labs   Lab 06/16/24 0430 06/17/24 0629 06/18/24  0414   WBC 16.30* 10.34 11.60*   HGB 12.0* 11.9* 12.4*   HCT 36.6 36.1 37.1    186 199       CMP:   Recent Labs   Lab 06/16/24 0432 06/17/24 0629 06/18/24  0414   CALCIUM 8.2* 8.2* 8.4   ALBUMIN 3.3* 2.8* 3.3*   * 134* 134*   K 3.4* 3.6 4.0   CO2 24 21 22    103 103   BUN 34* 51* 62*   CREATININE 2.60* 4.88* 5.15*   ALKPHOS 67 60 43*   ALT 24 31 34   AST 30 41 51   BILITOT 1.1* 0.8 1.0   MG 2.40 2.50* 2.50*     Estimated Creatinine Clearance: 16.5 mL/min (A) (based on SCr of 5.15 mg/dL (H)).    Labs within the past 24 hours have been reviewed.     COAG:  Recent Labs   Lab 06/16/24 0430 06/17/24 0629 06/18/24  0414   INR 1.4 1.6 1.8       CARDIAC ENZYMES: No results for input(s): "TROPONINI", "CPK", "CKMB" in the last 72 hours.     No results for input(s): "AMYLASE", "LIPASE" in the last 168 hours.    No results for input(s): "POCTGLUCOSE" in the last 72 hours.     Microbiology " "Results (last 7 days)       Procedure Component Value Units Date/Time    Blood Culture x two cultures. Draw prior to antibiotics [[id number]] Collected: 06/14/24 1756    Order Status: Completed Specimen: Blood Updated: 06/17/24 1901     Blood Culture No Growth At 72 Hours    Blood Culture x two cultures. Draw prior to antibiotics [[id number]] Collected: 06/14/24 1756    Order Status: Completed Specimen: Blood Updated: 06/17/24 1901     Blood Culture No Growth At 72 Hours             No results for input(s): "CHOL", "TRIG", "HDL", "LDL" in the last 72 hours. No results found for: "LABA1C", "HGBA1C"     US Kidney    Result Date: 6/17/2024  EXAMINATION: STUDY: US KIDNEY CLINICAL HISTORY AND TECHNIQUE: * Marguerite Power RT on 6/17/2024  1:55 PM CDT: INPT CLINICAL HX; ELEVATED CREATINE, SEVERE SEPSIS COMPARISON: None FINDINGS: The right kidney measures 12.5 cm in length and the left kidney measures 11.5 cm in length. Multiple (x2), benign-appearing cortical cysts measuring as large as 5 cm in greatest diameter are noted originating from the right kidney.  No additional renal parenchymal masses, intrarenal calcifications or significant hydronephrosis are appreciated.  Duplex imaging demonstrates adequate antegrade flow and waveforms within both renal arteries and both renal veins.  The urinary bladder is poorly distended and difficult to evaluate with no gross abnormalities appreciated.     1. Benign-appearing, right cortical cysts are present as described above. Electronically signed by: Jamaal Marie Date:    06/17/2024 Time:    14:06    X-Ray Tibia Fibula 2 View Left    Result Date: 6/15/2024  EXAMINATION: XR TIBIA FIBULA 2 VIEW LEFT CLINICAL HISTORY: rule out osteo; TECHNIQUE: AP and lateral views of the left tibia and fibula were performed. COMPARISON: None. FINDINGS: There are no fractures seen.  There is no dislocation.  There is significant degenerative changes in the knee.  There is no evidence of osteomyelitis. "     Degenerative changes in the knee, no fractures are seen. Electronically signed by: Elfego Adler MD Date:    06/15/2024 Time:    10:10    X-Ray Chest AP Portable    Result Date: 6/14/2024  EXAMINATION: XR CHEST AP PORTABLE CLINICAL HISTORY: Shortness of breath TECHNIQUE: Single frontal view of the chest was performed. COMPARISON: 02/09/2022 FINDINGS: Lungs are clear.  Heart size is within normal limits.  Costophrenic angles are clear.  There is vascular calcification noted.     No acute disease is seen Electronically signed by: Elfego Adler MD Date:    06/14/2024 Time:    18:51    CTA Chest Non-Coronary (PE Studies)    Result Date: 6/14/2024  EXAMINATION: CTA CHEST NON CORONARY (PE STUDIES) CLINICAL HISTORY: Pulmonary embolism (PE) suspected, positive D-dimer; TECHNIQUE: Low dose axial images, sagittal and coronal reformations were obtained from the thoracic inlet to the lung bases following the IV administration of 100 mL of Omnipaque 350.  Contrast timing was optimized to evaluate the pulmonary arteries.  MIP images were performed. Total DLP: 444 mGy.cm Automatic exposure control was utilized to reduce the patient's dose COMPARISON: Chest x-ray dated February 9, 2022 and CT dated March 21, 2014. FINDINGS: No evidence of pulmonary embolism is identified on either side. Elsewhere in the chest, the visualized portions of the thyroid lobes appear unremarkable.  No supraclavicular, axillary, mediastinal or hilar adenopathy identified.  The aorta appears normal in caliber.  The heart size is within normal limits.  No pericardial effusion is seen. The lung fields appear clear.  No pleural effusion or pneumothorax are noted. The visualized portions of the upper abdomen demonstrate fatty infiltration of the liver.  A large hepatic cyst is again seen in the right lobe, similar to that noted on prior CT.  The adrenal glands are incompletely included on this examination but appear grossly normal. Review of the bony  structures demonstrates no acute abnormalities.  Degenerative changes are seen.  Remote compression fracture 1 the midthoracic vertebral bodies is again noted.     No evidence of acute pulmonary embolism is identified. Fatty infiltration is noted in the liver. Large hepatic cyst is again seen. Remote mild wedge compression fracture of a midthoracic vertebral body is noted. Electronically signed by: Td Oliveira Date:    06/14/2024 Time:    17:37    US Lower Extremity Veins Left    Result Date: 6/14/2024  EXAMINATION: US LOWER EXTREMITY VEINS LEFT CLINICAL HISTORY: Edema, unspecified TECHNIQUE: Duplex and color flow Doppler evaluation and graded compression of the left lower extremity veins was performed. COMPARISON: December 6, 2021 FINDINGS: Left thigh veins: The common femoral, femoral, popliteal, upper greater saphenous, and deep femoral veins are patent and free of thrombus. The veins are normally compressible and have normal phasic flow and augmentation response. Left calf veins: The visualized calf veins are patent. Miscellaneous: None     No evidence of deep venous thrombosis in the left lower extremity. Electronically signed by: Td Oliveira Date:    06/14/2024 Time:    16:54      N/A     Patient Screened for food insecurity, housing instability, transportation needs, utility difficulties, and interpersonal safety.  No needs identified    Discharge time: 33 minutes         Adrian Milian MD  [facility]

## 2024-06-18 NOTE — PLAN OF CARE
06/18/24 1422   Discharge Assessment   Assessment Type Discharge Planning Assessment   Confirmed/corrected address, phone number and insurance Yes   Confirmed Demographics Correct on Facesheet   Source of Information patient;family   When was your last doctors appointment?   (Adán Unger III)   Reason For Admission Severe sepsis   People in Home spouse   Facility Arrived From: [facility]   Do you expect to return to your current living situation? Yes   Do you have help at home or someone to help you manage your care at home? Yes   Who are your caregiver(s) and their phone number(s)? Richar,Vanessa (Spouse)  [phone]   Prior to hospitilization cognitive status: Alert/Oriented   Current cognitive status: Alert/Oriented   Walking or Climbing Stairs Difficulty no   Dressing/Bathing Difficulty no   Equipment Currently Used at Home CPAP;nebulizer   Readmission within 30 days? No   Patient currently being followed by outpatient case management? No   Do you currently have service(s) that help you manage your care at home? No   Do you take prescription medications? Yes   Do you have prescription coverage? Yes   Coverage [employer]   Do you have any problems affording any of your prescribed medications? No   Is the patient taking medications as prescribed? yes   Who is going to help you get home at discharge? Spouse   How do you get to doctors appointments? car, drives self   Are you on dialysis? No   Discharge Plan A Home with family   DME Needed Upon Discharge  none   Discharge Plan discussed with: Patient;Spouse/sig other   Name(s) and Number(s) SandstoneVanessa dailey (Spouse)  [phone]   Transition of Care Barriers None   OTHER   Name(s) of People in Home SandstoneVanessa dailey (Spouse)  [phone]     Pt  with 3 children; Resides with spouseVanessa; Independent with ADL's; Receives SS & jail benefits; CM to follow for d/c planning needs.

## 2024-06-18 NOTE — PLAN OF CARE
06/18/24 1025   Final Note   Assessment Type Final Discharge Note   Anticipated Discharge Disposition [facility]  (Transferred to [facility])   What phone number can be called within the next 1-3 days to see how you are doing after discharge? [phone]   Post-Acute Status   Coverage Medicare   Discharge Delays None known at this time

## 2024-06-18 NOTE — H&P
History and Physical     Date of Admit: 6/18/2024  Current Hospital Day: 1     Subjective:      Brief HPI:  Reid Mcqueen is a 68 y.o. male who  has a past medical history of A-fib, Acute renal failure, Allergy, Asthma, BPH (benign prostatic hyperplasia), COPD (chronic obstructive pulmonary disease), HLD (hyperlipidemia), Hypokalemia, Kidney stone, Osteoarthritis.  He  was transferred from outside facility and [place] here for nephro services and higher level of care.  He initially presented 6/14 with left lower extremity leg swelling, redness, pain and dyspnea with generalized weakness.  This has been going on for roughly the week prior, although has been gradually worsening.  He did endorse having some nausea and 1 episode of vomiting about a week ago.  He was being treated for sepsis secondary to his lower extremity cellulitis and started on vancomycin along with Zosyn and Cleocin.  That facility  also restarted his home lisinopril despite patient having an elevated creatinine at 1.69 (unknown baseline per my records). A DVT ultrasound and a CT PE scan was done both were negative on 6/14.  He had blood cultures x2 drawn which are no growth to date.  Two days after the contrasted CT scan was done BUN/creatinine began up trending 34/2.6 -> 51/4.88 to today at  62/5.15.     Also noted patient had a minimally elevated troponin at 0.049 in an elevated pro BNP in the 3000s.  Troponin was not trended at that facility.  Patient denies any prior history of myocardial infarction, congestive heart failure.  Denies any chest pain or shortness for breath at this time.  He is comfortable on room air.          Past Medical History:   Diagnosis Date    A-fib     Acute renal failure 06/17/2024    Allergy     Asthma     BPH (benign prostatic hyperplasia)     COPD (chronic obstructive pulmonary disease)     HLD (hyperlipidemia)     Hypokalemia 06/16/2024    Kidney stone     Osteoarthritis     Osteoporosis        Past Surgical  History:   Procedure Laterality Date    CATARACT EXTRACTION, BILATERAL         Review of patient's allergies indicates:   Allergen Reactions    Cardizem [diltiazem hcl] Other (See Comments)       Current Outpatient Medications   Medication Instructions    acetaminophen (TYLENOL) 650 mg, Oral, Every 8 hours    albuterol (PROVENTIL/VENTOLIN HFA) 90 mcg/actuation inhaler 2 puffs, Inhalation, Every 6 hours PRN, Rescue    atorvastatin (LIPITOR) 40 mg, Oral, Nightly    BREO ELLIPTA 200-25 mcg/dose DsDv diskus inhaler 1 puff, Inhalation, Daily    fexofenadine (ALLEGRA) 180 mg, Oral, Daily    lisinopriL 10 mg, Oral, Daily    metoprolol succinate (TOPROL-XL) 50 mg, Oral, Daily    tamsulosin (FLOMAX) 0.4 mg Cap 1 capsule, Oral, Nightly    torsemide (DEMADEX) 20 mg, Oral, Daily    warfarin (COUMADIN) 2.5 mg, Oral, Daily        Family History    None         Tobacco Use    Smoking status: Never    Smokeless tobacco: Not on file   Substance and Sexual Activity    Alcohol use: No    Drug use: No    Sexual activity: Not Currently        Review of Systems:  Review of Systems   Constitutional:  Negative for chills and fever.   Respiratory:  Negative for cough, shortness of breath and wheezing.    Cardiovascular:  Positive for leg swelling. Negative for chest pain and palpitations.   Gastrointestinal:  Positive for nausea. Negative for abdominal pain, diarrhea and vomiting.   Genitourinary:  Negative for flank pain and hematuria.   Skin:  Negative for itching and rash.        Painful red rash LLE   Neurological:  Negative for weakness and headaches.   Endo/Heme/Allergies:  Does not bruise/bleed easily.          Objective:     Vital Signs:  Vital Signs (Most Recent):  Temp: 98.1 °F (36.7 °C) (06/18/24 1248)  Pulse: 69 (06/18/24 1248)  Resp: 16 (06/18/24 1248)  BP: 111/66 (06/18/24 1248)  SpO2: 96 % (06/18/24 1248) Vital Signs (24h Range):  Temp:  [97.4 °F (36.3 °C)-99.4 °F (37.4 °C)] 98.1 °F (36.7 °C)  Pulse:  [69-92] 69  Resp:   "[16-20] 16  SpO2:  [95 %-98 %] 96 %  BP: (107-141)/(61-86) 111/66   There is no height or weight on file to calculate BMI.   No intake or output data in the 24 hours ending 06/18/24 1323    Physical Examination:  General:  Well developed, well nourished, no acute distress  HEENT: NCAT. PERRL, MMM.  Neck: supple, normal ROM, no JVD  CVS:  RRR. S1 and S2 normal. No murmurs, rubs, or gallops. Regular peripheral pulses. No peripheral edema  Resp:  Lungs clear to auscultation bilaterally, no wheezes, rales, or rhonchi. Normal respiratory effort.  GI:  Abdomen soft, non-tender, non-distended, normoactive bowel sounds  MSK:  Full range of motion, no obvious deformities.   Skin:  LLE with significant erythema and edema. Wife states the redness is improved since initially admitted in [place]. There are some blisters forming although no actively weeping wounds. There is some extension to posterior thigh as well.   Neuro:  Alert and oriented x3, No focal neuro deficits, CNII-XII grossly intact            Laboratory:    Recent Labs   Lab 06/18/24  0414   WBC 11.60*   HGB 12.4*   HCT 37.1      MCV 94.2   RDW 14.0     No results for input(s): "TROPONINI", "CKTOTAL", "CKMB", "BNP" in the last 24 hours.  Recent Labs   Lab 06/14/24  1555   TROPONINI 0.049*     No results for input(s): "CHOL", "HDL", "LDLCALC", "TRIG", "CHOLHDL" in the last 168 hours. Recent Labs   Lab 06/18/24  0414   *   K 4.0      CO2 22   BUN 62*   CREATININE 5.15*   CALCIUM 8.4   MG 2.50*     Recent Labs   Lab 06/18/24  0414   ALBUMIN 3.3*   BILITOT 1.0   AST 51   ALKPHOS 43*   ALT 34     No results for input(s): "IRON", "TIBC", "FERRITIN", "SATURATEDIRO", "TAZXQSUZ84", "FOLATE" in the last 168 hours.  Recent Labs   Lab 06/18/24  0414   INR 1.8          Microbiology Data:  Microbiology Results (last 7 days)       ** No results found for the last 168 hours. **             Other Results:    Radiology:    US Kidney    Result Date: 6/17/2024  1. " Benign-appearing, right cortical cysts are present as described above. Electronically signed by: Jamaal Marie Date:    06/17/2024 Time:    14:06       Current Medications:     Infusions:        Scheduled:   heparin (porcine)  5,000 Units Subcutaneous Q12H    mupirocin   Nasal BID         PRN:   heparin (porcine) injection 5,000 Units    mupirocin 2 % ointment        Antibiotics and Day Number of Therapy:  Antibiotics (From admission, onward)      Start     Stop Route Frequency Ordered    06/18/24 2100  mupirocin 2 % ointment         06/23/24 2059 Nasl 2 times daily 06/18/24 1320                 Assessment & Plan:     Non-oliguric Acute renal failure  - patient was transferred here for Renal Services and nephrology has been consulted.  - I suspect there is some iatrogenic component at least for this patients acute renal failure. The previous facility restarted his home lisinopril and diuresed with torsemide despite patient having an elevated creatinine at 1.69 (unknown baseline per my records). A DVT ultrasound and a CT PE scan was done both were negative on 6/14.    - Two days after the contrasted CT scan was done BUN/creatinine began up trending 34/2.6 -> 51/4.88 to today at  62/5.15.   - urine studies ordered.   - Starting IVF    LLE cellulitis  Received vanc/zosyn during stay at outside facility 6-14 thru today. Cleocin was also started.   Will de-escalate to rocephin given negative blood cultures at outside facility and patient does not meet sepsis criteria. Vitals are normal and stable. WBC is wnl.   Wound care consult placed      pAF  Continue home warfarin. On warfarin 2/2 affordability.   PT/INR 18/1.4    Elevated pro- bnp 3000  No echo on file will get one.     Wife is reporting to me the patient has a previous history of congestive heart failure.  He used to be on Entresto and Eliquis although with the had to be discontinued secondary to affordability.  He was previously on either Aldactone or Jardiance per  his wife although she does not know why they have been discontinued. Wife did also report to me that patient had a non-ischemic heart cath in dec of either 2020 or 2021.   CM consult for records from [place].              10cm hepatic lobe cyst noted on scans.      CODE STATUS: full  Access: piv  Antibiotics: rocephin 2g q24h  Diet: renal  DVT Prophylaxis: heparin  GI Prophylaxis: prn       Disposition: stable.  Transferred from outside facility for acute renal failure and nephro services.  Nephrology consulted.  Deescalating previous antibiotic regimen to Rocephin.          Kevin Campa,   Internal Medicine Resident PGY-II      Attending addendum to follow

## 2024-06-18 NOTE — CONSULTS
[facility]  Nephrology Consultation    Patient Name: Reid Mcqueen  Age: 68 y.o.  : 1955  MRN: [id number]  Admission Date: 2024    Date of Consultation: 2024    Consultation Requested By: Dr Campa    Reason for Consultation:  Acute kidney injury    Chief Complaint:  Left leg redness and swelling    History of Present Illness:  Mr Reid Mcqueen is a 68 y.o. White male with past medical history of hypertension, atrial fibrillation, COPD, BPH, dyslipidemia, osteoarthritis, and obesity.  Patient was brought to an outside hospital for treatment of left lower extremity cellulitis 2024.  Patient's family member noticed that patient was not himself on 2024.  When she came to check on him she noticed redness and swelling of his left lower extremity and some confusion.  Patient also had associated diarrhea.  He could not recall when the rash started or what precipitated his symptoms.  There were no alleviating symptoms.  Patient was treated with vancomycin and piperacillin-tazobactam at an outside facility, however, he developed supratherapeutic vancomycin level and subsequent acute kidney injury.  He was transferred to [facility] for higher level of care and nephrology services.  Patient was admitted to internal medicine service, Nephrology was consulted for assistance with management of acute kidney injury.    Patient is allergic to cardizem [diltiazem hcl].     Review of Systems   Constitutional:  Positive for activity change.   HENT: Negative.     Eyes: Negative.    Respiratory: Negative.     Cardiovascular:  Positive for leg swelling.   Gastrointestinal:  Positive for diarrhea.   Endocrine: Negative.    Genitourinary: Negative.    Musculoskeletal:  Negative for arthralgias.        Swelling and redness of left lower extremity   Skin:         Large area of erythema to left lower extremity   Allergic/Immunologic: Negative.   "  Neurological:  Positive for speech difficulty.   Hematological: Negative.    Psychiatric/Behavioral:  Positive for agitation.        Past Medical History:  has a past medical history of A-fib, Acute renal failure, Allergy, Asthma, BPH (benign prostatic hyperplasia), COPD (chronic obstructive pulmonary disease), HLD (hyperlipidemia), Hypokalemia, Kidney stone, Osteoarthritis, and Osteoporosis.    Procedure History:  has a past surgical history that includes Cataract extraction, bilateral.    Family History: family history is not on file.    Social History:  reports that he has never smoked. He does not have any smokeless tobacco history on file. He reports that he does not drink alcohol and does not use drugs.    Physical Exam:   /66   Pulse 65   Temp 98.1 °F (36.7 °C)   Resp 16   Ht 5' 7" (1.702 m)   Wt 115.7 kg (255 lb)   SpO2 95%   BMI 39.94 kg/m²  Body mass index is 39.94 kg/m².  General appearance: Patient is in no acute distress.  Skin:  Left lower extremity is edematous and erythematous  HEENT: PERRLA, EOMI, no scleral icterus, no JVD. Neck is supple.  Chest: Respirations are unlabored. Lungs sounds are clear.   Heart: S1, S2.   Abdomen: Benign.  Obese  : Deferred.  Extremities: No edema to right lower extremity, left lower extremity is edematous, peripheral pulses are palpable.   Neuro: No focal deficits.      Inpatient Medications:   Scheduled Meds:   [START ON 6/19/2024] atorvastatin  40 mg Oral QHS    cefTRIAXone (Rocephin) IV (PEDS and ADULTS)  2 g Intravenous Q24H    fluticasone furoate-vilanteroL  1 puff Inhalation Daily    metoprolol succinate  50 mg Oral Daily    mupirocin   Nasal BID    sodium chloride 0.9%  1,000 mL Intravenous Once    warfarin  2.5 mg Oral Daily     Continuous Infusions:   sodium chloride 0.9%   Intravenous Continuous         PRN Meds:.  Current Facility-Administered Medications:     acetaminophen, 650 mg, Oral, Q4H PRN    acetaminophen, 650 mg, Oral, Q8H PRN    " albuterol, 2 puff, Inhalation, Q6H PRN    aluminum-magnesium hydroxide-simethicone, 30 mL, Oral, QID PRN    dextrose 10%, 12.5 g, Intravenous, PRN    dextrose 10%, 25 g, Intravenous, PRN    glucagon (human recombinant), 1 mg, Intramuscular, PRN    glucose, 16 g, Oral, PRN    glucose, 24 g, Oral, PRN    naloxone, 0.02 mg, Intravenous, PRN    sodium chloride 0.9%, 10 mL, Intravenous, Q12H PRN     Imaging:  Kidney ultrasound 06/17/2024:  Right kidney measures 12.5 cm in length, left kidney measures 11.5 cm in length.  Multiple benign appearing cortical cysts are present.  No masses, calcifications, or hydronephrosis appreciated.    Laboratory Data:   Serum  Lab Results   Component Value Date    WBC 11.60 (H) 06/18/2024    HGB 12.4 (L) 06/18/2024    HCT 37.1 06/18/2024     06/18/2024     (L) 06/18/2024    K 4.0 06/18/2024    CO2 22 06/18/2024    BUN 62 (H) 06/18/2024    CREATININE 5.15 (H) 06/18/2024    EGFRNORACEVR 11 06/18/2024    GLUCOSE 105 06/18/2024    CALCIUM 8.4 06/18/2024    ALKPHOS 43 (L) 06/18/2024    LABPROT 18.0 (H) 06/18/2024    LABPROT 7.2 06/18/2024    ALBUMIN 3.3 (L) 06/18/2024    AST 51 06/18/2024    ALT 34 06/18/2024    MG 2.50 (H) 06/18/2024        Urine:  Lab Results   Component Value Date    APPEARANCEUA Clear 06/14/2024    SGUA 1.025 06/14/2024    PROTEINUA 30 (A) 06/14/2024    KETONESUA Negative 06/14/2024    LEUKOCYTESUR Negative 06/14/2024    RBCUA 0-5 06/14/2024    WBCUA 0-5 06/14/2024    BACTERIA Few (A) 06/14/2024    HYALINECASTS Moderate (A) 06/14/2024      Microbiology Results (last 7 days)       ** No results found for the last 168 hours. **             Impression:   Left lower extremity cellulitis  Nonoliguric acute kidney injury  History of paroxysmal atrial fibrillation, questionable history of heart failure, BPH, hypertension, and morbid obesity     Plan:   Etiology of acute kidney injury is multifactorial:  Intravascular volume depletion, likely vancomycin-related  ATN/AIN.  Vancomycin has been discontinued.  Kidney ultrasound revealed no structural abnormalities of kidneys or urinary tract.  Diuretics and NELY blockers has been discontinued as well.  Will continue normal saline at 100 mL/hr, urine protein to creatinine ratio and urinary electrolytes have been ordered, results are pending.  Will check hemoglobin A1c and screen for CKD-MBD.    There are no indications for hemodialysis at present.  Will continue to monitor closely along with primary service.      Thank you very much for your consultation.     Kimber Ivory NP  [facility] Nephrology  6/18/2024 4:07 PM

## 2024-06-19 PROBLEM — E46 MALNUTRITION: Status: ACTIVE | Noted: 2024-06-19

## 2024-06-19 LAB
25(OH)D3+25(OH)D2 SERPL-MCNC: 17 NG/ML (ref 30–80)
ALBUMIN SERPL-MCNC: 2.5 G/DL (ref 3.4–4.8)
ALBUMIN/GLOB SERPL: 0.6 RATIO (ref 1.1–2)
ALP SERPL-CCNC: 53 UNIT/L (ref 40–150)
ALT SERPL-CCNC: 24 UNIT/L (ref 0–55)
ANION GAP SERPL CALC-SCNC: 12 MEQ/L
AST SERPL-CCNC: 28 UNIT/L (ref 5–34)
BACTERIA BLD CULT: NORMAL
BASOPHILS # BLD AUTO: 0.04 X10(3)/MCL
BASOPHILS NFR BLD AUTO: 0.3 %
BILIRUB SERPL-MCNC: 0.5 MG/DL
BUN SERPL-MCNC: 55.2 MG/DL (ref 8.4–25.7)
CALCIUM SERPL-MCNC: 9.2 MG/DL (ref 8.8–10)
CHLORIDE SERPL-SCNC: 108 MMOL/L (ref 98–107)
CO2 SERPL-SCNC: 21 MMOL/L (ref 23–31)
CREAT SERPL-MCNC: 4.46 MG/DL (ref 0.73–1.18)
CREAT/UREA NIT SERPL: 12
EOSINOPHIL # BLD AUTO: 0 X10(3)/MCL (ref 0–0.9)
EOSINOPHIL NFR BLD AUTO: 0 %
ERYTHROCYTE [DISTWIDTH] IN BLOOD BY AUTOMATED COUNT: 14 % (ref 11.5–17)
EST. AVERAGE GLUCOSE BLD GHB EST-MCNC: 131.2 MG/DL
GFR SERPLBLD CREATININE-BSD FMLA CKD-EPI: 14 ML/MIN/1.73/M2
GLOBULIN SER-MCNC: 4.5 GM/DL (ref 2.4–3.5)
GLUCOSE SERPL-MCNC: 104 MG/DL (ref 82–115)
HBA1C MFR BLD: 6.2 %
HCT VFR BLD AUTO: 38.5 % (ref 42–52)
HGB BLD-MCNC: 12.3 G/DL (ref 14–18)
IMM GRANULOCYTES # BLD AUTO: 0.32 X10(3)/MCL (ref 0–0.04)
IMM GRANULOCYTES NFR BLD AUTO: 2.1 %
INR PPP: 2
LYMPHOCYTES # BLD AUTO: 1.38 X10(3)/MCL (ref 0.6–4.6)
LYMPHOCYTES NFR BLD AUTO: 9.2 %
MAGNESIUM SERPL-MCNC: 2.5 MG/DL (ref 1.6–2.6)
MCH RBC QN AUTO: 30.8 PG (ref 27–31)
MCHC RBC AUTO-ENTMCNC: 31.9 G/DL (ref 33–36)
MCV RBC AUTO: 96.5 FL (ref 80–94)
MONOCYTES # BLD AUTO: 1.12 X10(3)/MCL (ref 0.1–1.3)
MONOCYTES NFR BLD AUTO: 7.5 %
NEUTROPHILS # BLD AUTO: 12.08 X10(3)/MCL (ref 2.1–9.2)
NEUTROPHILS NFR BLD AUTO: 80.9 %
NRBC BLD AUTO-RTO: 0 %
PHOSPHATE SERPL-MCNC: 4.5 MG/DL (ref 2.3–4.7)
PLATELET # BLD AUTO: 229 X10(3)/MCL (ref 130–400)
PMV BLD AUTO: 9.2 FL (ref 7.4–10.4)
POTASSIUM SERPL-SCNC: 4 MMOL/L (ref 3.5–5.1)
PROT SERPL-MCNC: 7 GM/DL (ref 5.8–7.6)
PROTHROMBIN TIME: 22.3 SECONDS (ref 11.4–14)
PTH-INTACT SERPL-MCNC: 144.6 PG/ML (ref 8.7–77)
RBC # BLD AUTO: 3.99 X10(6)/MCL (ref 4.7–6.1)
SODIUM SERPL-SCNC: 141 MMOL/L (ref 136–145)
WBC # BLD AUTO: 14.94 X10(3)/MCL (ref 4.5–11.5)

## 2024-06-19 PROCEDURE — 94640 AIRWAY INHALATION TREATMENT: CPT

## 2024-06-19 PROCEDURE — 85025 COMPLETE CBC W/AUTO DIFF WBC: CPT

## 2024-06-19 PROCEDURE — 99900035 HC TECH TIME PER 15 MIN (STAT)

## 2024-06-19 PROCEDURE — 82306 VITAMIN D 25 HYDROXY: CPT | Performed by: NURSE PRACTITIONER

## 2024-06-19 PROCEDURE — 83036 HEMOGLOBIN GLYCOSYLATED A1C: CPT | Performed by: NURSE PRACTITIONER

## 2024-06-19 PROCEDURE — 80053 COMPREHEN METABOLIC PANEL: CPT

## 2024-06-19 PROCEDURE — 85610 PROTHROMBIN TIME: CPT

## 2024-06-19 PROCEDURE — 84100 ASSAY OF PHOSPHORUS: CPT

## 2024-06-19 PROCEDURE — 83970 ASSAY OF PARATHORMONE: CPT | Performed by: NURSE PRACTITIONER

## 2024-06-19 PROCEDURE — 36415 COLL VENOUS BLD VENIPUNCTURE: CPT

## 2024-06-19 PROCEDURE — 63600175 PHARM REV CODE 636 W HCPCS

## 2024-06-19 PROCEDURE — 83735 ASSAY OF MAGNESIUM: CPT

## 2024-06-19 PROCEDURE — 25000003 PHARM REV CODE 250: Performed by: INTERNAL MEDICINE

## 2024-06-19 PROCEDURE — 25000003 PHARM REV CODE 250: Performed by: NURSE PRACTITIONER

## 2024-06-19 PROCEDURE — 25000003 PHARM REV CODE 250

## 2024-06-19 PROCEDURE — 21400001 HC TELEMETRY ROOM

## 2024-06-19 RX ADMIN — DOXYCYCLINE 100 MG: 100 INJECTION, POWDER, LYOPHILIZED, FOR SOLUTION INTRAVENOUS at 10:06

## 2024-06-19 RX ADMIN — MUPIROCIN: 20 OINTMENT TOPICAL at 09:06

## 2024-06-19 RX ADMIN — FLUTICASONE FUROATE AND VILANTEROL TRIFENATATE 1 PUFF: 200; 25 POWDER RESPIRATORY (INHALATION) at 07:06

## 2024-06-19 RX ADMIN — SODIUM CHLORIDE: 9 INJECTION, SOLUTION INTRAVENOUS at 06:06

## 2024-06-19 RX ADMIN — ATORVASTATIN CALCIUM 40 MG: 40 TABLET, FILM COATED ORAL at 09:06

## 2024-06-19 RX ADMIN — DOXYCYCLINE 100 MG: 100 INJECTION, POWDER, LYOPHILIZED, FOR SOLUTION INTRAVENOUS at 11:06

## 2024-06-19 RX ADMIN — WARFARIN SODIUM 2.5 MG: 2.5 TABLET ORAL at 04:06

## 2024-06-19 RX ADMIN — METOPROLOL SUCCINATE 50 MG: 50 TABLET, FILM COATED, EXTENDED RELEASE ORAL at 09:06

## 2024-06-19 RX ADMIN — CEFTRIAXONE SODIUM 2 G: 2 INJECTION, POWDER, FOR SOLUTION INTRAMUSCULAR; INTRAVENOUS at 01:06

## 2024-06-19 RX ADMIN — ACETAMINOPHEN 650 MG: 325 TABLET, FILM COATED ORAL at 09:06

## 2024-06-19 NOTE — PROGRESS NOTES
Patient is seen at bedside for evaluation of Left lower extremity cellulitis. Left lower extremity is noted with multiple scattered fluid filled blisters and edema. No open aspects or drainage note. Left lower extremity is noted to be warm to touch with erythema. Pt and family member stated that the erythema has improved since starting antibiotics. Right lower extremity has no discoloration. Cleansed BLE with CHG wipes. Wound care to follow up tomorrow.

## 2024-06-19 NOTE — ASSESSMENT & PLAN NOTE
Patient meets ASPEN criteria for moderate malnutrition of acute illness or injury per RD assessment as evidenced by:  Energy Intake (Malnutrition): less than or equal to 50% for greater than or equal to 5 days  Weight Loss (Malnutrition):  (does not meet criterai)        Fluid Accumulation (Malnutrition): moderate        A minimum of two characteristics is recommended for diagnosis of either severe or non-severe malnutrition.

## 2024-06-19 NOTE — PROGRESS NOTES
[facility] IM Progress Note    Date of Admit: 6/18/2024  Current Hospital Day: 2     Subjective:      Brief HPI:  Reid Mcqueen is a 68 y.o. male who  has a past medical history of A-fib, Acute renal failure, Allergy, Asthma, BPH (benign prostatic hyperplasia), COPD (chronic obstructive pulmonary disease), HLD (hyperlipidemia), Hypokalemia, Kidney stone, Osteoarthritis.  He  was transferred from outside facility and [place] here for nephro services and higher level of care.  He initially presented 6/14 with left lower extremity leg swelling, redness, pain and dyspnea with generalized weakness.  This has been going on for roughly the week prior, although has been gradually worsening.  He did endorse having some nausea and 1 episode of vomiting about a week ago.  He was being treated for sepsis secondary to his lower extremity cellulitis and started on vancomycin along with Zosyn and Cleocin.  That facility  also restarted his home lisinopril despite patient having an elevated creatinine at 1.69 (unknown baseline per my records). A DVT ultrasound and a CT PE scan was done both were negative on 6/14.  He had blood cultures x2 drawn which are no growth to date.  Two days after the contrasted CT scan was done BUN/creatinine began up trending 34/2.6 -> 51/4.88 to today at  62/5.15.     Also noted patient had a minimally elevated troponin at 0.049 in an elevated pro BNP in the 3000s.  Troponin was not trended at that facility.  Patient denies any prior history of myocardial infarction, congestive heart failure.  Denies any chest pain or shortness for breath at this time.  He is comfortable on room air.    Interval hx:  No acute events overnight. No urine output documented in I/O which would be helpful given pt here for renal failure. Cellulitis redness and pain improving. Leg still swollen.  Adding doxycycline today given increase in white blood cell count to roughly 15.      Past Medical History:   Diagnosis Date    A-fib      Acute renal failure 06/17/2024    Allergy     Asthma     BPH (benign prostatic hyperplasia)     COPD (chronic obstructive pulmonary disease)     HLD (hyperlipidemia)     Hypokalemia 06/16/2024    Kidney stone     Osteoarthritis     Osteoporosis        Past Surgical History:   Procedure Laterality Date    CATARACT EXTRACTION, BILATERAL         Review of patient's allergies indicates:   Allergen Reactions    Cardizem [diltiazem hcl] Other (See Comments)       Current Outpatient Medications   Medication Instructions    acetaminophen (TYLENOL) 650 mg, Oral, Every 8 hours    albuterol (PROVENTIL/VENTOLIN HFA) 90 mcg/actuation inhaler 2 puffs, Inhalation, Every 6 hours PRN, Rescue    atorvastatin (LIPITOR) 40 mg, Oral, Nightly    BREO ELLIPTA 200-25 mcg/dose DsDv diskus inhaler 1 puff, Inhalation, Daily    fexofenadine (ALLEGRA) 180 mg, Oral, Daily    lisinopriL 10 mg, Oral, Daily    metoprolol succinate (TOPROL-XL) 50 mg, Oral, Daily    tamsulosin (FLOMAX) 0.4 mg Cap 1 capsule, Oral, Nightly    torsemide (DEMADEX) 20 mg, Oral, Daily    warfarin (COUMADIN) 2.5 mg, Oral, Daily        Family History    None         Tobacco Use    Smoking status: Never    Smokeless tobacco: Not on file   Substance and Sexual Activity    Alcohol use: No    Drug use: No    Sexual activity: Not Currently        Review of Systems:  Review of Systems   Constitutional:  Negative for chills and fever.   Respiratory:  Negative for cough, shortness of breath and wheezing.    Cardiovascular:  Positive for leg swelling. Negative for chest pain and palpitations.   Gastrointestinal:  Positive for nausea. Negative for abdominal pain, diarrhea and vomiting.   Genitourinary:  Negative for flank pain and hematuria.   Skin:  Negative for itching and rash.        Painful red rash LLE   Neurological:  Negative for weakness and headaches.   Endo/Heme/Allergies:  Does not bruise/bleed easily.          Objective:     Vital Signs:  Vital Signs (Most  "Recent):  Temp: 98 °F (36.7 °C) (06/19/24 1200)  Pulse: 63 (06/19/24 1200)  Resp: (!) 26 (06/19/24 1200)  BP: (!) 152/85 (06/19/24 1200)  SpO2: 95 % (06/19/24 1200) Vital Signs (24h Range):  Temp:  [97.7 °F (36.5 °C)-98.8 °F (37.1 °C)] 98 °F (36.7 °C)  Pulse:  [63-72] 63  Resp:  [16-26] 26  SpO2:  [94 %-97 %] 95 %  BP: (111-154)/(66-87) 152/85   Body mass index is 39.94 kg/m².     Intake/Output Summary (Last 24 hours) at 6/19/2024 1201  Last data filed at 6/19/2024 0955  Gross per 24 hour   Intake 2082.91 ml   Output --   Net 2082.91 ml       Physical Examination:  General:  Well developed, well nourished, no acute distress  HEENT: NCAT. PERRL, MMM.  Neck: supple, normal ROM, no JVD  CVS:  RRR. S1 and S2 normal. No murmurs, rubs, or gallops. Regular peripheral pulses. No peripheral edema  Resp:  Lungs clear to auscultation bilaterally, no wheezes, rales, or rhonchi. Normal respiratory effort.  GI:  Abdomen soft, non-tender, non-distended, normoactive bowel sounds  MSK:  Full range of motion, no obvious deformities.   Skin:  LLE with erythema and edema. Wife states the redness is improved since initially admitted in [place]. There are some blisters forming although no actively weeping wounds. There is some extension of erythema posterior thigh as well. Today cellulitis is improving, redness and pain decreased.   Neuro:  Alert and oriented x3, No focal neuro deficits, CNII-XII grossly intact            Laboratory:    Recent Labs   Lab 06/19/24  0404   WBC 14.94*   HGB 12.3*   HCT 38.5*      MCV 96.5*   RDW 14.0     No results for input(s): "TROPONINI", "CKTOTAL", "CKMB", "BNP" in the last 24 hours.  Recent Labs   Lab 06/14/24  1555   TROPONINI 0.049*     No results for input(s): "CHOL", "HDL", "LDLCALC", "TRIG", "CHOLHDL" in the last 168 hours. Recent Labs   Lab 06/19/24  0404      K 4.0   *   CO2 21*   BUN 55.2*   CREATININE 4.46*   CALCIUM 9.2   MG 2.50   PHOS 4.5     Recent Labs   Lab " "06/19/24  0404   ALBUMIN 2.5*   BILITOT 0.5   AST 28   ALKPHOS 53   ALT 24     No results for input(s): "IRON", "TIBC", "FERRITIN", "SATURATEDIRO", "[id number]", "FOLATE" in the last 168 hours.  Recent Labs   Lab 06/19/24  0404   HGBA1C 6.2   INR 2.0*          Microbiology Data:  Microbiology Results (last 7 days)       ** No results found for the last 168 hours. **             Other Results:    Radiology:    US Kidney    Result Date: 6/17/2024  1. Benign-appearing, right cortical cysts are present as described above. Electronically signed by: Jamaal Marie Date:    06/17/2024 Time:    14:06       Current Medications:     Infusions:   sodium chloride 0.9%   Intravenous Continuous 100 mL/hr at 06/19/24 0955 Rate Verify at 06/19/24 0955         Scheduled:   atorvastatin  40 mg Oral QHS    cefTRIAXone (Rocephin) IV (PEDS and ADULTS)  2 g Intravenous Q24H    doxycycline IV (PEDS and ADULTS)  100 mg Intravenous Q12H    fluticasone furoate-vilanteroL  1 puff Inhalation Daily    metoprolol succinate  50 mg Oral Daily    mupirocin   Nasal BID    warfarin  2.5 mg Oral Daily         PRN:   atorvastatin tablet 40 mg    cefTRIAXone (ROCEPHIN) 2 g in dextrose 5 % in water (D5W) 100 mL IVPB (MB+)    doxycycline 100 mg in dextrose 5 % in water (D5W) 100 mL IVPB (MB+)    fluticasone furoate-vilanteroL 200-25 mcg/dose diskus inhaler 1 puff    metoprolol succinate (TOPROL-XL) 24 hr tablet 50 mg    mupirocin 2 % ointment    warfarin (COUMADIN) tablet 2.5 mg        Antibiotics and Day Number of Therapy:  Antibiotics (From admission, onward)      Start     Stop Route Frequency Ordered    06/19/24 1100  doxycycline 100 mg in dextrose 5 % in water (D5W) 100 mL IVPB (MB+)         -- IV Every 12 hours (non-standard times) 06/19/24 1006    06/18/24 2100  mupirocin 2 % ointment         06/23/24 2059 Nasl 2 times daily 06/18/24 1320    06/18/24 1400  cefTRIAXone (ROCEPHIN) 2 g in dextrose 5 % in water (D5W) 100 mL IVPB (MB+)         07/02/24 " 1359 IV Every 24 hours (non-standard times) 06/18/24 1348                 Assessment & Plan:     Non-oliguric Acute renal failure  - patient was transferred here for Renal Services and nephrology has been consulted.  - I suspect there is some iatrogenic component at least for this patients acute renal failure. The previous facility restarted his home lisinopril and diuresed with torsemide despite patient having an elevated creatinine at 1.69 (unknown baseline per my records). A DVT ultrasound and a CT PE scan was done both were negative on 6/14.    - Two days after the contrasted CT scan was done BUN/creatinine began up trending 34/2.6 -> 51/4.88 to today at  62/5.15.   - urine studies ordered.   - IVF    LLE cellulitis  Received vanc/zosyn during stay at outside facility 6-14 thru today. Cleocin was also started.   We  de-escalated to rocephin given negative blood cultures at outside facility and patient does not meet sepsis criteria.  Although today there was an elevation in the patient's white blood cell count so we will start doxycycline 100 mg b.i.d. Vitals are normal and stable. WBC is wnl.   Wound care consult placed      pAF  Continue home warfarin. On warfarin 2/2 affordability.   PT/INR 18/1.4    Elevated pro- bnp 3000  No echo on file will get one.     Wife is reporting to me the patient has a previous history of congestive heart failure.  He used to be on Entresto and Eliquis although with the had to be discontinued secondary to affordability.  He was previously on either Aldactone or Jardiance per his wife although she does not know why they have been discontinued. Wife did also report to me that patient had a non-ischemic heart cath in dec of either 2020 or 2021.   CM consult for records from [place].              10cm hepatic lobe cyst noted on scans.      CODE STATUS: full  Access: piv  Antibiotics: rocephin 2g q24h  Diet: renal  DVT Prophylaxis: heparin  GI Prophylaxis: prn       Disposition:  stable.  Transferred from outside facility for acute renal failure and nephro services.           Kevin Campa,   Internal Medicine Resident PGY-II      Attending addendum to follow

## 2024-06-19 NOTE — PROGRESS NOTES
"[facility]  Nephrology Progress Note    Patient Name: Reid Mcqueen  Age: 68 y.o.  : 1955  MRN: [id number]  Admission Date: 2024    Hospital course  Reid Mcqueen is a 68 y.o. White male with past medical history of hypertension, atrial fibrillation, COPD, BPH, dyslipidemia, osteoarthritis, and obesity. Patient was brought to an outside hospital for treatment of left lower extremity cellulitis 2024. Patient's family member noticed that patient was not himself on 2024. When she came to check on him she noticed redness and swelling of his left lower extremity and some confusion. Patient also had associated diarrhea. He could not recall when the rash started or what precipitated his symptoms. There were no alleviating symptoms. Patient was treated with vancomycin and piperacillin-tazobactam at an outside facility, however, he developed supratherapeutic vancomycin level and subsequent acute kidney injury. He was transferred to [facility] for higher level of care and nephrology services. Patient was admitted to internal medicine service, Nephrology was consulted for assistance with management of acute kidney injury.     Subjective  Patient is alert and oriented ambulating around the room.  Tells me he has been voiding every 2 hours.     Review of Systems  Respiratory: Negative.     Cardiovascular:  Positive for leg swelling.     Objective  BP (!) 154/87   Pulse 66   Temp 98.4 °F (36.9 °C) (Oral)   Resp 20   Ht 5' 7" (1.702 m)   Wt 115.7 kg (255 lb)   SpO2 96%   BMI 39.94 kg/m²     Intake/Output Summary (Last 24 hours) at 2024 0623  Last data filed at 2024 1725  Gross per 24 hour   Intake 615.24 ml   Output --   Net 615.24 ml       Physical Exam  General appearance: Patient is in no acute distress.  Skin: No rashes or wounds.  HEENT: PERRLA, EOMI, no scleral icterus, no JVD. Neck is supple.  Chest: Respirations are " unlabored. Lungs sounds are clear.   Heart: S1, S2.   Abdomen: Benign.  : Deferred.  Extremities: No edema, peripheral pulses are palpable.   Neuro: No focal deficits.     Medications  Scheduled Meds:   atorvastatin  40 mg Oral QHS    cefTRIAXone (Rocephin) IV (PEDS and ADULTS)  2 g Intravenous Q24H    fluticasone furoate-vilanteroL  1 puff Inhalation Daily    metoprolol succinate  50 mg Oral Daily    mupirocin   Nasal BID    warfarin  2.5 mg Oral Daily     Continuous Infusions:   sodium chloride 0.9%   Intravenous Continuous 100 mL/hr at 06/18/24 1725 Rate Verify at 06/18/24 1725     PRN Meds:.  Current Facility-Administered Medications:     acetaminophen, 650 mg, Oral, Q4H PRN    acetaminophen, 650 mg, Oral, Q8H PRN    albuterol, 2 puff, Inhalation, Q6H PRN    aluminum-magnesium hydroxide-simethicone, 30 mL, Oral, QID PRN    dextrose 10%, 12.5 g, Intravenous, PRN    dextrose 10%, 25 g, Intravenous, PRN    glucagon (human recombinant), 1 mg, Intramuscular, PRN    glucose, 16 g, Oral, PRN    glucose, 24 g, Oral, PRN    naloxone, 0.02 mg, Intravenous, PRN    sodium chloride 0.9%, 10 mL, Intravenous, Q12H PRN     Imaging:    Reviewed    Laboratory Data:    Chemistry  Recent Labs     06/17/24  0629 06/18/24  0414 06/19/24  0404   * 134* 141   K 3.6 4.0 4.0   CO2 21 22 21*   BUN 51* 62* 55.2*   CREATININE 4.88* 5.15* 4.46*   EGFRNORACEVR 12 11 14   GLUCOSE 110 105 104   CALCIUM 8.2* 8.4 9.2   MG 2.50* 2.50* 2.50   PHOS  --   --  4.5      LFT  Lab Results   Component Value Date    ALKPHOS 53 06/19/2024    AST 28 06/19/2024    ALT 24 06/19/2024    BILITOT 0.5 06/19/2024    ALBUMIN 2.5 (L) 06/19/2024      CKD-MBD  Lab Results   Component Value Date    .6 (H) 06/19/2024    [id number] 17 (L) 06/19/2024      Hematology  Recent Labs     06/17/24  0629 06/18/24  0414 06/19/24  0404   WBC 10.34 11.60* 14.94*   HGB 11.9* 12.4* 12.3*   HCT 36.1 37.1 38.5*    199 229      Additional serology  Lab Results    Component Value Date    HGBA1C 6.2 06/19/2024       Urine studies  Lab Results   Component Value Date    APPEARANCEUA Clear 06/14/2024    SGUA 1.025 06/14/2024    PROTEINUA 30 (A) 06/14/2024    KETONESUA Negative 06/14/2024    LEUKOCYTESUR Negative 06/14/2024    RBCUA 0-5 06/14/2024    WBCUA 0-5 06/14/2024    BACTERIA Few (A) 06/14/2024    HYALINECASTS Moderate (A) 06/14/2024    CREATRANDUR 49.7 (L) 06/18/2024    CREATRANDUR 49.6 (L) 06/18/2024    PROTEINURINE 12.1 06/18/2024        Impression  Left lower extremity cellulitis  Nonoliguric acute kidney injury  History of paroxysmal atrial fibrillation, questionable history of heart failure, BPH, hypertension, and morbid obesity     Plan  Renal indices are improving.  Unfortunately, urinary output has been documented.  Will order accurate I&O monitoring.  Continue IV fluids at current rate.     Kimber Ivory NP  [facility]ph[facility]   6/19/2024

## 2024-06-20 LAB
ALBUMIN SERPL-MCNC: 2.4 G/DL (ref 3.4–4.8)
ALBUMIN/GLOB SERPL: 0.5 RATIO (ref 1.1–2)
ALP SERPL-CCNC: 55 UNIT/L (ref 40–150)
ALT SERPL-CCNC: 22 UNIT/L (ref 0–55)
ANION GAP SERPL CALC-SCNC: 12 MEQ/L
AST SERPL-CCNC: 25 UNIT/L (ref 5–34)
BACTERIA BLD CULT: NORMAL
BASOPHILS # BLD AUTO: 0.04 X10(3)/MCL
BASOPHILS NFR BLD AUTO: 0.4 %
BILIRUB SERPL-MCNC: 0.5 MG/DL
BUN SERPL-MCNC: 48.7 MG/DL (ref 8.4–25.7)
CALCIUM SERPL-MCNC: 9.3 MG/DL (ref 8.8–10)
CHLORIDE SERPL-SCNC: 111 MMOL/L (ref 98–107)
CO2 SERPL-SCNC: 21 MMOL/L (ref 23–31)
CREAT SERPL-MCNC: 3.38 MG/DL (ref 0.73–1.18)
CREAT/UREA NIT SERPL: 14
EOSINOPHIL # BLD AUTO: 0 X10(3)/MCL (ref 0–0.9)
EOSINOPHIL NFR BLD AUTO: 0 %
ERYTHROCYTE [DISTWIDTH] IN BLOOD BY AUTOMATED COUNT: 13.9 % (ref 11.5–17)
GFR SERPLBLD CREATININE-BSD FMLA CKD-EPI: 19 ML/MIN/1.73/M2
GLOBULIN SER-MCNC: 4.5 GM/DL (ref 2.4–3.5)
GLUCOSE SERPL-MCNC: 102 MG/DL (ref 82–115)
HCT VFR BLD AUTO: 35.4 % (ref 42–52)
HGB BLD-MCNC: 11.8 G/DL (ref 14–18)
IMM GRANULOCYTES # BLD AUTO: 0.4 X10(3)/MCL (ref 0–0.04)
IMM GRANULOCYTES NFR BLD AUTO: 4 %
INR PPP: 1.8
LYMPHOCYTES # BLD AUTO: 1.45 X10(3)/MCL (ref 0.6–4.6)
LYMPHOCYTES NFR BLD AUTO: 14.5 %
MAGNESIUM SERPL-MCNC: 2.3 MG/DL (ref 1.6–2.6)
MCH RBC QN AUTO: 32 PG (ref 27–31)
MCHC RBC AUTO-ENTMCNC: 33.3 G/DL (ref 33–36)
MCV RBC AUTO: 95.9 FL (ref 80–94)
MONOCYTES # BLD AUTO: 1.01 X10(3)/MCL (ref 0.1–1.3)
MONOCYTES NFR BLD AUTO: 10.1 %
NEUTROPHILS # BLD AUTO: 7.07 X10(3)/MCL (ref 2.1–9.2)
NEUTROPHILS NFR BLD AUTO: 71 %
NRBC BLD AUTO-RTO: 0 %
OHS QRS DURATION: 162 MS
OHS QTC CALCULATION: 470 MS
PHOSPHATE SERPL-MCNC: 4.3 MG/DL (ref 2.3–4.7)
PLATELET # BLD AUTO: 237 X10(3)/MCL (ref 130–400)
PMV BLD AUTO: 9.2 FL (ref 7.4–10.4)
POTASSIUM SERPL-SCNC: 3.7 MMOL/L (ref 3.5–5.1)
PROT SERPL-MCNC: 6.9 GM/DL (ref 5.8–7.6)
PROTHROMBIN TIME: 20.1 SECONDS (ref 11.4–14)
RBC # BLD AUTO: 3.69 X10(6)/MCL (ref 4.7–6.1)
SODIUM SERPL-SCNC: 144 MMOL/L (ref 136–145)
WBC # BLD AUTO: 9.97 X10(3)/MCL (ref 4.5–11.5)

## 2024-06-20 PROCEDURE — 25000003 PHARM REV CODE 250

## 2024-06-20 PROCEDURE — 36415 COLL VENOUS BLD VENIPUNCTURE: CPT

## 2024-06-20 PROCEDURE — 85025 COMPLETE CBC W/AUTO DIFF WBC: CPT

## 2024-06-20 PROCEDURE — 99233 SBSQ HOSP IP/OBS HIGH 50: CPT | Mod: ,,, | Performed by: NURSE PRACTITIONER

## 2024-06-20 PROCEDURE — 63600175 PHARM REV CODE 636 W HCPCS

## 2024-06-20 PROCEDURE — 99900035 HC TECH TIME PER 15 MIN (STAT)

## 2024-06-20 PROCEDURE — 85610 PROTHROMBIN TIME: CPT

## 2024-06-20 PROCEDURE — 83735 ASSAY OF MAGNESIUM: CPT

## 2024-06-20 PROCEDURE — 94640 AIRWAY INHALATION TREATMENT: CPT

## 2024-06-20 PROCEDURE — 80053 COMPREHEN METABOLIC PANEL: CPT

## 2024-06-20 PROCEDURE — 21400001 HC TELEMETRY ROOM

## 2024-06-20 PROCEDURE — 84100 ASSAY OF PHOSPHORUS: CPT

## 2024-06-20 PROCEDURE — 63600175 PHARM REV CODE 636 W HCPCS: Performed by: NURSE PRACTITIONER

## 2024-06-20 RX ORDER — SODIUM CHLORIDE, SODIUM LACTATE, POTASSIUM CHLORIDE, CALCIUM CHLORIDE 600; 310; 30; 20 MG/100ML; MG/100ML; MG/100ML; MG/100ML
INJECTION, SOLUTION INTRAVENOUS CONTINUOUS
Status: DISCONTINUED | OUTPATIENT
Start: 2024-06-20 | End: 2024-06-22 | Stop reason: HOSPADM

## 2024-06-20 RX ORDER — HYDRALAZINE HYDROCHLORIDE 20 MG/ML
10 INJECTION INTRAMUSCULAR; INTRAVENOUS EVERY 6 HOURS PRN
Status: DISCONTINUED | OUTPATIENT
Start: 2024-06-20 | End: 2024-06-22 | Stop reason: HOSPADM

## 2024-06-20 RX ADMIN — METOPROLOL SUCCINATE 50 MG: 50 TABLET, FILM COATED, EXTENDED RELEASE ORAL at 09:06

## 2024-06-20 RX ADMIN — MUPIROCIN: 20 OINTMENT TOPICAL at 08:06

## 2024-06-20 RX ADMIN — ACETAMINOPHEN 650 MG: 325 TABLET, FILM COATED ORAL at 09:06

## 2024-06-20 RX ADMIN — DOXYCYCLINE 100 MG: 100 INJECTION, POWDER, LYOPHILIZED, FOR SOLUTION INTRAVENOUS at 11:06

## 2024-06-20 RX ADMIN — ATORVASTATIN CALCIUM 40 MG: 40 TABLET, FILM COATED ORAL at 08:06

## 2024-06-20 RX ADMIN — MUPIROCIN: 20 OINTMENT TOPICAL at 09:06

## 2024-06-20 RX ADMIN — DOXYCYCLINE 100 MG: 100 INJECTION, POWDER, LYOPHILIZED, FOR SOLUTION INTRAVENOUS at 10:06

## 2024-06-20 RX ADMIN — SODIUM CHLORIDE, POTASSIUM CHLORIDE, SODIUM LACTATE AND CALCIUM CHLORIDE: 600; 310; 30; 20 INJECTION, SOLUTION INTRAVENOUS at 09:06

## 2024-06-20 RX ADMIN — FLUTICASONE FUROATE AND VILANTEROL TRIFENATATE 1 PUFF: 200; 25 POWDER RESPIRATORY (INHALATION) at 07:06

## 2024-06-20 RX ADMIN — HYDRALAZINE HYDROCHLORIDE 10 MG: 20 INJECTION INTRAMUSCULAR; INTRAVENOUS at 05:06

## 2024-06-20 RX ADMIN — WARFARIN SODIUM 2.5 MG: 2.5 TABLET ORAL at 04:06

## 2024-06-20 RX ADMIN — CEFTRIAXONE SODIUM 2 G: 2 INJECTION, POWDER, FOR SOLUTION INTRAMUSCULAR; INTRAVENOUS at 04:06

## 2024-06-20 NOTE — PROGRESS NOTES
[facility] IM Progress Note    Date of Admit: 6/18/2024  Current Hospital Day: 3     Subjective:      Brief HPI:  Reid Mcqueen is a 68 y.o. male who  has a past medical history of A-fib, Acute renal failure, Allergy, Asthma, BPH (benign prostatic hyperplasia), COPD (chronic obstructive pulmonary disease), HLD (hyperlipidemia), Hypokalemia, Kidney stone, Osteoarthritis.  He  was transferred from outside facility and [place] here for nephro services and higher level of care.  He initially presented 6/14 with left lower extremity leg swelling, redness, pain and dyspnea with generalized weakness.  This has been going on for roughly the week prior, although has been gradually worsening.  He did endorse having some nausea and 1 episode of vomiting about a week ago.  He was being treated for sepsis secondary to his lower extremity cellulitis and started on vancomycin along with Zosyn and Cleocin.  That facility  also restarted his home lisinopril despite patient having an elevated creatinine at 1.69 (unknown baseline per my records). A DVT ultrasound and a CT PE scan was done both were negative on 6/14.  He had blood cultures x2 drawn which are no growth to date.  Two days after the contrasted CT scan was done BUN/creatinine began up trending 34/2.6 -> 51/4.88 to today at  62/5.15.     Also noted patient had a minimally elevated troponin at 0.049 in an elevated pro BNP in the 3000s.  Troponin was not trended at that facility.  Patient denies any prior history of myocardial infarction, congestive heart failure.  Denies any chest pain or shortness for breath at this time.  He is comfortable on room air.    Interval hx:  No acute events overnight.  Cellulitis redness and pain improving.  When it blood cell and Renal indices improving.    Past Medical History:   Diagnosis Date    A-fib     Acute renal failure 06/17/2024    Allergy     Asthma     BPH (benign prostatic hyperplasia)     COPD (chronic obstructive pulmonary disease)      HLD (hyperlipidemia)     Hypokalemia 06/16/2024    Kidney stone     Osteoarthritis     Osteoporosis        Past Surgical History:   Procedure Laterality Date    CATARACT EXTRACTION, BILATERAL         Review of patient's allergies indicates:   Allergen Reactions    Cardizem [diltiazem hcl] Other (See Comments)       Current Outpatient Medications   Medication Instructions    acetaminophen (TYLENOL) 650 mg, Oral, Every 8 hours    albuterol (PROVENTIL/VENTOLIN HFA) 90 mcg/actuation inhaler 2 puffs, Inhalation, Every 6 hours PRN, Rescue    atorvastatin (LIPITOR) 40 mg, Oral, Nightly    BREO ELLIPTA 200-25 mcg/dose DsDv diskus inhaler 1 puff, Inhalation, Daily    fexofenadine (ALLEGRA) 180 mg, Oral, Daily    lisinopriL 10 mg, Oral, Daily    metoprolol succinate (TOPROL-XL) 50 mg, Oral, Daily    tamsulosin (FLOMAX) 0.4 mg Cap 1 capsule, Oral, Nightly    torsemide (DEMADEX) 20 mg, Oral, Daily    warfarin (COUMADIN) 2.5 mg, Oral, Daily        Family History    None         Tobacco Use    Smoking status: Never    Smokeless tobacco: Not on file   Substance and Sexual Activity    Alcohol use: No    Drug use: No    Sexual activity: Not Currently        Review of Systems:  Review of Systems   Constitutional:  Negative for chills and fever.   Respiratory:  Negative for cough, shortness of breath and wheezing.    Cardiovascular:  Positive for leg swelling. Negative for chest pain and palpitations.   Gastrointestinal:  Positive for nausea. Negative for abdominal pain, diarrhea and vomiting.   Genitourinary:  Negative for flank pain and hematuria.   Skin:  Negative for itching and rash.        Painful red rash LLE   Neurological:  Negative for weakness and headaches.   Endo/Heme/Allergies:  Does not bruise/bleed easily.          Objective:     Vital Signs:  Vital Signs (Most Recent):  Temp: 98.1 °F (36.7 °C) (06/20/24 0820)  Pulse: 61 (06/20/24 0820)  Resp: 18 (06/20/24 0820)  BP: (!) 169/92 (06/20/24 0820)  SpO2: 96 % (06/20/24  "1300) Vital Signs (24h Range):  Temp:  [97.7 °F (36.5 °C)-98.4 °F (36.9 °C)] 98.1 °F (36.7 °C)  Pulse:  [60-65] 61  Resp:  [16-20] 18  SpO2:  [96 %-97 %] 96 %  BP: (132-169)/(73-92) 169/92   Body mass index is 39.94 kg/m².     Intake/Output Summary (Last 24 hours) at 6/20/2024 1503  Last data filed at 6/20/2024 0638  Gross per 24 hour   Intake 1100 ml   Output 2100 ml   Net -1000 ml       Physical Examination:  General:  Well developed, well nourished, no acute distress  HEENT: NCAT. PERRL, MMM.  Neck: supple, normal ROM, no JVD  CVS:  RRR. S1 and S2 normal. No murmurs, rubs, or gallops. Regular peripheral pulses. No peripheral edema  Resp:  Lungs clear to auscultation bilaterally, no wheezes, rales, or rhonchi. Normal respiratory effort.  GI:  Abdomen soft, non-tender, non-distended, normoactive bowel sounds  MSK:  Full range of motion, no obvious deformities.   Skin:  LLE with erythema and edema. Wife states the redness is improved since initially admitted in [place]. There are some blisters forming although no actively weeping wounds. There is some extension of erythema posterior thigh as well. Today cellulitis is improving, redness and pain decreased.   Neuro:  Alert and oriented x3, No focal neuro deficits, CNII-XII grossly intact            Laboratory:    Recent Labs   Lab 06/20/24  0345   WBC 9.97   HGB 11.8*   HCT 35.4*      MCV 95.9*   RDW 13.9     No results for input(s): "TROPONINI", "CKTOTAL", "CKMB", "BNP" in the last 24 hours.  Recent Labs   Lab 06/14/24  1555   TROPONINI 0.049*     No results for input(s): "CHOL", "HDL", "LDLCALC", "TRIG", "CHOLHDL" in the last 168 hours. Recent Labs   Lab 06/20/24  0345      K 3.7   *   CO2 21*   BUN 48.7*   CREATININE 3.38*   CALCIUM 9.3   MG 2.30   PHOS 4.3     Recent Labs   Lab 06/20/24  0345   ALBUMIN 2.4*   BILITOT 0.5   AST 25   ALKPHOS 55   ALT 22     No results for input(s): "IRON", "TIBC", "FERRITIN", "SATURATEDIRO", "EZXOYPCR48", "FOLATE" " in the last 168 hours.  Recent Labs   Lab 06/19/24  0404 06/20/24  0345   HGBA1C 6.2  --    INR 2.0* 1.8*          Microbiology Data:  Microbiology Results (last 7 days)       ** No results found for the last 168 hours. **             Other Results:    Radiology:    US Kidney    Result Date: 6/17/2024  1. Benign-appearing, right cortical cysts are present as described above. Electronically signed by: Jamaal Marie Date:    06/17/2024 Time:    14:06       Current Medications:     Infusions:   lactated ringers   Intravenous Continuous 75 mL/hr at 06/20/24 0937 New Bag at 06/20/24 0937         Scheduled:   atorvastatin  40 mg Oral QHS    cefTRIAXone (Rocephin) IV (PEDS and ADULTS)  2 g Intravenous Q24H    doxycycline IV (PEDS and ADULTS)  100 mg Intravenous Q12H    fluticasone furoate-vilanteroL  1 puff Inhalation Daily    metoprolol succinate  50 mg Oral Daily    mupirocin   Nasal BID    warfarin  2.5 mg Oral Daily         PRN:   atorvastatin tablet 40 mg    cefTRIAXone (ROCEPHIN) 2 g in dextrose 5 % in water (D5W) 100 mL IVPB (MB+)    doxycycline 100 mg in dextrose 5 % in water (D5W) 100 mL IVPB (MB+)    fluticasone furoate-vilanteroL 200-25 mcg/dose diskus inhaler 1 puff    metoprolol succinate (TOPROL-XL) 24 hr tablet 50 mg    mupirocin 2 % ointment    warfarin (COUMADIN) tablet 2.5 mg        Antibiotics and Day Number of Therapy:  Antibiotics (From admission, onward)      Start     Stop Route Frequency Ordered    06/19/24 1100  doxycycline 100 mg in dextrose 5 % in water (D5W) 100 mL IVPB (MB+)         -- IV Every 12 hours (non-standard times) 06/19/24 1006    06/18/24 2100  mupirocin 2 % ointment         06/23/24 2059 Nasl 2 times daily 06/18/24 1320    06/18/24 1400  cefTRIAXone (ROCEPHIN) 2 g in dextrose 5 % in water (D5W) 100 mL IVPB (MB+)         07/02/24 1359 IV Every 24 hours (non-standard times) 06/18/24 1348                 Assessment & Plan:     Non-oliguric Acute renal failure  - patient was transferred  here for Renal Services and nephrology has been consulted.  - I suspect there is some iatrogenic component at least for this patients acute renal failure. The previous facility restarted his home lisinopril and diuresed with torsemide despite patient having an elevated creatinine at 1.69 (unknown baseline per my records). A DVT ultrasound and a CT PE scan was done both were negative on 6/14.    - Two days after the contrasted CT scan was done BUN/creatinine began up trending 34/2.6 -> 51/4.88 to today at  62/5.15.   - urine studies ordered.   - continue IVF    LLE cellulitis  -continue Rocephin and doxycycline      pAF  Continue home warfarin. On warfarin 2/2 affordability.   PT/INR 18/1.4    Elevated pro- bnp 3000  Echocardiogram EF 55%    Wife is reporting to me the patient has a previous history of congestive heart failure.  He used to be on Entresto and Eliquis although with the had to be discontinued secondary to affordability.  He was previously on either Aldactone or Jardiance per his wife although she does not know why they have been discontinued. Wife did also report to me that patient had a non-ischemic heart cath in dec of either 2020 or 2021.   CM consult for records from [place].              10cm hepatic lobe cyst noted on scans.      CODE STATUS: full  Access: piv  Antibiotics: rocephin 2g q24h and doxycycline 100 mg b.i.d.  Diet: renal  DVT Prophylaxis: heparin  GI Prophylaxis: prn       Disposition: stable.  Blood cultures have been negative to date.  Continue LR at 75 cc an hour and monitor renal indices.        Luis Alfredo Eden MD  Internal Medicine Resident PGY-III

## 2024-06-20 NOTE — PROGRESS NOTES
Faculty addendum:     I have reviewed and concur with the resident's history, physical, assessment, and plan.  I have discussed with him all issues related to the diagnosis, workup and treatment plan.Care provided as reasonable and necessary.     Patient seen and examined this morning.    Isotonic crystalloids switch to lactated Ringer's given mild hypochloremic non gap acidosis, renal indices continue to improve, creatinine 3.38.    Leukocytosis resolved after doxycycline initiated yesterday for lower extremity cellulitis of the left tibia.    Continue Rocephin and doxy in the meantime, hopefully creatinine is nearing closer back to baseline and can discharge before the weekend.    Fully dictated resident progress note forthcoming

## 2024-06-20 NOTE — PROGRESS NOTES
"[facility]  Nephrology Progress Note    Patient Name: Reid Mcqueen  Age: 68 y.o.  : 1955  MRN: [id number]  Admission Date: 2024    Hospital course  Reid Mcqueen is a 68 y.o. White male with past medical history of hypertension, atrial fibrillation, COPD, BPH, dyslipidemia, osteoarthritis, and obesity. Patient was brought to an outside hospital for treatment of left lower extremity cellulitis 2024. Patient's family member noticed that patient was not himself on 2024. When she came to check on him she noticed redness and swelling of his left lower extremity and some confusion. Patient also had associated diarrhea. He could not recall when the rash started or what precipitated his symptoms. There were no alleviating symptoms. Patient was treated with vancomycin and piperacillin-tazobactam at an outside facility, however, he developed supratherapeutic vancomycin level and subsequent acute kidney injury. He was transferred to [facility] for higher level of care and nephrology services. Patient was admitted to internal medicine service, Nephrology was consulted for assistance with management of acute kidney injury.     Subjective  No acute events overnight.  Patient is alert and oriented.    Review of Systems  Respiratory: Negative.     Cardiovascular:  Positive for leg swelling.     Objective  BP (!) 169/92 (Patient Position: Lying)   Pulse 61   Temp 98.1 °F (36.7 °C) (Oral)   Resp 18   Ht 5' 7" (1.702 m)   Wt 115.7 kg (255 lb)   SpO2 96%   BMI 39.94 kg/m²     Intake/Output Summary (Last 24 hours) at 2024 0826  Last data filed at 2024 0638  Gross per 24 hour   Intake 3597.67 ml   Output 2700 ml   Net 897.67 ml       Physical Exam  General appearance: Patient is in no acute distress.  HEENT: PERRLA, EOMI, no scleral icterus, no JVD. Neck is supple.  Chest: Respirations are unlabored. Lungs sounds are clear.   Heart: S1, " S2.   Abdomen: Benign.  : Deferred.  Extremities: No right leg edema, peripheral pulses are palpable. LLE is edematous with erythema and bullous rash  Neuro: No focal deficits.     Medications  Scheduled Meds:   atorvastatin  40 mg Oral QHS    cefTRIAXone (Rocephin) IV (PEDS and ADULTS)  2 g Intravenous Q24H    doxycycline IV (PEDS and ADULTS)  100 mg Intravenous Q12H    fluticasone furoate-vilanteroL  1 puff Inhalation Daily    metoprolol succinate  50 mg Oral Daily    mupirocin   Nasal BID    warfarin  2.5 mg Oral Daily     Continuous Infusions:   sodium chloride 0.9%   Intravenous Continuous 100 mL/hr at 06/19/24 1804 New Bag at 06/19/24 1804     PRN Meds:.  Current Facility-Administered Medications:     acetaminophen, 650 mg, Oral, Q4H PRN    acetaminophen, 650 mg, Oral, Q8H PRN    albuterol, 2 puff, Inhalation, Q6H PRN    aluminum-magnesium hydroxide-simethicone, 30 mL, Oral, QID PRN    dextrose 10%, 12.5 g, Intravenous, PRN    dextrose 10%, 25 g, Intravenous, PRN    glucagon (human recombinant), 1 mg, Intramuscular, PRN    glucose, 16 g, Oral, PRN    glucose, 24 g, Oral, PRN    naloxone, 0.02 mg, Intravenous, PRN    sodium chloride 0.9%, 10 mL, Intravenous, Q12H PRN     Imaging:    Reviewed    Laboratory Data:    Chemistry  Recent Labs     06/18/24  0414 06/19/24  0404 06/20/24  0345   * 141 144   K 4.0 4.0 3.7   CO2 22 21* 21*   BUN 62* 55.2* 48.7*   CREATININE 5.15* 4.46* 3.38*   EGFRNORACEVR 11 14 19   GLUCOSE 105 104 102   CALCIUM 8.4 9.2 9.3   MG 2.50* 2.50 2.30   PHOS  --  4.5 4.3      LFT  Lab Results   Component Value Date    ALKPHOS 55 06/20/2024    AST 25 06/20/2024    ALT 22 06/20/2024    BILITOT 0.5 06/20/2024    ALBUMIN 2.4 (L) 06/20/2024      CKD-MBD  Lab Results   Component Value Date    .6 (H) 06/19/2024    SXDGAMCU77AR 17 (L) 06/19/2024      Hematology  Recent Labs     06/18/24  0414 06/19/24  0404 06/20/24  0345   WBC 11.60* 14.94* 9.97   HGB 12.4* 12.3* 11.8*   HCT 37.1 38.5*  35.4*    229 237      Additional serology  Lab Results   Component Value Date    HGBA1C 6.2 06/19/2024       Urine studies  Lab Results   Component Value Date    APPEARANCEUA Clear 06/14/2024    SGUA 1.025 06/14/2024    PROTEINUA 30 (A) 06/14/2024    KETONESUA Negative 06/14/2024    LEUKOCYTESUR Negative 06/14/2024    RBCUA 0-5 06/14/2024    WBCUA 0-5 06/14/2024    BACTERIA Few (A) 06/14/2024    HYALINECASTS Moderate (A) 06/14/2024    CREATRANDUR 49.7 (L) 06/18/2024    CREATRANDUR 49.6 (L) 06/18/2024    PROTEINURINE 12.1 06/18/2024        Impression  Left lower extremity cellulitis  Nonoliguric acute kidney injury  History of paroxysmal atrial fibrillation, questionable history of heart failure, BPH, hypertension, and morbid obesity     Plan  Renal indices are improving.  Patient is nonoliguric.  He has mild metabolic acidosis, will change IV fluids to lactated Ringer's.  Continue supportive care.     Kimber Ivory NP  [facility] Nephrology   6/20/2024

## 2024-06-20 NOTE — CONSULTS
Wound care was consulted for LLE cellulitis present on admission. Pt was a transfer from an outside facility for renal services. Pt was seen by wound care coverage yesterday (see note), where there was no open wounds noted and reports of improvement of erythema since admission. No dressings needed but will order daily cleaning with CHG to decrease bacterial load to the skin. Edema like compounded by CHF history of fluid overload related to renal issues. Pt may benefit from mild compression in the future when cellulitis resolves. No need for wound care to follow at this time, please reach back out if skin changes are noted. No need for clinic follow up due to closed nature of LLE.

## 2024-06-21 LAB
ALBUMIN SERPL-MCNC: 2.4 G/DL (ref 3.4–4.8)
ALBUMIN/GLOB SERPL: 0.6 RATIO (ref 1.1–2)
ALP SERPL-CCNC: 47 UNIT/L (ref 40–150)
ALT SERPL-CCNC: 18 UNIT/L (ref 0–55)
ANION GAP SERPL CALC-SCNC: 7 MEQ/L
ANION GAP SERPL CALC-SCNC: 9 MEQ/L
AST SERPL-CCNC: 22 UNIT/L (ref 5–34)
BASOPHILS # BLD AUTO: 0.04 X10(3)/MCL
BASOPHILS NFR BLD AUTO: 0.5 %
BILIRUB SERPL-MCNC: 0.5 MG/DL
BUN SERPL-MCNC: 33.9 MG/DL (ref 8.4–25.7)
BUN SERPL-MCNC: 39.5 MG/DL (ref 8.4–25.7)
CALCIUM SERPL-MCNC: 9.2 MG/DL (ref 8.8–10)
CALCIUM SERPL-MCNC: 9.7 MG/DL (ref 8.8–10)
CHLORIDE SERPL-SCNC: 112 MMOL/L (ref 98–107)
CHLORIDE SERPL-SCNC: 112 MMOL/L (ref 98–107)
CO2 SERPL-SCNC: 21 MMOL/L (ref 23–31)
CO2 SERPL-SCNC: 24 MMOL/L (ref 23–31)
CREAT SERPL-MCNC: 2.56 MG/DL (ref 0.73–1.18)
CREAT SERPL-MCNC: 2.58 MG/DL (ref 0.73–1.18)
CREAT/UREA NIT SERPL: 13
CREAT/UREA NIT SERPL: 15
EOSINOPHIL # BLD AUTO: 0 X10(3)/MCL (ref 0–0.9)
EOSINOPHIL NFR BLD AUTO: 0 %
ERYTHROCYTE [DISTWIDTH] IN BLOOD BY AUTOMATED COUNT: 13.7 % (ref 11.5–17)
GFR SERPLBLD CREATININE-BSD FMLA CKD-EPI: 26 ML/MIN/1.73/M2
GFR SERPLBLD CREATININE-BSD FMLA CKD-EPI: 26 ML/MIN/1.73/M2
GLOBULIN SER-MCNC: 4.1 GM/DL (ref 2.4–3.5)
GLUCOSE SERPL-MCNC: 101 MG/DL (ref 82–115)
GLUCOSE SERPL-MCNC: 106 MG/DL (ref 82–115)
HCT VFR BLD AUTO: 34.8 % (ref 42–52)
HGB BLD-MCNC: 11.5 G/DL (ref 14–18)
HOLD SPECIMEN: NORMAL
IMM GRANULOCYTES # BLD AUTO: 0.54 X10(3)/MCL (ref 0–0.04)
IMM GRANULOCYTES NFR BLD AUTO: 7.1 %
INR PPP: 1.6
LYMPHOCYTES # BLD AUTO: 1.35 X10(3)/MCL (ref 0.6–4.6)
LYMPHOCYTES NFR BLD AUTO: 17.6 %
MAGNESIUM SERPL-MCNC: 2 MG/DL (ref 1.6–2.6)
MCH RBC QN AUTO: 31.8 PG (ref 27–31)
MCHC RBC AUTO-ENTMCNC: 33 G/DL (ref 33–36)
MCV RBC AUTO: 96.1 FL (ref 80–94)
MONOCYTES # BLD AUTO: 0.81 X10(3)/MCL (ref 0.1–1.3)
MONOCYTES NFR BLD AUTO: 10.6 %
NEUTROPHILS # BLD AUTO: 4.91 X10(3)/MCL (ref 2.1–9.2)
NEUTROPHILS NFR BLD AUTO: 64.2 %
NRBC BLD AUTO-RTO: 0 %
PHOSPHATE SERPL-MCNC: 3.6 MG/DL (ref 2.3–4.7)
PLATELET # BLD AUTO: 275 X10(3)/MCL (ref 130–400)
PMV BLD AUTO: 9.3 FL (ref 7.4–10.4)
POTASSIUM SERPL-SCNC: 3.5 MMOL/L (ref 3.5–5.1)
POTASSIUM SERPL-SCNC: 4 MMOL/L (ref 3.5–5.1)
PROT SERPL-MCNC: 6.5 GM/DL (ref 5.8–7.6)
PROTHROMBIN TIME: 18.9 SECONDS (ref 11.4–14)
RBC # BLD AUTO: 3.62 X10(6)/MCL (ref 4.7–6.1)
SODIUM SERPL-SCNC: 142 MMOL/L (ref 136–145)
SODIUM SERPL-SCNC: 143 MMOL/L (ref 136–145)
WBC # BLD AUTO: 7.65 X10(3)/MCL (ref 4.5–11.5)

## 2024-06-21 PROCEDURE — 83735 ASSAY OF MAGNESIUM: CPT

## 2024-06-21 PROCEDURE — 25000003 PHARM REV CODE 250: Performed by: NURSE PRACTITIONER

## 2024-06-21 PROCEDURE — 25000003 PHARM REV CODE 250

## 2024-06-21 PROCEDURE — 94640 AIRWAY INHALATION TREATMENT: CPT

## 2024-06-21 PROCEDURE — 63600175 PHARM REV CODE 636 W HCPCS

## 2024-06-21 PROCEDURE — 94761 N-INVAS EAR/PLS OXIMETRY MLT: CPT

## 2024-06-21 PROCEDURE — 21400001 HC TELEMETRY ROOM

## 2024-06-21 PROCEDURE — 80053 COMPREHEN METABOLIC PANEL: CPT

## 2024-06-21 PROCEDURE — 85610 PROTHROMBIN TIME: CPT

## 2024-06-21 PROCEDURE — 99900035 HC TECH TIME PER 15 MIN (STAT)

## 2024-06-21 PROCEDURE — 36415 COLL VENOUS BLD VENIPUNCTURE: CPT

## 2024-06-21 PROCEDURE — 84100 ASSAY OF PHOSPHORUS: CPT

## 2024-06-21 PROCEDURE — 63600175 PHARM REV CODE 636 W HCPCS: Performed by: NURSE PRACTITIONER

## 2024-06-21 PROCEDURE — 99233 SBSQ HOSP IP/OBS HIGH 50: CPT | Mod: ,,, | Performed by: NURSE PRACTITIONER

## 2024-06-21 PROCEDURE — 85025 COMPLETE CBC W/AUTO DIFF WBC: CPT

## 2024-06-21 RX ORDER — TAMSULOSIN HYDROCHLORIDE 0.4 MG/1
0.4 CAPSULE ORAL DAILY
Status: DISCONTINUED | OUTPATIENT
Start: 2024-06-21 | End: 2024-06-22 | Stop reason: HOSPADM

## 2024-06-21 RX ORDER — DOXYCYCLINE 100 MG/1
100 CAPSULE ORAL EVERY 12 HOURS
Qty: 10 CAPSULE | Refills: 0 | Status: SHIPPED | OUTPATIENT
Start: 2024-06-21 | End: 2024-06-26

## 2024-06-21 RX ORDER — ERGOCALCIFEROL 1.25 MG/1
50000 CAPSULE ORAL
Qty: 7 CAPSULE | Refills: 0 | Status: SHIPPED | OUTPATIENT
Start: 2024-06-28 | End: 2024-08-10

## 2024-06-21 RX ORDER — FUROSEMIDE 10 MG/ML
20 INJECTION INTRAMUSCULAR; INTRAVENOUS ONCE
Status: DISCONTINUED | OUTPATIENT
Start: 2024-06-21 | End: 2024-06-21

## 2024-06-21 RX ORDER — ACETAMINOPHEN 500 MG
1000 TABLET ORAL ONCE
Status: COMPLETED | OUTPATIENT
Start: 2024-06-21 | End: 2024-06-21

## 2024-06-21 RX ORDER — ERGOCALCIFEROL 1.25 MG/1
50000 CAPSULE ORAL
Status: DISCONTINUED | OUTPATIENT
Start: 2024-06-21 | End: 2024-06-22 | Stop reason: HOSPADM

## 2024-06-21 RX ORDER — DOXYCYCLINE HYCLATE 100 MG
100 TABLET ORAL EVERY 12 HOURS
Status: DISCONTINUED | OUTPATIENT
Start: 2024-06-21 | End: 2024-06-22 | Stop reason: HOSPADM

## 2024-06-21 RX ORDER — POTASSIUM CHLORIDE 20 MEQ/1
40 TABLET, EXTENDED RELEASE ORAL ONCE
Status: COMPLETED | OUTPATIENT
Start: 2024-06-21 | End: 2024-06-21

## 2024-06-21 RX ORDER — LISINOPRIL 10 MG/1
10 TABLET ORAL DAILY
Status: CANCELLED
Start: 2024-06-26

## 2024-06-21 RX ORDER — AMOXICILLIN AND CLAVULANATE POTASSIUM 875; 125 MG/1; MG/1
1 TABLET, FILM COATED ORAL 2 TIMES DAILY
Qty: 10 TABLET | Refills: 0 | Status: SHIPPED | OUTPATIENT
Start: 2024-06-21 | End: 2024-06-26

## 2024-06-21 RX ADMIN — FLUTICASONE FUROATE AND VILANTEROL TRIFENATATE 1 PUFF: 200; 25 POWDER RESPIRATORY (INHALATION) at 06:06

## 2024-06-21 RX ADMIN — MUPIROCIN: 20 OINTMENT TOPICAL at 08:06

## 2024-06-21 RX ADMIN — METOPROLOL SUCCINATE 50 MG: 50 TABLET, FILM COATED, EXTENDED RELEASE ORAL at 08:06

## 2024-06-21 RX ADMIN — ERGOCALCIFEROL 50000 UNITS: 1.25 CAPSULE ORAL at 10:06

## 2024-06-21 RX ADMIN — WARFARIN SODIUM 2.5 MG: 2.5 TABLET ORAL at 04:06

## 2024-06-21 RX ADMIN — ACETAMINOPHEN 650 MG: 325 TABLET, FILM COATED ORAL at 02:06

## 2024-06-21 RX ADMIN — SODIUM CHLORIDE, POTASSIUM CHLORIDE, SODIUM LACTATE AND CALCIUM CHLORIDE 1000 ML: 600; 310; 30; 20 INJECTION, SOLUTION INTRAVENOUS at 08:06

## 2024-06-21 RX ADMIN — SODIUM CHLORIDE, POTASSIUM CHLORIDE, SODIUM LACTATE AND CALCIUM CHLORIDE: 600; 310; 30; 20 INJECTION, SOLUTION INTRAVENOUS at 02:06

## 2024-06-21 RX ADMIN — ACETAMINOPHEN 650 MG: 325 TABLET, FILM COATED ORAL at 08:06

## 2024-06-21 RX ADMIN — HYDRALAZINE HYDROCHLORIDE 10 MG: 20 INJECTION INTRAMUSCULAR; INTRAVENOUS at 10:06

## 2024-06-21 RX ADMIN — TAMSULOSIN HYDROCHLORIDE 0.4 MG: 0.4 CAPSULE ORAL at 10:06

## 2024-06-21 RX ADMIN — DOXYCYCLINE HYCLATE 100 MG: 100 TABLET, COATED ORAL at 08:06

## 2024-06-21 RX ADMIN — POTASSIUM CHLORIDE 40 MEQ: 1500 TABLET, EXTENDED RELEASE ORAL at 08:06

## 2024-06-21 RX ADMIN — ATORVASTATIN CALCIUM 40 MG: 40 TABLET, FILM COATED ORAL at 08:06

## 2024-06-21 RX ADMIN — CEFTRIAXONE SODIUM 2 G: 2 INJECTION, POWDER, FOR SOLUTION INTRAMUSCULAR; INTRAVENOUS at 11:06

## 2024-06-21 RX ADMIN — SODIUM CHLORIDE, POTASSIUM CHLORIDE, SODIUM LACTATE AND CALCIUM CHLORIDE: 600; 310; 30; 20 INJECTION, SOLUTION INTRAVENOUS at 05:06

## 2024-06-21 RX ADMIN — DOXYCYCLINE HYCLATE 100 MG: 100 TABLET, COATED ORAL at 10:06

## 2024-06-21 RX ADMIN — ACETAMINOPHEN 1000 MG: 500 TABLET ORAL at 02:06

## 2024-06-21 NOTE — DISCHARGE SUMMARY
[facility] Internal Medicine Discharge Summary    Admitting Physician: Lino Quiñones MD  Attending Physician: Emilee Montana MD  Date of Admit: 6/18/2024  Date of Discharge: 6/22/2024    Condition: Good  Outcome: Condition has improved and patient is now ready for discharge.  DISPOSITION: Home or Self Care          Discharge Diagnoses     Patient Active Problem List   Diagnosis    Hepatic cyst    Severe sepsis    Cellulitis of left lower extremity    Hypokalemia    Acute renal failure    Malnutrition       Principal Problem:  Acute renal failure    Consultants and Procedures     Consultants:  WOUND CARE CONSULT  IP CONSULT TO NEPHROLOGY  IP CONSULT TO SOCIAL WORK/CASE MANAGEMENT    Procedures:   * No surgery found *     Brief Admission History      Reid Mcqueen is a 68 y.o. male who  has a past medical history of A-fib, Acute renal failure, Allergy, Asthma, BPH (benign prostatic hyperplasia), COPD (chronic obstructive pulmonary disease), HLD (hyperlipidemia), Hypokalemia, Kidney stone, Osteoarthritis.  He  was transferred from outside facility and [place] here for nephro services and higher level of care.  He initially presented 6/14 with left lower extremity leg swelling, redness, pain and dyspnea with generalized weakness.  This has been going on for roughly the week prior, although has been gradually worsening.  He did endorse having some nausea and 1 episode of vomiting about a week ago.  He was being treated for sepsis secondary to his lower extremity cellulitis and started on vancomycin along with Zosyn and Cleocin.  That facility  also restarted his home lisinopril despite patient having an elevated creatinine at 1.69 (unknown baseline per my records). A DVT ultrasound and a CT PE scan was done both were negative on 6/14.  He had blood cultures x2 drawn which are no growth to date.  Two days after the contrasted CT scan was done BUN/creatinine began up trending 34/2.6 -> 51/4.88 to today at  62/5.15.  "     Also noted patient had a minimally elevated troponin at 0.049 in an elevated pro BNP in the 3000s.  Troponin was not trended at that facility.  Patient denies any prior history of myocardial infarction, congestive heart failure.  Denies any chest pain or shortness for breath at this time.  He is comfortable on room air.    Hospital Course with Pertinent Findings     Patient arrived and was started on IV fluids with a bolus and maintenance rate.  His renal indices responded appropriately and has been having good urine output.  Creatinine is still elevated although continues trending in the right direction.  patient is tolerating p.o. and not having any nausea.  We additionally initiated Rocephin to treat his cellulitis while inpatient.  Doxycycline was added on due to increased leukocytosis with Rocephin monotherapy.  Since starting doxycycline leukocytosis resolved.  Cellulitic area of the left lower extremities improved.    Patient stating he is ready to go home.  He states he is going to make an appointment with his PCP today to follow up for next week.       Discharge physical exam:  Vitals  BP: (!) 145/75  Temp: 98.2 °F (36.8 °C)  Temp Source: Oral  Pulse: 70  Resp: 18  SpO2: 98 %  Height: 5' 7" (170.2 cm)  Weight: 115.7 kg (255 lb)    Vital signs and nursing notes reviewed.  Constitutional: Patient is in NAD. Awake and alert.    Head: Atraumatic. Normocephalic.  Eyes: Conjunctivae nl. No scleral icterus.  ENT: Mucous membranes are moist.  Airway patent  Neck: Supple. Full ROM.   Cardiovascular: Regular rate and rhythm. No murmurs, rubs, or gallops. Distal pulses are 2+ and symmetric .  Pulmonary/Chest: No respiratory distress. Clear to auscultation bilaterally. No wheezing, rales, or rhonchi.  Abdominal: Soft. Non-distended. No TTP. No rebound, guarding, or rigidity.   Musculoskeletal: Moves all extremities.  LLE erythema Significantly improved/resolved since admission. Edema still present.  Skin: see " media from wound care notes.   Neurological: Awake and alert. No acute focal neurological deficits are appreciated.      TIME SPENT ON DISCHARGE: 50 minutes    Discharge Medications        Medication List        START taking these medications      amLODIPine 10 MG tablet  Commonly known as: NORVASC  Take 1 tablet (10 mg total) by mouth once daily.  Start taking on: June 23, 2024     amoxicillin-clavulanate 875-125mg 875-125 mg per tablet  Commonly known as: AUGMENTIN  Take 1 tablet by mouth 2 (two) times daily. for 5 days     doxycycline 100 MG Cap  Commonly known as: VIBRAMYCIN  Take 1 capsule (100 mg total) by mouth every 12 (twelve) hours. for 5 days     ergocalciferol 50,000 unit Cap  Commonly known as: ERGOCALCIFEROL  Take 1 capsule (50,000 Units total) by mouth every 7 days. for 7 doses  Start taking on: June 28, 2024            CONTINUE taking these medications      acetaminophen 650 MG Tbsr  Commonly known as: TYLENOL     albuterol 90 mcg/actuation inhaler  Commonly known as: PROVENTIL/VENTOLIN HFA     atorvastatin 40 MG tablet  Commonly known as: LIPITOR     BREO ELLIPTA 200-25 mcg/dose Dsdv diskus inhaler  Generic drug: fluticasone furoate-vilanteroL     fexofenadine 180 MG tablet  Commonly known as: ALLEGRA     metoprolol succinate 50 MG 24 hr tablet  Commonly known as: TOPROL-XL     tamsulosin 0.4 mg Cap  Commonly known as: FLOMAX     warfarin 2.5 MG tablet  Commonly known as: COUMADIN            STOP taking these medications      lisinopriL 10 MG tablet     torsemide 20 MG Tab  Commonly known as: DEMADEX               Where to Get Your Medications        These medications were sent to [facility] [address] [address]      Phone: [phone]   amLODIPine 10 MG tablet  amoxicillin-clavulanate 875-125mg 875-125 mg per tablet  doxycycline 100 MG Cap  ergocalciferol 50,000 unit Cap         Discharge Information:     The case has been  discussed with staff/attending physician. The patient has been seen and evaluated during rounds where the plan to discharge was discussed with pt. F/u with pcp, patient states he will see his pcp within the next 7 days. RTER if any new/worsening sx. Pt/family express understanding and agree with the plan.      Instructed to hold lisinopril and torsemide at home until able to see PCP.    Send home with augmentin and doxy for 5 day course.       Kevin Campa DO  [facility] Internal Medicine, -II

## 2024-06-21 NOTE — PROGRESS NOTES
"[facility]  Nephrology Progress Note    Patient Name: Reid Mcqueen  Age: 68 y.o.  : 1955  MRN: [id number]  Admission Date: 2024    Hospital course  Reid Mcqueen is a 68 y.o. White male with past medical history of hypertension, atrial fibrillation, COPD, BPH, dyslipidemia, osteoarthritis, and obesity. Patient was brought to an outside hospital for treatment of left lower extremity cellulitis 2024. Patient's family member noticed that patient was not himself on 2024. When she came to check on him she noticed redness and swelling of his left lower extremity and some confusion. Patient also had associated diarrhea. He could not recall when the rash started or what precipitated his symptoms. There were no alleviating symptoms. Patient was treated with vancomycin and piperacillin-tazobactam at an outside facility, however, he developed supratherapeutic vancomycin level and subsequent acute kidney injury. He was transferred to [facility] for higher level of care and nephrology services. Patient was admitted to internal medicine service, Nephrology was consulted for assistance with management of acute kidney injury.     Subjective  No acute events overnight.  Patient is alert and oriented.    Review of Systems  Respiratory: Negative.     Cardiovascular:  Positive for leg swelling.     Objective  BP (!) 146/86   Pulse 74   Temp 97.7 °F (36.5 °C) (Oral)   Resp 16   Ht 5' 7" (1.702 m)   Wt 115.7 kg (255 lb)   SpO2 97%   BMI 39.94 kg/m²     Intake/Output Summary (Last 24 hours) at 2024 0755  Last data filed at 2024 0615  Gross per 24 hour   Intake 9690.19 ml   Output 1801 ml   Net 7889.19 ml       Physical Exam  General appearance: Patient is in no acute distress.  HEENT: PERRLA, EOMI, no scleral icterus, no JVD. Neck is supple.  Chest: Respirations are unlabored. Lungs sounds are clear.   Heart: S1, S2.   Abdomen: " Benign.  : Deferred.  Extremities: No right leg edema, peripheral pulses are palpable. LLE is edematous with erythema and bullous rash  Neuro: No focal deficits.     Medications  Scheduled Meds:   atorvastatin  40 mg Oral QHS    cefTRIAXone (Rocephin) IV (PEDS and ADULTS)  2 g Intravenous Q24H    doxycycline IV (PEDS and ADULTS)  100 mg Intravenous Q12H    fluticasone furoate-vilanteroL  1 puff Inhalation Daily    metoprolol succinate  50 mg Oral Daily    mupirocin   Nasal BID    potassium chloride  40 mEq Oral Once    warfarin  2.5 mg Oral Daily     Continuous Infusions:   lactated ringers   Intravenous Continuous 75 mL/hr at 06/21/24 0615 Rate Verify at 06/21/24 0615     PRN Meds:.  Current Facility-Administered Medications:     acetaminophen, 650 mg, Oral, Q4H PRN    acetaminophen, 650 mg, Oral, Q8H PRN    albuterol, 2 puff, Inhalation, Q6H PRN    aluminum-magnesium hydroxide-simethicone, 30 mL, Oral, QID PRN    dextrose 10%, 12.5 g, Intravenous, PRN    dextrose 10%, 25 g, Intravenous, PRN    glucagon (human recombinant), 1 mg, Intramuscular, PRN    glucose, 16 g, Oral, PRN    glucose, 24 g, Oral, PRN    hydrALAZINE, 10 mg, Intravenous, Q6H PRN    naloxone, 0.02 mg, Intravenous, PRN    sodium chloride 0.9%, 10 mL, Intravenous, Q12H PRN     Imaging:    Reviewed    Laboratory Data:    Chemistry  Recent Labs     06/19/24  0404 06/20/24  0345 06/21/24  0337    144 142   K 4.0 3.7 3.5   CO2 21* 21* 21*   BUN 55.2* 48.7* 39.5*   CREATININE 4.46* 3.38* 2.58*   EGFRNORACEVR 14 19 26   GLUCOSE 104 102 106   CALCIUM 9.2 9.3 9.2   MG 2.50 2.30 2.00   PHOS 4.5 4.3 3.6      LFT  Lab Results   Component Value Date    ALKPHOS 47 06/21/2024    AST 22 06/21/2024    ALT 18 06/21/2024    BILITOT 0.5 06/21/2024    ALBUMIN 2.4 (L) 06/21/2024      CKD-MBD  Lab Results   Component Value Date    .6 (H) 06/19/2024    LCFNQODN30DC 17 (L) 06/19/2024      Hematology  Recent Labs     06/19/24  0404 06/20/24  2983  06/21/24  0337   WBC 14.94* 9.97 7.65   HGB 12.3* 11.8* 11.5*   HCT 38.5* 35.4* 34.8*    237 275      Additional serology  Lab Results   Component Value Date    HGBA1C 6.2 06/19/2024       Urine studies  Lab Results   Component Value Date    APPEARANCEUA Clear 06/14/2024    SGUA 1.025 06/14/2024    PROTEINUA 30 (A) 06/14/2024    KETONESUA Negative 06/14/2024    LEUKOCYTESUR Negative 06/14/2024    RBCUA 0-5 06/14/2024    WBCUA 0-5 06/14/2024    BACTERIA Few (A) 06/14/2024    HYALINECASTS Moderate (A) 06/14/2024    CREATRANDUR 49.7 (L) 06/18/2024    CREATRANDUR 49.6 (L) 06/18/2024    PROTEINURINE 12.1 06/18/2024        Impression  Left lower extremity cellulitis  Nonoliguric acute kidney injury  History of paroxysmal atrial fibrillation, questionable history of heart failure, BPH, hypertension, and morbid obesity   Secondary hyperparathyroidism   Hypovitaminosis D    Plan  Renal indices continue to improve gradually. Patient reports worsening swelling to left lower extremity and mild discomfort due to the swelling.  Will give 20 mg of furosemide x1 dose today.  Hypertension is uncontrolled, will resume tamsulosin, continue metoprolol at current dose.  Will wait to resume ACE inhibitor until renal indices are close to baseline.  Intact PTH is elevated, which suggests chronicity of chronic kidney disease.  Proteinuria is in the moderate range and can be evaluated on outpatient basis once renal function stabilizes.  There are no reservations for discharge from Nephrology perspective as long as patient is deemed stable by primary team and close outpatient follow-up is ensured.    Kimber Ivory NP  [facility] Nephrology   6/21/2024

## 2024-06-21 NOTE — PROGRESS NOTES
Inpatient Nutrition Assessment    Admit Date: 6/18/2024   Total duration of encounter: 3 days   Patient Age: 68 y.o.    Nutrition Recommendation/Prescription     Continue renal non HD diet (as renal fx improves--change to heart healthy diet)  Continue chocolate boost tid; Boost (provides 240 kcal, 10 g protein per serving) + leg cellulitis   Pt education on diet/complete  MVI/fe  Biweeky wt  Will monitor nutriton status     Communication of Recommendations: reviewed with nurse, reviewed with patient, and reviewed with caregiver    Nutrition Assessment     Malnutrition Assessment/Nutrition-Focused Physical Exam    Malnutrition Context: acute illness or injury (06/19/24 1357)  Malnutrition Level: moderate (06/19/24 1357)  Energy Intake (Malnutrition): less than or equal to 50% for greater than or equal to 5 days (06/19/24 1357)  Weight Loss (Malnutrition):  (does not meet criterai) (06/19/24 1357)     Orbital Region (Subcutaneous Fat Loss): well nourished  Upper Arm Region (Subcutaneous Fat Loss): well nourished        [place] Region (Muscle Loss): well nourished  Clavicle Bone Region (Muscle Loss): well nourished  Clavicle and Acromion Bone Region (Muscle Loss): well nourished                 Fluid Accumulation (Malnutrition): moderate (06/19/24 1357)        A minimum of two characteristics is recommended for diagnosis of either severe or non-severe malnutrition.    Chart Review    Reason Seen: continuous nutrition monitoring and follow-up    Malnutrition Screening Tool Results   Have you recently lost weight without trying?: No  Have you been eating poorly because of a decreased appetite?: No   MST Score: 0   Diagnosis:  Non oliguria ARF/CAROLINE/ATN (improving), LLE cellulitis, PAF, ? CHF; vit D def     Relevant Medical History: afib, acute renal failure, asthma, BPH, COPD, HLD, low K, osteoarthritis     Scheduled Medications:  atorvastatin, 40 mg, QHS  cefTRIAXone (Rocephin) IV (PEDS and ADULTS), 2 g, Q24H  doxycycline,  100 mg, Q12H  ergocalciferol, 50,000 Units, Q7 Days  fluticasone furoate-vilanteroL, 1 puff, Daily  metoprolol succinate, 50 mg, Daily  mupirocin, , BID  tamsulosin, 0.4 mg, Daily  warfarin, 2.5 mg, Daily    Continuous Infusions:  lactated ringers, Last Rate: 75 mL/hr at 06/21/24 0615    PRN Medications:  acetaminophen, 650 mg, Q4H PRN  acetaminophen, 650 mg, Q8H PRN  albuterol, 2 puff, Q6H PRN  aluminum-magnesium hydroxide-simethicone, 30 mL, QID PRN  dextrose 10%, 12.5 g, PRN  dextrose 10%, 25 g, PRN  glucagon (human recombinant), 1 mg, PRN  glucose, 16 g, PRN  glucose, 24 g, PRN  hydrALAZINE, 10 mg, Q6H PRN  naloxone, 0.02 mg, PRN  sodium chloride 0.9%, 10 mL, Q12H PRN    Calorie Containing IV Medications: no significant kcals from medications at this time    Recent Labs   Lab 06/14/24  1555 06/15/24  0430 06/15/24  0715 06/16/24  0430 06/16/24  0432 06/17/24  0629 06/18/24  0414 06/19/24  0404 06/20/24  0345 06/21/24  0337    133*  --   --  133* 134* 134* 141 144 142   K 3.6 3.9  --   --  3.4* 3.6 4.0 4.0 3.7 3.5   CALCIUM 9.2 8.0*  --   --  8.2* 8.2* 8.4 9.2 9.3 9.2   PHOS  --   --   --   --   --   --   --  4.5 4.3 3.6   MG 1.80  --   --   --  2.40 2.50* 2.50* 2.50 2.30 2.00   CO2 24 24  --   --  24 21 22 21* 21* 21*   BUN 34* 28*  --   --  34* 51* 62* 55.2* 48.7* 39.5*   CREATININE 1.69* 1.43*  --   --  2.60* 4.88* 5.15* 4.46* 3.38* 2.58*   EGFRNORACEVR 43 53  --   --  26 12 11 14 19 26   GLUCOSE 166* 154*  --   --  137* 110 105 104 102 106   BILITOT 1.2* 1.5*  --   --  1.1* 0.8 1.0 0.5 0.5 0.5   ALKPHOS 69 61  --   --  67 60 43* 53 55 47   ALT 35 24  --   --  24 31 34 24 22 18   AST 35 32  --   --  30 41 51 28 25 22   ALBUMIN 4.5 3.3*  --   --  3.3* 2.8* 3.3* 2.5* 2.4* 2.4*   HGBA1C  --   --   --   --   --   --   --  6.2  --   --    WBC 26.25*  --  21.52* 16.30*  --  10.34 11.60* 14.94* 9.97 7.65   HGB 15.3  --  12.7* 12.0*  --  11.9* 12.4* 12.3* 11.8* 11.5*   HCT 45.1  --  38.3 36.6  --  36.1 37.1  "38.5* 35.4* 34.8*     Nutrition Orders:  Diet Renal Non-Dialysis  Dietary nutrition supplements Boost Original Nutritional Drink - Chocolate; TID    Appetite/Oral Intake: fair/50-75% of meals  Factors Affecting Nutritional Intake: decreased appetite  Social Needs Impacting Access to Food: none identified  Food/Church/Cultural Preferences: none reported  Food Allergies: none reported  Last Bowel Movement: 24  Wound(s):     Wound 24 191 Other (comment) Left anterior;lower Leg-Tissue loss description: Not applicable leg cellulitis     Comments  () Visited pt on rounds; wife at bedside; reported pt eating better; appetite increasing; encourage oral supplement. Nephrology monitoring renal fx; improving slowly. WCN treating leg cellulitis/ no open wounds. Current diet t x appropriate.   () Pt laying in bed; wife at bedside; reported pt eating 50% or less approx 5 days; not feeling well; + leg edema; + leg cellulitis; Nephrology consulted for renal dysfunction; no HD at this time; pt on renal diet. Will order ONS for added nutrition. Wt elevated/ edema.     Anthropometrics    Height: 5' 7" (170.2 cm),    Last Weight: 115.7 kg (255 lb) (24 1419), Weight Method: Standard Scale  BMI (Calculated): 39.9  BMI Classification: obese grade II (BMI 35-39.9)        Ideal Body Weight (IBW), Male: 148 lb     % Ideal Body Weight, Male (lb): 172.3 %                 Usual Body Weight (UBW), k.8 kg  % Usual Body Weight: 108.53     Usual Weight Provided By: patient, family/caregiver, and EMR weight history    Wt Readings from Last 5 Encounters:   24 115.7 kg (255 lb)   24 115.9 kg (255 lb 9.6 oz)   14 107.8 kg (237 lb 9.6 oz)   13 100.5 kg (221 lb 9.6 oz)     Weight Change(s) Since Admission: wt elevated/ fluid   Wt Readings from Last 1 Encounters:   24 1419 115.7 kg (255 lb)   24 1324 115.7 kg (255 lb)   Admit Weight: 115.7 kg (255 lb) (24 1324), Weight Method: " Standard Scale    Estimated Needs    Weight Used For Calorie Calculations: 115.7 kg (255 lb 1.2 oz)  Energy Calorie Requirements (kcal): 2314 kcal/d; 20 sanjeev/kg  Energy Need Method: Kcal/kg  Weight Used For Protein Calculations: 67.2 kg (148 lb 2.4 oz) (IBW used protein needs)  Protein Requirements: 67 gm protein/d; 1 gm/kg IBW/ CAROLINE  Fluid Requirements (mL): 2314 ml/d; 1ml/sanjeev        Enteral Nutrition     Patient not receiving enteral nutrition at this time.    Parenteral Nutrition     Patient not receiving parenteral nutrition support at this time.    Evaluation of Received Nutrient Intake    Calories: not meeting estimated needs(6/21) oral intake improving   Protein: not meeting estimated needs    Patient Education     Education Provided: renal diet  Teaching Method: explanation and printed materials  Comprehension: verbalizes understanding  Barriers to Learning: acuity of illness  Expected Compliance: fair  Comments: All questions were answered and dietitian's contact information was provided.     Nutrition Diagnosis     PES: Inadequate oral intake related to acute illness as evidenced by eating 50% or less 5 days . (active)     PES: Moderate acute disease or injury related malnutrition related to acute illness as evidenced by less than or equal to 50% needs met for greater than or equal to 5 days and edema. (active)    Nutrition Interventions     Intervention(s): modified composition of meals/snacks, commercial beverage, multivitamin/mineral supplement therapy, purpose of nutrition education, and collaboration with other providers    Goal: Meet greater than 80% of nutritional needs by follow-up. (goal progressing)  Goal: Maintain weight throughout hospitalization. (goal progressing)    Nutrition Goals & Monitoring     Dietitian will monitor: food and beverage intake, weight, electrolyte/renal panel, and food/nutrition knowledge skill  Discharge planning: too early to determine; pending clinical course  Nutrition  Risk/Follow-Up: moderate (follow-up in 3-5 days)   Please consult if re-assessment needed sooner.

## 2024-06-21 NOTE — PROGRESS NOTES
[facility] IM Progress Note    Date of Admit: 6/18/2024  Current Hospital Day: 4     Subjective:      Brief HPI:  Reid Mcqueen is a 68 y.o. male who  has a past medical history of A-fib, Acute renal failure, Allergy, Asthma, BPH (benign prostatic hyperplasia), COPD (chronic obstructive pulmonary disease), HLD (hyperlipidemia), Hypokalemia, Kidney stone, Osteoarthritis.  He  was transferred from outside facility and [place] here for nephro services and higher level of care.  He initially presented 6/14 with left lower extremity leg swelling, redness, pain and dyspnea with generalized weakness.  This has been going on for roughly the week prior, although has been gradually worsening.  He did endorse having some nausea and 1 episode of vomiting about a week ago.  He was being treated for sepsis secondary to his lower extremity cellulitis and started on vancomycin along with Zosyn and Cleocin.  That facility  also restarted his home lisinopril despite patient having an elevated creatinine at 1.69 (unknown baseline per my records). A DVT ultrasound and a CT PE scan was done both were negative on 6/14.  He had blood cultures x2 drawn which are no growth to date.  Two days after the contrasted CT scan was done BUN/creatinine began up trending 34/2.6 -> 51/4.88 to today at  62/5.15.     Also noted patient had a minimally elevated troponin at 0.049 in an elevated pro BNP in the 3000s.  Troponin was not trended at that facility.  Patient denies any prior history of myocardial infarction, congestive heart failure.  Denies any chest pain or shortness for breath at this time.  He is comfortable on room air.    Interval hx:  No acute events overnight.  This morning BUN/creatinine is 40/2.58, Which is down from 48.7/3.38.  Patient improving well clinically.  We will bolus another L of lactated Ringer's and recheck a BMP to ensure the creatinine continues trending in the right direction and likely we will be discharging later today  home with p.o. antibiotics to finish his course to treat cellulitis.        Past Medical History:   Diagnosis Date    A-fib     Acute renal failure 06/17/2024    Allergy     Asthma     BPH (benign prostatic hyperplasia)     COPD (chronic obstructive pulmonary disease)     HLD (hyperlipidemia)     Hypokalemia 06/16/2024    Kidney stone     Osteoarthritis     Osteoporosis        Past Surgical History:   Procedure Laterality Date    CATARACT EXTRACTION, BILATERAL         Review of patient's allergies indicates:   Allergen Reactions    Cardizem [diltiazem hcl] Other (See Comments)       Current Outpatient Medications   Medication Instructions    acetaminophen (TYLENOL) 650 mg, Oral, Every 8 hours    albuterol (PROVENTIL/VENTOLIN HFA) 90 mcg/actuation inhaler 2 puffs, Inhalation, Every 6 hours PRN, Rescue    atorvastatin (LIPITOR) 40 mg, Oral, Nightly    BREO ELLIPTA 200-25 mcg/dose DsDv diskus inhaler 1 puff, Inhalation, Daily    fexofenadine (ALLEGRA) 180 mg, Oral, Daily    lisinopriL 10 mg, Oral, Daily    metoprolol succinate (TOPROL-XL) 50 mg, Oral, Daily    tamsulosin (FLOMAX) 0.4 mg Cap 1 capsule, Oral, Nightly    torsemide (DEMADEX) 20 mg, Oral, Daily    warfarin (COUMADIN) 2.5 mg, Oral, Daily        Family History    None         Tobacco Use    Smoking status: Never    Smokeless tobacco: Not on file   Substance and Sexual Activity    Alcohol use: No    Drug use: No    Sexual activity: Not Currently        Review of Systems:  Review of Systems   Constitutional:  Negative for chills and fever.   Respiratory:  Negative for cough, shortness of breath and wheezing.    Cardiovascular:  Positive for leg swelling. Negative for chest pain and palpitations.   Gastrointestinal:  Positive for nausea. Negative for abdominal pain, diarrhea and vomiting.   Genitourinary:  Negative for flank pain and hematuria.   Skin:  Negative for itching and rash.        Painful red rash LLE   Neurological:  Negative for weakness and  "headaches.   Endo/Heme/Allergies:  Does not bruise/bleed easily.          Objective:     Vital Signs:  Vital Signs (Most Recent):  Temp: 97.7 °F (36.5 °C) (06/21/24 0355)  Pulse: 74 (06/21/24 0635)  Resp: 16 (06/21/24 0635)  BP: (!) 146/86 (06/21/24 0355)  SpO2: 97 % (06/21/24 0635) Vital Signs (24h Range):  Temp:  [97.7 °F (36.5 °C)-98.4 °F (36.9 °C)] 97.7 °F (36.5 °C)  Pulse:  [55-74] 74  Resp:  [15-18] 16  SpO2:  [96 %-99 %] 97 %  BP: (144-176)/(77-93) 146/86   Body mass index is 39.94 kg/m².     Intake/Output Summary (Last 24 hours) at 6/21/2024 0704  Last data filed at 6/21/2024 0615  Gross per 24 hour   Intake 9690.19 ml   Output 1801 ml   Net 7889.19 ml       Physical Examination:  General:  Well developed, well nourished, no acute distress  HEENT: NCAT. PERRL, MMM.  Neck: supple, normal ROM, no JVD  CVS:  RRR. S1 and S2 normal. No murmurs, rubs, or gallops. Regular peripheral pulses. No peripheral edema  Resp:  Lungs clear to auscultation bilaterally, no wheezes, rales, or rhonchi. Normal respiratory effort.  GI:  Abdomen soft, non-tender, non-distended, normoactive bowel sounds  MSK:  Full range of motion, no obvious deformities.   Skin:  LLE with erythema and edema.  Significantly improved since admission.  Neuro:  Alert and oriented x3, No focal neuro deficits, CNII-XII grossly intact      Laboratory:    Recent Labs   Lab 06/21/24  0337   WBC 7.65   HGB 11.5*   HCT 34.8*      MCV 96.1*   RDW 13.7     No results for input(s): "TROPONINI", "CKTOTAL", "CKMB", "BNP" in the last 24 hours.  Recent Labs   Lab 06/14/24  1555   TROPONINI 0.049*     No results for input(s): "CHOL", "HDL", "LDLCALC", "TRIG", "CHOLHDL" in the last 168 hours. Recent Labs   Lab 06/21/24  0337      K 3.5   *   CO2 21*   BUN 39.5*   CREATININE 2.58*   CALCIUM 9.2   MG 2.00   PHOS 3.6     Recent Labs   Lab 06/21/24  0337   ALBUMIN 2.4*   BILITOT 0.5   AST 22   ALKPHOS 47   ALT 18     No results for input(s): "IRON", " ""TIBC", "FERRITIN", "SATURATEDIRO", "KFSVBLSE84", "FOLATE" in the last 168 hours.  Recent Labs   Lab 06/19/24  0404 06/20/24  0345 06/21/24  0337   HGBA1C 6.2  --   --    INR 2.0*   < > 1.6*    < > = values in this interval not displayed.          Microbiology Data:  Microbiology Results (last 7 days)       ** No results found for the last 168 hours. **             Other Results:    Radiology:    US Kidney    Result Date: 6/17/2024  1. Benign-appearing, right cortical cysts are present as described above. Electronically signed by: Jamaal Marie Date:    06/17/2024 Time:    14:06       Current Medications:     Infusions:   lactated ringers   Intravenous Continuous 75 mL/hr at 06/21/24 0615 Rate Verify at 06/21/24 0615         Scheduled:   atorvastatin  40 mg Oral QHS    cefTRIAXone (Rocephin) IV (PEDS and ADULTS)  2 g Intravenous Q24H    doxycycline IV (PEDS and ADULTS)  100 mg Intravenous Q12H    fluticasone furoate-vilanteroL  1 puff Inhalation Daily    metoprolol succinate  50 mg Oral Daily    mupirocin   Nasal BID    potassium chloride  40 mEq Oral Once    warfarin  2.5 mg Oral Daily         PRN:   atorvastatin tablet 40 mg    cefTRIAXone (ROCEPHIN) 2 g in dextrose 5 % in water (D5W) 100 mL IVPB (MB+)    doxycycline 100 mg in dextrose 5 % in water (D5W) 100 mL IVPB (MB+)    fluticasone furoate-vilanteroL 200-25 mcg/dose diskus inhaler 1 puff    metoprolol succinate (TOPROL-XL) 24 hr tablet 50 mg    mupirocin 2 % ointment    potassium chloride SA CR tablet 40 mEq    warfarin (COUMADIN) tablet 2.5 mg        Antibiotics and Day Number of Therapy:  Antibiotics (From admission, onward)      Start     Stop Route Frequency Ordered    06/19/24 1100  doxycycline 100 mg in dextrose 5 % in water (D5W) 100 mL IVPB (MB+)         -- IV Every 12 hours (non-standard times) 06/19/24 1006    06/18/24 2100  mupirocin 2 % ointment         06/23/24 2059 Nasl 2 times daily 06/18/24 1320    06/18/24 1400  cefTRIAXone (ROCEPHIN) 2 g in " dextrose 5 % in water (D5W) 100 mL IVPB (MB+)         07/02/24 1659 IV Every 24 hours (non-standard times) 06/18/24 1348                 Assessment & Plan:     Non-oliguric Acute renal failure  - patient was transferred here for Renal Services and nephrology has been consulted.  - I suspect there is some iatrogenic component at least for this patients acute renal failure. The previous facility restarted his home lisinopril and diuresed with torsemide despite patient having an elevated creatinine at 1.69 (unknown baseline per my records). A DVT ultrasound and a CT PE scan was done both were negative on 6/14.    - Two days after the contrasted CT scan was done BUN/creatinine began up trending 34/2.6 -> 51/4.88 to today at  62/5.15.   - continue IVF    LLE cellulitis  -continue Rocephin and doxycycline  - no leukocytosis today, sx improving.     pAF  Continue home warfarin. On warfarin 2/2 affordability.   PT/INR 18/1.4    Elevated pro- bnp 3000  Echocardiogram EF 55%    Wife is reporting to me the patient has a previous history of congestive heart failure.  He used to be on Entresto and Eliquis although with the had to be discontinued secondary to affordability.  He was previously on either Aldactone or Jardiance per his wife although she does not know why they have been discontinued. Wife did also report to me that patient had a non-ischemic heart cath in dec of either 2020 or 2021.   CM consult for records from [place].        Elevated parathyroid hormone 144   Deficiency of vitamin-D with a level 17  - unsure of baseline creatinine is also likely there is an element of chronic kidney disease for this patient.  - regardless parathyroid she will be followed once vitamin-D levels replete      10cm hepatic lobe cyst noted on scans.      CODE STATUS: full  Access: piv  Antibiotics: rocephin 2g q24h and doxycycline 100 mg b.i.d.  Diet: renal  DVT Prophylaxis: heparin  GI Prophylaxis: prn       Disposition: stable.   Blood cultures have been negative to date.  We will be bolusing another L of lactated Ringer's and checking a metabolic panel later today and likely discharging.        Kevin Cmapa,   Internal Medicine Resident PGY-III

## 2024-06-22 VITALS
BODY MASS INDEX: 40.02 KG/M2 | OXYGEN SATURATION: 98 % | SYSTOLIC BLOOD PRESSURE: 145 MMHG | HEART RATE: 70 BPM | WEIGHT: 255 LBS | HEIGHT: 67 IN | DIASTOLIC BLOOD PRESSURE: 75 MMHG | RESPIRATION RATE: 18 BRPM | TEMPERATURE: 98 F

## 2024-06-22 LAB
ALBUMIN SERPL-MCNC: 2.5 G/DL (ref 3.4–4.8)
ALBUMIN/GLOB SERPL: 0.6 RATIO (ref 1.1–2)
ALP SERPL-CCNC: 48 UNIT/L (ref 40–150)
ALT SERPL-CCNC: 18 UNIT/L (ref 0–55)
ANION GAP SERPL CALC-SCNC: 7 MEQ/L
AST SERPL-CCNC: 21 UNIT/L (ref 5–34)
BASOPHILS # BLD AUTO: 0.03 X10(3)/MCL
BASOPHILS NFR BLD AUTO: 0.4 %
BILIRUB SERPL-MCNC: 0.5 MG/DL
BUN SERPL-MCNC: 29.5 MG/DL (ref 8.4–25.7)
CALCIUM SERPL-MCNC: 9.5 MG/DL (ref 8.8–10)
CHLORIDE SERPL-SCNC: 114 MMOL/L (ref 98–107)
CO2 SERPL-SCNC: 21 MMOL/L (ref 23–31)
CREAT SERPL-MCNC: 2.23 MG/DL (ref 0.73–1.18)
CREAT/UREA NIT SERPL: 13
EOSINOPHIL # BLD AUTO: 0 X10(3)/MCL (ref 0–0.9)
EOSINOPHIL NFR BLD AUTO: 0 %
ERYTHROCYTE [DISTWIDTH] IN BLOOD BY AUTOMATED COUNT: 13.7 % (ref 11.5–17)
GFR SERPLBLD CREATININE-BSD FMLA CKD-EPI: 31 ML/MIN/1.73/M2
GLOBULIN SER-MCNC: 4.4 GM/DL (ref 2.4–3.5)
GLUCOSE SERPL-MCNC: 99 MG/DL (ref 82–115)
HCT VFR BLD AUTO: 35.4 % (ref 42–52)
HGB BLD-MCNC: 11.7 G/DL (ref 14–18)
HOLD SPECIMEN: NORMAL
HOLD SPECIMEN: NORMAL
IMM GRANULOCYTES # BLD AUTO: 0.44 X10(3)/MCL (ref 0–0.04)
IMM GRANULOCYTES NFR BLD AUTO: 6.3 %
INR PPP: 1.6
LYMPHOCYTES # BLD AUTO: 1.34 X10(3)/MCL (ref 0.6–4.6)
LYMPHOCYTES NFR BLD AUTO: 19.3 %
MCH RBC QN AUTO: 31.4 PG (ref 27–31)
MCHC RBC AUTO-ENTMCNC: 33.1 G/DL (ref 33–36)
MCV RBC AUTO: 94.9 FL (ref 80–94)
MONOCYTES # BLD AUTO: 0.76 X10(3)/MCL (ref 0.1–1.3)
MONOCYTES NFR BLD AUTO: 11 %
NEUTROPHILS # BLD AUTO: 4.37 X10(3)/MCL (ref 2.1–9.2)
NEUTROPHILS NFR BLD AUTO: 63 %
NRBC BLD AUTO-RTO: 0 %
PLATELET # BLD AUTO: 306 X10(3)/MCL (ref 130–400)
PMV BLD AUTO: 9.4 FL (ref 7.4–10.4)
POTASSIUM SERPL-SCNC: 3.8 MMOL/L (ref 3.5–5.1)
PROT SERPL-MCNC: 6.9 GM/DL (ref 5.8–7.6)
PROTHROMBIN TIME: 18.9 SECONDS (ref 11.4–14)
RBC # BLD AUTO: 3.73 X10(6)/MCL (ref 4.7–6.1)
SODIUM SERPL-SCNC: 142 MMOL/L (ref 136–145)
WBC # BLD AUTO: 6.94 X10(3)/MCL (ref 4.5–11.5)

## 2024-06-22 PROCEDURE — 85610 PROTHROMBIN TIME: CPT

## 2024-06-22 PROCEDURE — 63600175 PHARM REV CODE 636 W HCPCS: Performed by: NURSE PRACTITIONER

## 2024-06-22 PROCEDURE — 63600175 PHARM REV CODE 636 W HCPCS

## 2024-06-22 PROCEDURE — 25000003 PHARM REV CODE 250

## 2024-06-22 PROCEDURE — 85025 COMPLETE CBC W/AUTO DIFF WBC: CPT

## 2024-06-22 PROCEDURE — 80053 COMPREHEN METABOLIC PANEL: CPT

## 2024-06-22 PROCEDURE — 36415 COLL VENOUS BLD VENIPUNCTURE: CPT

## 2024-06-22 PROCEDURE — 25000003 PHARM REV CODE 250: Performed by: NURSE PRACTITIONER

## 2024-06-22 PROCEDURE — 94640 AIRWAY INHALATION TREATMENT: CPT

## 2024-06-22 PROCEDURE — 36415 COLL VENOUS BLD VENIPUNCTURE: CPT | Performed by: STUDENT IN AN ORGANIZED HEALTH CARE EDUCATION/TRAINING PROGRAM

## 2024-06-22 RX ORDER — AMLODIPINE BESYLATE 10 MG/1
10 TABLET ORAL DAILY
Status: DISCONTINUED | OUTPATIENT
Start: 2024-06-23 | End: 2024-06-22 | Stop reason: HOSPADM

## 2024-06-22 RX ORDER — PROMETHAZINE HYDROCHLORIDE 25 MG/ML
25 INJECTION, SOLUTION INTRAMUSCULAR; INTRAVENOUS ONCE
Status: COMPLETED | OUTPATIENT
Start: 2024-06-22 | End: 2024-06-22

## 2024-06-22 RX ORDER — AMLODIPINE BESYLATE 10 MG/1
10 TABLET ORAL DAILY
Qty: 90 TABLET | Refills: 3 | Status: SHIPPED | OUTPATIENT
Start: 2024-06-23 | End: 2025-06-23

## 2024-06-22 RX ADMIN — MUPIROCIN: 20 OINTMENT TOPICAL at 10:06

## 2024-06-22 RX ADMIN — ACETAMINOPHEN 650 MG: 325 TABLET, FILM COATED ORAL at 04:06

## 2024-06-22 RX ADMIN — FLUTICASONE FUROATE AND VILANTEROL TRIFENATATE 1 PUFF: 200; 25 POWDER RESPIRATORY (INHALATION) at 08:06

## 2024-06-22 RX ADMIN — METOPROLOL SUCCINATE 50 MG: 50 TABLET, FILM COATED, EXTENDED RELEASE ORAL at 09:06

## 2024-06-22 RX ADMIN — PROMETHAZINE HYDROCHLORIDE 25 MG: 25 INJECTION INTRAMUSCULAR; INTRAVENOUS at 05:06

## 2024-06-22 RX ADMIN — TAMSULOSIN HYDROCHLORIDE 0.4 MG: 0.4 CAPSULE ORAL at 09:06

## 2024-06-22 RX ADMIN — DOXYCYCLINE HYCLATE 100 MG: 100 TABLET, COATED ORAL at 09:06

## 2024-06-22 RX ADMIN — CEFTRIAXONE SODIUM 2 G: 2 INJECTION, POWDER, FOR SOLUTION INTRAMUSCULAR; INTRAVENOUS at 09:06

## 2024-06-22 RX ADMIN — ACETAMINOPHEN 650 MG: 325 TABLET, FILM COATED ORAL at 09:06

## 2024-06-22 RX ADMIN — HYDRALAZINE HYDROCHLORIDE 10 MG: 20 INJECTION INTRAMUSCULAR; INTRAVENOUS at 03:06

## 2024-06-22 RX ADMIN — SODIUM CHLORIDE, POTASSIUM CHLORIDE, SODIUM LACTATE AND CALCIUM CHLORIDE: 600; 310; 30; 20 INJECTION, SOLUTION INTRAVENOUS at 05:06

## 2024-06-22 NOTE — NURSING
Patient admitted with CAROLINE and cellulitis of the LLE and is now ready for discharge. He will continue his course of antibiotics and follow up with his PCP in 1 week.

## 2024-06-25 NOTE — PHYSICIAN QUERY
Please further specify the heart failure diagnosis.     Chronic Diastolic Heart Failure (HFpEF)

## 2024-06-25 NOTE — PHYSICIAN QUERY
Please further specify the malnutrition diagnosis associated with the below clinical findings.   Moderate Protein Calorie Malnutrition

## 2024-07-26 ENCOUNTER — LAB VISIT (OUTPATIENT)
Dept: LAB | Facility: HOSPITAL | Age: 69
End: 2024-07-26
Attending: INTERNAL MEDICINE
Payer: MEDICARE

## 2024-07-26 DIAGNOSIS — N18.9 CHRONIC KIDNEY DISEASE, UNSPECIFIED: ICD-10-CM

## 2024-07-26 DIAGNOSIS — I10 HYPERTENSION, UNSPECIFIED TYPE: Primary | ICD-10-CM

## 2024-07-26 DIAGNOSIS — N17.9 ACUTE KIDNEY FAILURE, UNSPECIFIED: ICD-10-CM

## 2024-07-26 DIAGNOSIS — R73.9 BLOOD GLUCOSE ELEVATED: ICD-10-CM

## 2024-07-26 DIAGNOSIS — R60.9 EDEMA, UNSPECIFIED TYPE: ICD-10-CM

## 2024-07-26 LAB
ALBUMIN SERPL-MCNC: 4.4 G/DL (ref 3.4–5)
BUN SERPL-MCNC: 23 MG/DL (ref 7–20)
CALCIUM SERPL-MCNC: 10.1 MG/DL (ref 8.4–10.2)
CHLORIDE SERPL-SCNC: 106 MMOL/L (ref 98–110)
CO2 SERPL-SCNC: 23 MMOL/L (ref 21–32)
CREAT SERPL-MCNC: 1.08 MG/DL (ref 0.66–1.25)
CREAT UR-MCNC: 37.7 MG/DL (ref 63–166)
CREAT UR-MCNC: 42.1 MG/DL
CREAT/UREA NIT SERPL: 21 (ref 12–20)
EST. AVERAGE GLUCOSE BLD GHB EST-MCNC: 125.5 MG/DL (ref 70–115)
GFR SERPLBLD CREATININE-BSD FMLA CKD-EPI: 74 ML/MIN/1.73/M2
GLUCOSE SERPL-MCNC: 126 MG/DL (ref 70–115)
HBA1C MFR BLD: 6 % (ref 4–6)
MICROALBUMIN UR-MCNC: <5 UG/ML
MICROALBUMIN/CREAT RATIO PNL UR: ABNORMAL
PHOSPHATE SERPL-MCNC: 3.6 MG/DL (ref 2.5–4.9)
POTASSIUM SERPL-SCNC: 4.6 MMOL/L (ref 3.5–5.1)
PROT UR STRIP-MCNC: 9 MG/DL
SODIUM SERPL-SCNC: 139 MMOL/L (ref 136–145)

## 2024-07-26 PROCEDURE — 83036 HEMOGLOBIN GLYCOSYLATED A1C: CPT

## 2024-07-26 PROCEDURE — 84156 ASSAY OF PROTEIN URINE: CPT

## 2024-07-26 PROCEDURE — 36415 COLL VENOUS BLD VENIPUNCTURE: CPT

## 2024-07-26 PROCEDURE — 82043 UR ALBUMIN QUANTITATIVE: CPT

## 2024-07-26 PROCEDURE — 80069 RENAL FUNCTION PANEL: CPT

## 2024-07-26 PROCEDURE — 82570 ASSAY OF URINE CREATININE: CPT

## 2024-09-16 PROBLEM — R65.20 SEVERE SEPSIS: Status: RESOLVED | Noted: 2024-06-14 | Resolved: 2024-09-16

## 2024-09-16 PROBLEM — A41.9 SEVERE SEPSIS: Status: RESOLVED | Noted: 2024-06-14 | Resolved: 2024-09-16

## 2024-09-23 PROBLEM — N17.9 ACUTE RENAL FAILURE: Status: RESOLVED | Noted: 2024-06-17 | Resolved: 2024-09-23

## 2024-10-24 ENCOUNTER — LAB VISIT (OUTPATIENT)
Dept: LAB | Facility: HOSPITAL | Age: 69
End: 2024-10-24
Attending: INTERNAL MEDICINE
Payer: MEDICARE

## 2024-10-24 DIAGNOSIS — I13.10 HYPERTENSIVE HEART AND RENAL DISEASE: Primary | ICD-10-CM

## 2024-10-24 LAB
ANION GAP SERPL CALC-SCNC: 8 MEQ/L (ref 2–13)
BUN SERPL-MCNC: 23 MG/DL (ref 7–20)
CALCIUM SERPL-MCNC: 9.8 MG/DL (ref 8.4–10.2)
CHLORIDE SERPL-SCNC: 107 MMOL/L (ref 98–110)
CO2 SERPL-SCNC: 25 MMOL/L (ref 21–32)
CREAT SERPL-MCNC: 0.99 MG/DL (ref 0.66–1.25)
CREAT/UREA NIT SERPL: 23 (ref 12–20)
GFR SERPLBLD CREATININE-BSD FMLA CKD-EPI: 82 ML/MIN/1.73/M2
GLUCOSE SERPL-MCNC: 117 MG/DL (ref 70–115)
POTASSIUM SERPL-SCNC: 4.3 MMOL/L (ref 3.5–5.1)
SODIUM SERPL-SCNC: 140 MMOL/L (ref 136–145)

## 2024-10-24 PROCEDURE — 80048 BASIC METABOLIC PNL TOTAL CA: CPT

## 2024-10-24 PROCEDURE — 36415 COLL VENOUS BLD VENIPUNCTURE: CPT

## 2024-11-07 ENCOUNTER — LAB VISIT (OUTPATIENT)
Dept: LAB | Facility: HOSPITAL | Age: 69
End: 2024-11-07
Attending: INTERNAL MEDICINE
Payer: MEDICARE

## 2024-11-07 DIAGNOSIS — N20.0 URIC ACID NEPHROLITHIASIS: ICD-10-CM

## 2024-11-07 DIAGNOSIS — N17.9 ACUTE KIDNEY FAILURE, UNSPECIFIED: Primary | ICD-10-CM

## 2024-11-07 DIAGNOSIS — N18.9 CHRONIC KIDNEY DISEASE, UNSPECIFIED: ICD-10-CM

## 2024-11-07 DIAGNOSIS — N25.81 SECONDARY HYPERPARATHYROIDISM OF RENAL ORIGIN: ICD-10-CM

## 2024-11-07 LAB
25(OH)D3+25(OH)D2 SERPL-MCNC: 47 NG/ML (ref 30–80)
ALBUMIN SERPL-MCNC: 4.5 G/DL (ref 3.4–5)
BASOPHILS # BLD AUTO: 0.01 X10(3)/MCL (ref 0.01–0.08)
BASOPHILS NFR BLD AUTO: 0.2 % (ref 0.1–1.2)
BILIRUB UR QL STRIP.AUTO: NEGATIVE
BUN SERPL-MCNC: 29 MG/DL (ref 7–20)
CALCIUM SERPL-MCNC: 10.1 MG/DL (ref 8.4–10.2)
CALCIUM SERPL-MCNC: 10.2 MG/DL (ref 8.4–10.2)
CHLORIDE SERPL-SCNC: 106 MMOL/L (ref 98–110)
CLARITY UR: CLEAR
CO2 SERPL-SCNC: 26 MMOL/L (ref 21–32)
COLOR UR AUTO: YELLOW
CREAT SERPL-MCNC: 1.11 MG/DL (ref 0.66–1.25)
CREAT UR-MCNC: 104.6 MG/DL
CREAT/UREA NIT SERPL: 26 (ref 12–20)
EOSINOPHIL # BLD AUTO: 0 X10(3)/MCL (ref 0.04–0.54)
EOSINOPHIL NFR BLD AUTO: 0 % (ref 0.7–7)
ERYTHROCYTE [DISTWIDTH] IN BLOOD BY AUTOMATED COUNT: 12.6 %
GFR SERPLBLD CREATININE-BSD FMLA CKD-EPI: 71 ML/MIN/1.73/M2
GLUCOSE SERPL-MCNC: 140 MG/DL (ref 70–115)
GLUCOSE UR QL STRIP: NEGATIVE
HCT VFR BLD AUTO: 44.5 % (ref 36–52)
HGB BLD-MCNC: 14.6 G/DL (ref 13–18)
HGB UR QL STRIP: NEGATIVE
IMM GRANULOCYTES # BLD AUTO: 0.03 X10(3)/MCL (ref 0–0.03)
IMM GRANULOCYTES NFR BLD AUTO: 0.5 % (ref 0–0.5)
KETONES UR QL STRIP: NEGATIVE
LEUKOCYTE ESTERASE UR QL STRIP: NEGATIVE
LYMPHOCYTES # BLD AUTO: 1.62 X10(3)/MCL (ref 1.32–3.57)
LYMPHOCYTES NFR BLD AUTO: 26.8 % (ref 20–55)
MCH RBC QN AUTO: 30.4 PG (ref 27–34)
MCHC RBC AUTO-ENTMCNC: 32.8 G/DL (ref 31–37)
MCV RBC AUTO: 92.7 FL (ref 79–99)
MONOCYTES # BLD AUTO: 0.48 X10(3)/MCL (ref 0.3–0.82)
MONOCYTES NFR BLD AUTO: 7.9 % (ref 4.7–12.5)
NEUTROPHILS # BLD AUTO: 3.9 X10(3)/MCL (ref 1.78–5.38)
NEUTROPHILS NFR BLD AUTO: 64.6 % (ref 37–73)
NITRITE UR QL STRIP: NEGATIVE
NRBC BLD AUTO-RTO: 0 %
PH UR STRIP: 6 [PH]
PHOSPHATE SERPL-MCNC: 3.4 MG/DL (ref 2.5–4.9)
PLATELET # BLD AUTO: 214 X10(3)/MCL (ref 140–371)
PMV BLD AUTO: 10.2 FL (ref 9.4–12.4)
POTASSIUM SERPL-SCNC: 4.3 MMOL/L (ref 3.5–5.1)
PROT UR QL STRIP: NEGATIVE
PROT UR STRIP-MCNC: <5 MG/DL
PTH-INTACT SERPL-MCNC: 60.7 PG/ML (ref 14–73)
RBC # BLD AUTO: 4.8 X10(6)/MCL (ref 4–6)
SODIUM SERPL-SCNC: 142 MMOL/L (ref 136–145)
SP GR UR STRIP.AUTO: 1.02 (ref 1–1.03)
UROBILINOGEN UR STRIP-ACNC: 0.2
WBC # BLD AUTO: 6.04 X10(3)/MCL (ref 4–11.5)

## 2024-11-07 PROCEDURE — 84156 ASSAY OF PROTEIN URINE: CPT

## 2024-11-07 PROCEDURE — 81003 URINALYSIS AUTO W/O SCOPE: CPT

## 2024-11-07 PROCEDURE — 85025 COMPLETE CBC W/AUTO DIFF WBC: CPT

## 2024-11-07 PROCEDURE — 83970 ASSAY OF PARATHORMONE: CPT

## 2024-11-07 PROCEDURE — 82306 VITAMIN D 25 HYDROXY: CPT

## 2024-11-07 PROCEDURE — 80069 RENAL FUNCTION PANEL: CPT

## 2024-11-07 PROCEDURE — 82570 ASSAY OF URINE CREATININE: CPT

## 2024-11-07 PROCEDURE — 36415 COLL VENOUS BLD VENIPUNCTURE: CPT

## 2025-01-06 PROBLEM — M17.0 BILATERAL PRIMARY OSTEOARTHRITIS OF KNEE: Status: ACTIVE | Noted: 2025-01-06

## 2025-01-24 ENCOUNTER — HOSPITAL ENCOUNTER (OUTPATIENT)
Dept: RADIOLOGY | Facility: HOSPITAL | Age: 70
Discharge: HOME | End: 2025-01-24
Attending: INTERNAL MEDICINE
Payer: MEDICARE

## 2025-01-24 DIAGNOSIS — Z01.818 PRE-OP EXAM: ICD-10-CM

## 2025-01-24 PROCEDURE — 71046 X-RAY EXAM CHEST 2 VIEWS: CPT | Mod: TC

## 2025-02-05 NOTE — H&P (VIEW-ONLY)
.    HPI:   Reid Mcqueen is a 69 y.o. male who presents today for right knee pain. Patient states he is still having pain in the knee and when the leg swells he is still experiencing some numbness.  He does have improvement with the edema using the Jobst pump.      Patient was last evaluated on 1/6/25.     Patient Active Problem List   Diagnosis    Hepatic cyst    Cellulitis of left lower extremity    Hypokalemia    Malnutrition    Bilateral primary osteoarthritis of knee       Past Medical History:   Diagnosis Date    A-fib     Acute renal failure 06/17/2024    Allergy     Asthma     BPH (benign prostatic hyperplasia)     COPD (chronic obstructive pulmonary disease)     HLD (hyperlipidemia)     Hypokalemia 06/16/2024    Kidney stone     Osteoarthritis     Osteoporosis        Past Surgical History:   Procedure Laterality Date    ARTHROSCOPY OF BOTH KNEES      CATARACT EXTRACTION, BILATERAL           Current Outpatient Medications:     acetaminophen (TYLENOL) 650 MG TbSR, Take 650 mg by mouth every 8 (eight) hours., Disp: , Rfl:     albuterol (PROVENTIL/VENTOLIN HFA) 90 mcg/actuation inhaler, Inhale 2 puffs into the lungs every 6 (six) hours as needed for Wheezing. Rescue, Disp: , Rfl:     amLODIPine (NORVASC) 10 MG tablet, Take 1 tablet (10 mg total) by mouth once daily., Disp: 90 tablet, Rfl: 3    atorvastatin (LIPITOR) 40 MG tablet, Take 40 mg by mouth every evening., Disp: , Rfl:     BREO ELLIPTA 200-25 mcg/dose DsDv diskus inhaler, Inhale 1 puff into the lungs once daily., Disp: , Rfl:     fexofenadine (ALLEGRA) 180 MG tablet, Take 180 mg by mouth once daily., Disp: , Rfl:     metoprolol succinate (TOPROL-XL) 50 MG 24 hr tablet, Take 50 mg by mouth once daily., Disp: , Rfl:     tamsulosin (FLOMAX) 0.4 mg Cap, Take 1 capsule by mouth nightly., Disp: , Rfl:     warfarin (COUMADIN) 2.5 MG tablet, Take 2.5 mg by mouth Daily., Disp: , Rfl:     Review of patient's allergies indicates:   Allergen Reactions  "   Cardizem [diltiazem hcl] Other (See Comments)       Social History     Tobacco Use    Smoking status: Never   Substance Use Topics    Alcohol use: No    Drug use: No       No family history on file.    Review of Systems  Constitutional- reports no fever, fatigue  Eye- no vision loss  ENMT- no sore throat, ear pain, sinus pain/congestion, nasal congestion/drainage  Respiratory- no cough or shortness of breath  CV- no chest pain, no palpitations  GI- no N/V/D; no abdominal pain    Ht 5' 7" (1.702 m)   Wt 120.1 kg (264 lb 12.4 oz)   BMI 41.47 kg/m²     Physical exam:  Right Knee Exam     Tenderness   The patient is experiencing tenderness in the medial joint line and lateral joint line.    Range of Motion   Extension:  5 (0)   Right knee flexion: 115.     Other   Erythema: absent  Scars: absent  Sensation: normal  Pulse: present  Swelling: mild    Comments:  Varus alignment      Left Knee Exam     Tenderness   The patient is experiencing tenderness in the medial joint line and lateral joint line.    Range of Motion   Extension:  5 (0)   Flexion:  110 (110)     Other   Erythema: present  Scars: absent  Sensation: normal  Pulse: present  Swelling: moderate    Comments:  Venous stasis changes.        Neurological Exam    Gait   Unable to rise from chair without using arms. Timed get up and go test: 10 seconds.  Severe antalgic gait  Severe limp .     Imaging:  No new imaging performed today.     Other records reviewed:  None    Assessment/Plan:  Reid Mcqeuen is a 69 y.o. male who presents today with MAXWELL knee pain.  He has a advanced DJD of both knees.  His right knee is severely painful and he was failed conservative management.  He basically is optimized this point in time and we should proceed with his right total knee replacement.  He was to continue using his Jobst pumps and current medication for edema.  He was going to discontinue Coumadin 5 days prior to surgery.    Right robotic total knee arthroplasty " with a medial stabilized tibia.      Primary osteoarthritis of right knee  -     Place in Outpatient; Standing  -     Case Request Operating Room: ROBOTIC ARTHROPLASTY, KNEE, TOTAL  -     Vital signs; Standing  -     Insert peripheral IV; Standing  -     Clip and Prep Right Anterior Knee Mid Thigh to Mid Calf; Standing  -     Cleanse with Chlorhexidine (CHG); Standing  -     Diet NPO; Standing  -     Place FINN hose; Standing  -     Place sequential compression device; Standing  -     Chlorohexidine Gluconate Bath; Standing  -     Culture, MRSA; Standing  -     CBC auto differential; Future; Expected date: 02/05/2025  -     Comprehensive metabolic panel; Future; Expected date: 02/05/2025  -     Urinalysis; Future; Expected date: 02/05/2025  -     Urine culture; Future; Expected date: 02/05/2025  -     Protime-INR; Future; Expected date: 02/05/2025  -     APTT; Future; Expected date: 02/05/2025  -     Sedimentation rate; Future; Expected date: 02/05/2025  -     C-reactive protein; Future; Expected date: 02/05/2025  -     X-Ray Chest PA And Lateral; Future; Expected date: 02/05/2025  -     EKG 12-lead; Future  -     Inpatient consult to Anesthesiology; Standing    Other orders  -     lactated ringers infusion  -     IP VTE LOW RISK PATIENT; Standing  -     ceFAZolin (Ancef) 2 g in D5W 50 mL IVPB  -     mupirocin 2 % ointment  -     tranexamic acid (CYKLOKAPRON) 1,000 mg in 0.9% NaCl 100 mL

## 2025-02-10 ENCOUNTER — CLINICAL SUPPORT (OUTPATIENT)
Dept: RESPIRATORY THERAPY | Facility: HOSPITAL | Age: 70
End: 2025-02-10
Attending: SPECIALIST
Payer: MEDICARE

## 2025-02-10 DIAGNOSIS — M17.11 PRIMARY OSTEOARTHRITIS OF RIGHT KNEE: ICD-10-CM

## 2025-02-10 LAB
OHS QRS DURATION: 152 MS
OHS QTC CALCULATION: 443 MS

## 2025-02-10 PROCEDURE — 93005 ELECTROCARDIOGRAM TRACING: CPT

## 2025-02-10 PROCEDURE — 93010 ELECTROCARDIOGRAM REPORT: CPT | Mod: ,,, | Performed by: INTERNAL MEDICINE

## 2025-02-10 RX ORDER — CHOLECALCIFEROL (VITAMIN D3) 25 MCG
2000 TABLET ORAL EVERY MORNING
COMMUNITY

## 2025-02-10 RX ORDER — BUDESONIDE AND FORMOTEROL FUMARATE DIHYDRATE 160; 4.5 UG/1; UG/1
2 AEROSOL RESPIRATORY (INHALATION) EVERY 12 HOURS
COMMUNITY

## 2025-02-10 RX ORDER — HYDRALAZINE HYDROCHLORIDE 25 MG/1
25 TABLET, FILM COATED ORAL 3 TIMES DAILY
COMMUNITY

## 2025-02-10 RX ORDER — TORSEMIDE 20 MG/1
20 TABLET ORAL
COMMUNITY

## 2025-02-10 RX ORDER — CARBOXYMETHYLCELLULOSE SODIUM 5 MG/ML
1 SOLUTION/ DROPS OPHTHALMIC EVERY MORNING
COMMUNITY

## 2025-02-10 NOTE — OR NURSING
From: Dalia Lopez  To: NERISSA Payne  Sent: 2/27/2020 10:49 AM CST  Subject: Other    Hi Dr. Betts,     I'm going to have a tattoo removed and the provider indicated they recommend asking you for a prescription for a topical lidocaine to apply to my skin to decrease the pain during the procedure(s). My first session of four for the removal is scheduled for next Friday, March 6. Is this something you can call in for me as the less pain the better in this case!     Thank you in advance... Dalia Christianson   Instructions given for chg wipes-patient voices understanding

## 2025-02-10 NOTE — DISCHARGE INSTRUCTIONS
Tuesday 2-11-25 you will receive a phone call between 1:00 and 4:00 pm with your arrival time--stop Warfarin (done 2-7-25) as per dr morales --g wipes as instructed-nothing to eat or drink after midnight--Wednesday 2-12-25 before leaving home take hydralazine and metoprolol with small sip of water--must have  upon discharge

## 2025-02-11 ENCOUNTER — ANESTHESIA EVENT (OUTPATIENT)
Dept: SURGERY | Facility: HOSPITAL | Age: 70
End: 2025-02-11
Payer: MEDICARE

## 2025-02-11 NOTE — ANESTHESIA PREPROCEDURE EVALUATION
02/11/2025  Reid Mcqueen is a 69 y.o., male.      Pre-op Assessment    I have reviewed the Patient Summary Reports.     I have reviewed the Nursing Notes. I have reviewed the NPO Status.   I have reviewed the Medications.     Review of Systems  Anesthesia Hx:             Denies Family Hx of Anesthesia complications.    Denies Personal Hx of Anesthesia complications.                    Social:  No Alcohol Use, Non-Smoker       Cardiovascular:  Cardiovascular Normal                  ECG has been reviewed. H/o afib                           Pulmonary:   COPD, moderate Asthma moderate                   Renal/:  Chronic Renal Disease, CKD                Hepatic/GI:      Liver Disease,               Musculoskeletal:  Arthritis               Neurological:  Neurology Normal                                      Endocrine:  Endocrine Normal          Morbid Obesity / BMI > 40  Dermatological:  Skin Normal    Psych:  Psychiatric Normal                    Physical Exam  General: Cooperative, Alert and Oriented    Airway:  Mallampati: II   Mouth Opening: Normal  TM Distance: Normal  Tongue: Normal  Neck ROM: Normal ROM    Dental:  Intact        Anesthesia Plan  Type of Anesthesia, risks & benefits discussed:    Anesthesia Type: Gen ETT  Intra-op Monitoring Plan: Standard ASA Monitors  Post Op Pain Control Plan: multimodal analgesia  Induction:  IV  Informed Consent: Informed consent signed with the Patient and all parties understand the risks and agree with anesthesia plan.  All questions answered. Patient consented to blood products? Yes  ASA Score: 3    Ready For Surgery From Anesthesia Perspective.     .

## 2025-02-12 ENCOUNTER — HOSPITAL ENCOUNTER (INPATIENT)
Facility: HOSPITAL | Age: 70
LOS: 1 days | Discharge: NURSING, REHAB OR LONG-TERM CARE | DRG: 470 | End: 2025-02-14
Attending: SPECIALIST | Admitting: SPECIALIST
Payer: MEDICARE

## 2025-02-12 ENCOUNTER — ANESTHESIA (OUTPATIENT)
Dept: SURGERY | Facility: HOSPITAL | Age: 70
End: 2025-02-12
Payer: MEDICARE

## 2025-02-12 DIAGNOSIS — M17.11 PRIMARY OSTEOARTHRITIS OF RIGHT KNEE: Primary | ICD-10-CM

## 2025-02-12 PROCEDURE — 71000015 HC POSTOP RECOV 1ST HR: Performed by: SPECIALIST

## 2025-02-12 PROCEDURE — 71000016 HC POSTOP RECOV ADDL HR: Performed by: SPECIALIST

## 2025-02-12 PROCEDURE — 63600175 PHARM REV CODE 636 W HCPCS: Mod: JZ,TB | Performed by: PHYSICIAN ASSISTANT

## 2025-02-12 PROCEDURE — 25000003 PHARM REV CODE 250: Performed by: PHYSICIAN ASSISTANT

## 2025-02-12 PROCEDURE — 97116 GAIT TRAINING THERAPY: CPT

## 2025-02-12 PROCEDURE — C1729 CATH, DRAINAGE: HCPCS | Performed by: SPECIALIST

## 2025-02-12 PROCEDURE — G0378 HOSPITAL OBSERVATION PER HR: HCPCS

## 2025-02-12 PROCEDURE — 0SRC0JA REPLACEMENT OF RIGHT KNEE JOINT WITH SYNTHETIC SUBSTITUTE, UNCEMENTED, OPEN APPROACH: ICD-10-PCS | Performed by: SPECIALIST

## 2025-02-12 PROCEDURE — 36000712 HC OR TIME LEV V 1ST 15 MIN: Performed by: SPECIALIST

## 2025-02-12 PROCEDURE — 94799 UNLISTED PULMONARY SVC/PX: CPT | Mod: XB

## 2025-02-12 PROCEDURE — 71000033 HC RECOVERY, INTIAL HOUR: Performed by: SPECIALIST

## 2025-02-12 PROCEDURE — 25000003 PHARM REV CODE 250: Performed by: SPECIALIST

## 2025-02-12 PROCEDURE — 94761 N-INVAS EAR/PLS OXIMETRY MLT: CPT

## 2025-02-12 PROCEDURE — 64447 NJX AA&/STRD FEMORAL NRV IMG: CPT | Performed by: NURSE ANESTHETIST, CERTIFIED REGISTERED

## 2025-02-12 PROCEDURE — C1769 GUIDE WIRE: HCPCS | Performed by: SPECIALIST

## 2025-02-12 PROCEDURE — 63600175 PHARM REV CODE 636 W HCPCS: Performed by: SPECIALIST

## 2025-02-12 PROCEDURE — 63600175 PHARM REV CODE 636 W HCPCS: Performed by: PHYSICIAN ASSISTANT

## 2025-02-12 PROCEDURE — 25000003 PHARM REV CODE 250: Performed by: NURSE ANESTHETIST, CERTIFIED REGISTERED

## 2025-02-12 PROCEDURE — 99900035 HC TECH TIME PER 15 MIN (STAT)

## 2025-02-12 PROCEDURE — 27201423 OPTIME MED/SURG SUP & DEVICES STERILE SUPPLY: Performed by: SPECIALIST

## 2025-02-12 PROCEDURE — 63600175 PHARM REV CODE 636 W HCPCS: Performed by: NURSE ANESTHETIST, CERTIFIED REGISTERED

## 2025-02-12 PROCEDURE — 37000008 HC ANESTHESIA 1ST 15 MINUTES: Performed by: SPECIALIST

## 2025-02-12 PROCEDURE — 37000009 HC ANESTHESIA EA ADD 15 MINS: Performed by: SPECIALIST

## 2025-02-12 PROCEDURE — 97161 PT EVAL LOW COMPLEX 20 MIN: CPT

## 2025-02-12 PROCEDURE — 88300 SURGICAL PATH GROSS: CPT | Performed by: SPECIALIST

## 2025-02-12 PROCEDURE — C1776 JOINT DEVICE (IMPLANTABLE): HCPCS | Performed by: SPECIALIST

## 2025-02-12 PROCEDURE — 63600175 PHARM REV CODE 636 W HCPCS: Performed by: ANESTHESIOLOGY

## 2025-02-12 PROCEDURE — 36000713 HC OR TIME LEV V EA ADD 15 MIN: Performed by: SPECIALIST

## 2025-02-12 PROCEDURE — 25000003 PHARM REV CODE 250: Performed by: ANESTHESIOLOGY

## 2025-02-12 DEVICE — ATTUNE KNEE SYSTEM FEMORAL POROCOAT CRUCIATE RETAINING SIZE 7 RIGHT CEMENTLESS
Type: IMPLANTABLE DEVICE | Site: KNEE | Status: FUNCTIONAL
Brand: ATTUNE

## 2025-02-12 DEVICE — ATTUNE KNEE SYSTEM TIBIAL BASE AFFIXIUM FIXED BEARING SIZE 7
Type: IMPLANTABLE DEVICE | Site: KNEE | Status: FUNCTIONAL
Brand: ATTUNE AFFIXIUM

## 2025-02-12 DEVICE — IMPLANTABLE DEVICE: Type: IMPLANTABLE DEVICE | Site: KNEE | Status: FUNCTIONAL

## 2025-02-12 RX ORDER — ALBUTEROL SULFATE 90 UG/1
2 INHALANT RESPIRATORY (INHALATION) EVERY 6 HOURS PRN
Status: DISCONTINUED | OUTPATIENT
Start: 2025-02-12 | End: 2025-02-14 | Stop reason: HOSPADM

## 2025-02-12 RX ORDER — BUPIVACAINE HYDROCHLORIDE 2.5 MG/ML
INJECTION, SOLUTION EPIDURAL; INFILTRATION; INTRACAUDAL
Status: DISCONTINUED | OUTPATIENT
Start: 2025-02-12 | End: 2025-02-12

## 2025-02-12 RX ORDER — KETOROLAC TROMETHAMINE 30 MG/ML
15 INJECTION, SOLUTION INTRAMUSCULAR; INTRAVENOUS EVERY 8 HOURS
Status: DISCONTINUED | OUTPATIENT
Start: 2025-02-12 | End: 2025-02-12

## 2025-02-12 RX ORDER — ACETAMINOPHEN 500 MG
1000 TABLET ORAL
Status: COMPLETED | OUTPATIENT
Start: 2025-02-12 | End: 2025-02-12

## 2025-02-12 RX ORDER — ONDANSETRON HYDROCHLORIDE 2 MG/ML
4 INJECTION, SOLUTION INTRAVENOUS EVERY 8 HOURS PRN
Status: DISCONTINUED | OUTPATIENT
Start: 2025-02-12 | End: 2025-02-14 | Stop reason: HOSPADM

## 2025-02-12 RX ORDER — TORSEMIDE 10 MG/1
20 TABLET ORAL
Status: DISCONTINUED | OUTPATIENT
Start: 2025-02-12 | End: 2025-02-14 | Stop reason: HOSPADM

## 2025-02-12 RX ORDER — PROMETHAZINE HYDROCHLORIDE 12.5 MG/1
25 TABLET ORAL EVERY 6 HOURS PRN
Status: DISCONTINUED | OUTPATIENT
Start: 2025-02-12 | End: 2025-02-14 | Stop reason: HOSPADM

## 2025-02-12 RX ORDER — PROPOFOL 10 MG/ML
VIAL (ML) INTRAVENOUS CONTINUOUS PRN
Status: DISCONTINUED | OUTPATIENT
Start: 2025-02-12 | End: 2025-02-12

## 2025-02-12 RX ORDER — TRAMADOL HYDROCHLORIDE 50 MG/1
50 TABLET ORAL EVERY 6 HOURS PRN
Status: DISCONTINUED | OUTPATIENT
Start: 2025-02-12 | End: 2025-02-14 | Stop reason: HOSPADM

## 2025-02-12 RX ORDER — GABAPENTIN 300 MG/1
300 CAPSULE ORAL
Status: COMPLETED | OUTPATIENT
Start: 2025-02-12 | End: 2025-02-12

## 2025-02-12 RX ORDER — DOCUSATE SODIUM 100 MG/1
100 CAPSULE, LIQUID FILLED ORAL EVERY 12 HOURS
Status: DISCONTINUED | OUTPATIENT
Start: 2025-02-12 | End: 2025-02-14 | Stop reason: HOSPADM

## 2025-02-12 RX ORDER — WARFARIN 1 MG/1
3 TABLET ORAL DAILY
Status: DISCONTINUED | OUTPATIENT
Start: 2025-02-13 | End: 2025-02-14 | Stop reason: HOSPADM

## 2025-02-12 RX ORDER — CEFAZOLIN SODIUM 2 G/50ML
2 SOLUTION INTRAVENOUS
Status: COMPLETED | OUTPATIENT
Start: 2025-02-12 | End: 2025-02-12

## 2025-02-12 RX ORDER — DEXMEDETOMIDINE HYDROCHLORIDE 100 UG/ML
INJECTION, SOLUTION INTRAVENOUS
Status: DISCONTINUED | OUTPATIENT
Start: 2025-02-12 | End: 2025-02-12

## 2025-02-12 RX ORDER — BISACODYL 10 MG/1
10 SUPPOSITORY RECTAL DAILY PRN
Status: DISCONTINUED | OUTPATIENT
Start: 2025-02-12 | End: 2025-02-14 | Stop reason: HOSPADM

## 2025-02-12 RX ORDER — METOPROLOL SUCCINATE 50 MG/1
50 TABLET, EXTENDED RELEASE ORAL EVERY MORNING
Status: DISCONTINUED | OUTPATIENT
Start: 2025-02-13 | End: 2025-02-14 | Stop reason: HOSPADM

## 2025-02-12 RX ORDER — FLUTICASONE PROPIONATE 50 MCG
1 SPRAY, SUSPENSION (ML) NASAL DAILY
Status: DISCONTINUED | OUTPATIENT
Start: 2025-02-12 | End: 2025-02-14 | Stop reason: HOSPADM

## 2025-02-12 RX ORDER — TAMSULOSIN HYDROCHLORIDE 0.4 MG/1
1 CAPSULE ORAL NIGHTLY
Status: DISCONTINUED | OUTPATIENT
Start: 2025-02-12 | End: 2025-02-14 | Stop reason: HOSPADM

## 2025-02-12 RX ORDER — FLUTICASONE FUROATE AND VILANTEROL 100; 25 UG/1; UG/1
1 POWDER RESPIRATORY (INHALATION) DAILY
Status: DISCONTINUED | OUTPATIENT
Start: 2025-02-12 | End: 2025-02-14 | Stop reason: HOSPADM

## 2025-02-12 RX ORDER — CEFAZOLIN SODIUM 1 G/3ML
INJECTION, POWDER, FOR SOLUTION INTRAMUSCULAR; INTRAVENOUS
Status: DISCONTINUED | OUTPATIENT
Start: 2025-02-12 | End: 2025-02-12 | Stop reason: HOSPADM

## 2025-02-12 RX ORDER — MUPIROCIN 20 MG/G
1 OINTMENT TOPICAL 2 TIMES DAILY
Status: DISCONTINUED | OUTPATIENT
Start: 2025-02-12 | End: 2025-02-14 | Stop reason: HOSPADM

## 2025-02-12 RX ORDER — TALC
6 POWDER (GRAM) TOPICAL NIGHTLY PRN
Status: DISCONTINUED | OUTPATIENT
Start: 2025-02-12 | End: 2025-02-14 | Stop reason: HOSPADM

## 2025-02-12 RX ORDER — OXYCODONE HYDROCHLORIDE 5 MG/1
10 TABLET ORAL EVERY 6 HOURS PRN
Status: DISCONTINUED | OUTPATIENT
Start: 2025-02-12 | End: 2025-02-14 | Stop reason: HOSPADM

## 2025-02-12 RX ORDER — MUPIROCIN 20 MG/G
OINTMENT TOPICAL
Status: DISCONTINUED | OUTPATIENT
Start: 2025-02-12 | End: 2025-02-12

## 2025-02-12 RX ORDER — GLUCAGON 1 MG
1 KIT INJECTION
Status: DISCONTINUED | OUTPATIENT
Start: 2025-02-12 | End: 2025-02-12

## 2025-02-12 RX ORDER — CEFAZOLIN SODIUM 1 G/3ML
2 INJECTION, POWDER, FOR SOLUTION INTRAMUSCULAR; INTRAVENOUS
Status: DISCONTINUED | OUTPATIENT
Start: 2025-02-12 | End: 2025-02-12

## 2025-02-12 RX ORDER — ATORVASTATIN CALCIUM 40 MG/1
40 TABLET, FILM COATED ORAL NIGHTLY
Status: DISCONTINUED | OUTPATIENT
Start: 2025-02-12 | End: 2025-02-14 | Stop reason: HOSPADM

## 2025-02-12 RX ORDER — DEXAMETHASONE SODIUM PHOSPHATE 4 MG/ML
INJECTION, SOLUTION INTRA-ARTICULAR; INTRALESIONAL; INTRAMUSCULAR; INTRAVENOUS; SOFT TISSUE
Status: DISCONTINUED | OUTPATIENT
Start: 2025-02-12 | End: 2025-02-12

## 2025-02-12 RX ORDER — SODIUM CHLORIDE, SODIUM LACTATE, POTASSIUM CHLORIDE, CALCIUM CHLORIDE 600; 310; 30; 20 MG/100ML; MG/100ML; MG/100ML; MG/100ML
INJECTION, SOLUTION INTRAVENOUS CONTINUOUS
Status: DISCONTINUED | OUTPATIENT
Start: 2025-02-12 | End: 2025-02-12

## 2025-02-12 RX ORDER — FENTANYL CITRATE 50 UG/ML
25 INJECTION, SOLUTION INTRAMUSCULAR; INTRAVENOUS EVERY 5 MIN PRN
Status: DISCONTINUED | OUTPATIENT
Start: 2025-02-12 | End: 2025-02-12

## 2025-02-12 RX ORDER — TRANEXAMIC ACID 10 MG/ML
1000 INJECTION, SOLUTION INTRAVENOUS
Status: COMPLETED | OUTPATIENT
Start: 2025-02-12 | End: 2025-02-12

## 2025-02-12 RX ORDER — SODIUM CHLORIDE, SODIUM LACTATE, POTASSIUM CHLORIDE, CALCIUM CHLORIDE 600; 310; 30; 20 MG/100ML; MG/100ML; MG/100ML; MG/100ML
INJECTION, SOLUTION INTRAVENOUS CONTINUOUS
Status: DISCONTINUED | OUTPATIENT
Start: 2025-02-12 | End: 2025-02-13

## 2025-02-12 RX ORDER — SODIUM CHLORIDE 9 MG/ML
INJECTION, SOLUTION INTRAMUSCULAR; INTRAVENOUS; SUBCUTANEOUS
Status: DISCONTINUED | OUTPATIENT
Start: 2025-02-12 | End: 2025-02-12 | Stop reason: HOSPADM

## 2025-02-12 RX ORDER — MIDAZOLAM HYDROCHLORIDE 1 MG/ML
INJECTION INTRAMUSCULAR; INTRAVENOUS
Status: DISCONTINUED | OUTPATIENT
Start: 2025-02-12 | End: 2025-02-12

## 2025-02-12 RX ORDER — MORPHINE SULFATE 4 MG/ML
4 INJECTION, SOLUTION INTRAMUSCULAR; INTRAVENOUS
Status: DISCONTINUED | OUTPATIENT
Start: 2025-02-12 | End: 2025-02-14 | Stop reason: HOSPADM

## 2025-02-12 RX ORDER — HYDROMORPHONE HYDROCHLORIDE 2 MG/ML
0.2 INJECTION, SOLUTION INTRAMUSCULAR; INTRAVENOUS; SUBCUTANEOUS EVERY 5 MIN PRN
Status: DISCONTINUED | OUTPATIENT
Start: 2025-02-12 | End: 2025-02-12

## 2025-02-12 RX ORDER — ONDANSETRON HYDROCHLORIDE 2 MG/ML
INJECTION, SOLUTION INTRAVENOUS
Status: DISCONTINUED | OUTPATIENT
Start: 2025-02-12 | End: 2025-02-12

## 2025-02-12 RX ORDER — HYDRALAZINE HYDROCHLORIDE 25 MG/1
25 TABLET, FILM COATED ORAL 3 TIMES DAILY
Status: DISCONTINUED | OUTPATIENT
Start: 2025-02-12 | End: 2025-02-14 | Stop reason: HOSPADM

## 2025-02-12 RX ORDER — BUPIVACAINE HYDROCHLORIDE 2.5 MG/ML
INJECTION, SOLUTION EPIDURAL; INFILTRATION; INTRACAUDAL
Status: DISCONTINUED | OUTPATIENT
Start: 2025-02-12 | End: 2025-02-12 | Stop reason: HOSPADM

## 2025-02-12 RX ADMIN — ACETAMINOPHEN 1000 MG: 500 TABLET, FILM COATED ORAL at 05:02

## 2025-02-12 RX ADMIN — MIDAZOLAM 2 MG: 1 INJECTION INTRAMUSCULAR; INTRAVENOUS at 08:02

## 2025-02-12 RX ADMIN — DOCUSATE SODIUM 100 MG: 100 CAPSULE, LIQUID FILLED ORAL at 08:02

## 2025-02-12 RX ADMIN — BUPIVACAINE HYDROCHLORIDE 30 ML: 2.5 INJECTION, SOLUTION EPIDURAL; INFILTRATION; INTRACAUDAL; PERINEURAL at 10:02

## 2025-02-12 RX ADMIN — ONDANSETRON 4 MG: 2 INJECTION INTRAMUSCULAR; INTRAVENOUS at 07:02

## 2025-02-12 RX ADMIN — DEXMEDETOMIDINE 5 MCG: 200 INJECTION, SOLUTION INTRAVENOUS at 09:02

## 2025-02-12 RX ADMIN — SODIUM CHLORIDE, POTASSIUM CHLORIDE, SODIUM LACTATE AND CALCIUM CHLORIDE: 600; 310; 30; 20 INJECTION, SOLUTION INTRAVENOUS at 03:02

## 2025-02-12 RX ADMIN — CEFAZOLIN SODIUM 2 G: 2 SOLUTION INTRAVENOUS at 08:02

## 2025-02-12 RX ADMIN — TRAMADOL HYDROCHLORIDE 50 MG: 50 TABLET, COATED ORAL at 02:02

## 2025-02-12 RX ADMIN — TORSEMIDE 20 MG: 10 TABLET ORAL at 04:02

## 2025-02-12 RX ADMIN — ATORVASTATIN CALCIUM 40 MG: 40 TABLET, FILM COATED ORAL at 08:02

## 2025-02-12 RX ADMIN — HYDRALAZINE HYDROCHLORIDE 25 MG: 25 TABLET ORAL at 08:02

## 2025-02-12 RX ADMIN — TRANEXAMIC ACID 1000 MG: 10 INJECTION, SOLUTION INTRAVENOUS at 09:02

## 2025-02-12 RX ADMIN — KETOROLAC TROMETHAMINE 15 MG: 30 INJECTION, SOLUTION INTRAMUSCULAR at 10:02

## 2025-02-12 RX ADMIN — DEXAMETHASONE SODIUM PHOSPHATE 4 MG: 4 INJECTION, SOLUTION INTRA-ARTICULAR; INTRALESIONAL; INTRAMUSCULAR; INTRAVENOUS; SOFT TISSUE at 08:02

## 2025-02-12 RX ADMIN — HYDRALAZINE HYDROCHLORIDE 25 MG: 25 TABLET ORAL at 01:02

## 2025-02-12 RX ADMIN — DEXMEDETOMIDINE 5 MCG: 200 INJECTION, SOLUTION INTRAVENOUS at 08:02

## 2025-02-12 RX ADMIN — MIDAZOLAM 1 MG: 1 INJECTION INTRAMUSCULAR; INTRAVENOUS at 08:02

## 2025-02-12 RX ADMIN — SODIUM CHLORIDE, POTASSIUM CHLORIDE, SODIUM LACTATE AND CALCIUM CHLORIDE: 600; 310; 30; 20 INJECTION, SOLUTION INTRAVENOUS at 05:02

## 2025-02-12 RX ADMIN — TAMSULOSIN HYDROCHLORIDE 0.4 MG: 0.4 CAPSULE ORAL at 08:02

## 2025-02-12 RX ADMIN — PROPOFOL 20 MCG/KG/MIN: 10 INJECTION, EMULSION INTRAVENOUS at 08:02

## 2025-02-12 RX ADMIN — SODIUM CHLORIDE, POTASSIUM CHLORIDE, SODIUM LACTATE AND CALCIUM CHLORIDE: 600; 310; 30; 20 INJECTION, SOLUTION INTRAVENOUS at 10:02

## 2025-02-12 RX ADMIN — OXYCODONE 10 MG: 5 TABLET ORAL at 11:02

## 2025-02-12 RX ADMIN — GABAPENTIN 300 MG: 300 CAPSULE ORAL at 05:02

## 2025-02-12 RX ADMIN — DEXMEDETOMIDINE HYDROCHLORIDE 40 MCG: 100 INJECTION, SOLUTION INTRAVENOUS at 10:02

## 2025-02-12 RX ADMIN — MIDAZOLAM 1 MG: 1 INJECTION INTRAMUSCULAR; INTRAVENOUS at 09:02

## 2025-02-12 RX ADMIN — MUPIROCIN 1 G: 20 OINTMENT TOPICAL at 08:02

## 2025-02-12 RX ADMIN — OXYCODONE 10 MG: 5 TABLET ORAL at 04:02

## 2025-02-12 RX ADMIN — CEFAZOLIN SODIUM 2 G: 2 SOLUTION INTRAVENOUS at 01:02

## 2025-02-12 RX ADMIN — MUPIROCIN 1 G: 20 OINTMENT TOPICAL at 05:02

## 2025-02-12 RX ADMIN — ONDANSETRON 4 MG: 2 INJECTION INTRAMUSCULAR; INTRAVENOUS at 08:02

## 2025-02-12 RX ADMIN — TRANEXAMIC ACID 1000 MG: 10 INJECTION, SOLUTION INTRAVENOUS at 08:02

## 2025-02-12 RX ADMIN — MORPHINE SULFATE 4 MG: 4 INJECTION, SOLUTION INTRAMUSCULAR; INTRAVENOUS at 07:02

## 2025-02-12 NOTE — OP NOTE
Operative note     Date: 2/12/2025     Preop diagnosis: right knee osteoarthritis    Postop diagnosis: same    Procedure: right TKA    Surgeon: Marcelo Padron M.D.    Assistant: SANFORD Jefferson    Implant type: Attune press fit: femur size 7, tibial baseplate size 7, poly insert 5mm, non resurfaced patella    Anesthesia: spinal    Complications: none    Blood loss: nil    Procedure in detail:  Informed consent was obtained.  Risks of the procedure was explained not excluding infection, bleeding, pain, scarring, neurovascular injury, implant wear, dislocation of the knee, need for revision surgery.  This patient was brought to the OR given IV antibiotics and spinal anesthesia.  Patient was also administered TXA IV.  Left knee was prepped and draped sterilely.  Tourniquet was inflated to 350.  Anterior incision was made medial arthrotomy was performed.  Patient was noted to have diffuse osteoarthritis throughout the knee.  The patella was not resurfaced.  Next Schanz pins were placed in the proximal tibial and distal femur to accommodate the robotic arrays.  Once this was done intraoperative more was created.  Center of the hip and ankle was defined.  Selected the surgical plan which included a neutral tibial cut  . omplete the tibial cut followed by the femoral cuts which sized it for a 7.  Once tibial and femoral cuts were completed I trialed the construct.  It was knee was brought out to neutral alignment and full extension of the knee was well balanced.  Finishing prep was completed on the tibia back knee was cleaned of meniscal remnants.  Pulsavac lavaged and dried impacted a porous-coated tibial base plate and a medial stabilized poly.  Femoral component porous-coated was completely seated with the knee flexed and gently brought to extension.  Patella tracking was excellent knee was well balanced.  Knee was Pulsavac closed over drain interrupted 1. Vicryl running Stratafix.  Subcu with a  subcuticular and Exofin glue on the skin.  Sterile dressing applied.  No complication.    Marcelo Padron M.D.

## 2025-02-12 NOTE — ANESTHESIA PROCEDURE NOTES
SAB for Surgical Anesthesia    Diagnosis: OA Right Knee  Patient location during procedure: holding area  Start time: 2/12/2025 8:05 AM  Timeout: 2/12/2025 8:05 AM  End time: 2/12/2025 8:13 AM    Staffing  Authorizing Provider: Phil Carranza DO  Performing Provider: Emma Martinez CRNA    Staffing  Performed by: Emma Martinez CRNA  Authorized by: Phil Carranza DO    Preanesthetic Checklist  Completed: patient identified, IV checked, site marked, risks and benefits discussed, surgical consent, monitors and equipment checked, pre-op evaluation and timeout performed  Spinal Block  Patient position: sitting  Prep: Betadine  Patient monitoring: heart rate, cardiac monitor, continuous pulse ox and frequent blood pressure checks  Approach: midline  Location: L2-3  Injection technique: single shot  CSF Fluid: clear free-flowing CSF  Needle  Needle type: pencil-tip   Needle gauge: 24 G  Needle length: 3.5 in  Additional Documentation: no paresthesia on injection and negative aspiration for heme  Needle localization: anatomical landmarks  Assessment  Sensory level: T6   Dermatomal levels determined by pinch or prick  Ease of block: easy  Patient's tolerance of the procedure: comfortable throughout block and no complaints  Additional Notes  Marcaine 0.75% 1.6ml injected

## 2025-02-12 NOTE — CONSULTS
[facility] - Medical Surgical Unit  Hospital Medicine  Consult Note    Patient Name: Reid Mcqueen  MRN: [id number]  Admission Date: 2/12/2025  Hospital Length of Stay: 0 days  Attending Physician: Marcelo Padron MD   Primary Care Provider: Adán Unger III, MD           Patient information was obtained from patient, spouse/SO, and primary team.     Consults  Subjective:     Principal Problem:Primary osteoarthritis of right knee    Chief Complaint: No chief complaint on file.       HPI:   Mr. Mcqueen is a 69-year-old male with primary osteoarthritis of the right knee is s/p right TKA performed earlier today by Dr. Padron.  I am seeing him on the M/S unit at which time he is doing well.  He is experiencing about 8/10 pain in the knee and is currently getting pain medication.  He ambulated with PT earlier in the day.  Hospital Medicine has been consulted to assist in medical management.  His chronic medical issues include paroxysmal AFib, COPD, CKD, hypertension, and BPH.    Past Medical History:   Diagnosis Date    A-fib     Acute renal failure 06/17/2024    Allergy     Asthma     BPH (benign prostatic hyperplasia)     COPD (chronic obstructive pulmonary disease)     HLD (hyperlipidemia)     Hypokalemia 06/16/2024    Kidney stone     Osteoarthritis     Osteoporosis     Primary osteoarthritis of right knee 02/12/2025    Unspecified osteoarthritis, unspecified site     right knee       Past Surgical History:   Procedure Laterality Date    ARTHROSCOPY OF BOTH KNEES      CATARACT EXTRACTION, BILATERAL         Review of patient's allergies indicates:   Allergen Reactions    Cardizem [diltiazem hcl] Other (See Comments)       No current facility-administered medications on file prior to encounter.     Current Outpatient Medications on File Prior to Encounter   Medication Sig    acetaminophen (TYLENOL) 650 MG TbSR Take 650 mg by mouth 2 (two) times a day. Patient states takes  2 bid     albuterol (PROVENTIL/VENTOLIN HFA) 90 mcg/actuation inhaler Inhale 2 puffs into the lungs every 6 (six) hours as needed for Wheezing. Rescue    atorvastatin (LIPITOR) 40 MG tablet Take 40 mg by mouth every evening.    budesonide-formoterol 160-4.5 mcg (SYMBICORT) 160-4.5 mcg/actuation HFAA Inhale 2 puffs into the lungs every 12 (twelve) hours. Controller    carboxymethylcellulose (REFRESH PLUS) 0.5 % Dpet Place 1 drop into both eyes every morning.    fexofenadine (ALLEGRA) 180 MG tablet Take 180 mg by mouth every morning.    fluticasone (VERAMYST) 27.5 mcg/actuation nasal spray 1 spray by Nasal route every morning.    hydrALAZINE (APRESOLINE) 25 MG tablet Take 25 mg by mouth 3 (three) times daily.    metoprolol succinate (TOPROL-XL) 50 MG 24 hr tablet Take 50 mg by mouth every morning.    tamsulosin (FLOMAX) 0.4 mg Cap Take 1 capsule by mouth nightly.    torsemide (DEMADEX) 20 MG Tab Take 20 mg by mouth every Mon, Wed, Fri, Sun.    vitamin D (VITAMIN D3) 1000 units Tab Take 2,000 Units by mouth every morning.    warfarin (COUMADIN) 2.5 MG tablet Take 3 mg by mouth Daily. Stopped per ade 2-7-25     Family History       Problem Relation (Age of Onset)    Cancer Mother, Father          Tobacco Use    Smoking status: Never    Smokeless tobacco: Not on file   Substance and Sexual Activity    Alcohol use: No    Drug use: No    Sexual activity: Not Currently     Review of Systems   Constitutional: Negative.    HENT: Negative.     Respiratory: Negative.     Cardiovascular: Negative.    Gastrointestinal: Negative.    Genitourinary: Negative.    Musculoskeletal:  Positive for arthralgias.   Neurological: Negative.    Psychiatric/Behavioral: Negative.       Objective:     Vital Signs (Most Recent):  Temp: 97.7 °F (36.5 °C) (02/12/25 1620)  Pulse: 64 (02/12/25 1620)  Resp: 16 (02/12/25 1650)  BP: 138/81 (02/12/25 1620)  SpO2: 97 % (02/12/25 1620) Vital Signs (24h Range):  Temp:  [96.8 °F (36 °C)-97.7 °F (36.5 °C)] 97.7 °F  "(36.5 °C)  Pulse:  [51-73] 64  Resp:  [9-22] 16  SpO2:  [90 %-98 %] 97 %  BP: (102-188)/() 138/81     Weight: 119.7 kg (263 lb 14.3 oz)  Body mass index is 41.33 kg/m².    Physical exam  Constitution-well nourished, normally developed male in NAD  Eyes-PERRL, EOMI  HENT-normocephalic, atraumatic  Neck-supple  Respiratory-normal respirations; CTA with good air movement  Heart-RRR; normal S1 and S2; no murmurs, gallops  Abdomen-soft, nontender, nondistended  Genitourinary-deferred  Musculoskeletal-no joint abnormalities, normal ROM throughout; dressings, drain in place right knee  Skin-warm, dry; no rashes  Neurologic-alert and oriented x3    Scheduled Meds:   atorvastatin  40 mg Oral QHS    ceFAZolin (Ancef) IV (PEDS and ADULTS)  2 g Intravenous Q6H    docusate sodium  100 mg Oral Q12H    fluticasone furoate-vilanteroL  1 puff Inhalation Daily    fluticasone propionate  1 spray Each Nostril Daily    hydrALAZINE  25 mg Oral TID    ketorolac  15 mg Intravenous Q8H    [START ON 2/13/2025] metoprolol succinate  50 mg Oral QAM    mupirocin  1 g Nasal BID    tamsulosin  1 capsule Oral Nightly    torsemide  20 mg Oral Every Mon, Wed, Fri, Sun    [START ON 2/13/2025] warfarin  3 mg Oral Daily     Continuous Infusions:   lactated ringers   Intravenous Continuous 75 mL/hr at 02/12/25 1518 New Bag at 02/12/25 1518     PRN Meds:.  Current Facility-Administered Medications:     albuterol, 2 puff, Inhalation, Q6H PRN    bisacodyL, 10 mg, Rectal, Daily PRN    melatonin, 6 mg, Oral, Nightly PRN    morphine, 4 mg, Intravenous, Q3H PRN    ondansetron, 4 mg, Intravenous, Q8H PRN    oxyCODONE, 10 mg, Oral, Q6H PRN    promethazine, 25 mg, Oral, Q6H PRN    traMADoL, 50 mg, Oral, Q6H PRN      Significant Labs: All pertinent labs within the past 24 hours have been reviewed.  CBC: No results for input(s): "WBC", "HGB", "HCT", "PLT" in the last 48 hours.  CMP: No results for input(s): "NA", "K", "CL", "CO2", "GLU", "BUN", "CREATININE", " ""CALCIUM", "PROT", "ALBUMIN", "BILITOT", "ALKPHOS", "AST", "ALT", "ANIONGAP", "EGFRNONAA" in the last 48 hours.    Invalid input(s): "ESTGFAFRICA"  Magnesium: No results for input(s): "MG" in the last 48 hours.        Assessment/Plan:   Primary osteoarthritis right knee  S/p right TKA  Postop care as per Orthopedics    Paroxysmal Afib  He remains on OAC with Coumadin  Is on beta-blocker for rate control  Recommend placing him on telemetry while in hospital    Hypertension  Medications have been continued    COPD  Continue bronchodilator therapy    BPH  Medications have been continued    History of AKA/CKD, stage II  Avoid nephrotoxic agents as possible  Recommend avoidance of NSAIDs      Active Diagnoses:    Diagnosis Date Noted POA    PRINCIPAL PROBLEM:  Primary osteoarthritis of right knee [M17.11] 02/12/2025 Yes      Problems Resolved During this Admission:     VTE Risk Mitigation (From admission, onward)           Ordered     warfarin (COUMADIN) tablet 3 mg  Daily         02/12/25 0610     IP VTE LOW RISK PATIENT  Once         02/12/25 0950     Place FINN hose  Until discontinued         02/12/25 0950     Place sequential compression device  Until discontinued         02/12/25 0950                Patient screened for social drivers of health:  Housing instability  Transportation needs  Utility difficulties  Interpersonal safety  ---no needs identified  ---case management consulted for     Thank you for your consult. I will follow-up with patient. Please contact us if you have any additional questions.    Kaden Cardoza MD  Department of Hospital Medicine   [facility] - Medical Surgical Unit    "

## 2025-02-12 NOTE — PT/OT/SLP EVAL
Physical Therapy Evaluation    Patient Name:  Reid Mcqueen   MRN:  [id number]    Recommendations:     Discharge Recommendations: Low Intensity Therapy (home health services)   Discharge Equipment Recommendations: walker, rolling   Barriers to discharge:  none    Assessment:     Reid Mcqueen is a 69 y.o. male admitted with a medical diagnosis of Primary osteoarthritis of right knee, s/p TKA.  He presents with the following impairments/functional limitations: weakness, impaired endurance, impaired self care skills, impaired functional mobility, gait instability, impaired balance, pain, decreased lower extremity function, edema, decreased ROM.    Assessment and tx: Patient is moving fairly well for day of surgery. Patient ambulated ~30ft, seated rest, and then again for ~40ft with RW and mod A - did have mild buckling and pain in R LE during gait necessitating assist but is still fairly fresh from surgery. Educated on positioning of knee in bed and plan of care. Will progress as able. Will likely do well with home health services, and wife is available to assist as needed (she is a former rehab nurse).    Rehab Prognosis: Good; patient would benefit from acute skilled PT services to address these deficits and reach maximum level of function.    Recent Surgery: Procedure(s) (LRB):  ROBOTIC ARTHROPLASTY, KNEE, TOTAL (Right) Day of Surgery    Plan:     During this hospitalization, patient to be seen 6 x/week to address the identified rehab impairments via gait training, therapeutic activities, therapeutic exercises and progress toward the following goals:    Plan of Care Expires:       Subjective     Chief Complaint: right knee discomfort  Patient/Family Comments/goals: to go home with home health services  Pain/Comfort:  Pain Rating 1: 5/10  Location - Side 1: Right  Location 1: knee  Pain Addressed 1: Pre-medicate for activity, Nurse notified, Reposition    Patients cultural, spiritual, Quaker conflicts  given the current situation:      Living Environment:  Patient lives with wife in a home with no steps to enter, was independent with ADLs and mobility prior to admit.  Equipment used at home: grab bar, raised toilet.  DME owned (not currently used): none.  Upon discharge, patient will have assistance from wife (former rehab nurse).    Objective:     Communicated with nurse prior to session.  Patient found sitting edge of bed with bed alarm, cryotherapy, hemovac  upon PT entry to room.    General Precautions: Standard, fall  Orthopedic Precautions:RLE weight bearing as tolerated   Braces: N/A  Respiratory Status: Room air    Exams:  LLE ROM: WFL  LLE Strength: WFL    Functional Mobility:  Bed Mobility:     Sit to Supine: moderate assistance  Transfers:     Sit to Stand:  minimum assistance with rolling walker  Gait:  Patient ambulated ~30ft, seated rest, and then again for ~40ft with RW and mod A - did have mild buckling and pain in R LE during gait necessitating assist but is still fairly fresh from surgery.        Patient left HOB elevated with all lines intact, call button in reach, bed alarm on, nurse notified, and nurse present.    GOALS:   Multidisciplinary Problems       Physical Therapy Goals          Problem: Physical Therapy    Goal Priority Disciplines Outcome Interventions   Physical Therapy Goal     PT, PT/OT Progressing    Description: Goals to be met by: discharge     Patient will increase functional independence with mobility by performin. Bed to chair transfer with Modified [place] using Rolling Walker  2. Gait  x 150 feet with Modified [place] using Rolling Walker.                          DME Justifications:   Reid's mobility limitation cannot be sufficiently resolved by the use of a cane. His functional mobility deficit can be sufficiently resolved with the use of a Rolling Walker. Patient's mobility limitation significantly impairs their ability to participate in one of more  activities of daily living.  The use of a RW will significantly improve the patient's ability to participate in MRADLS and the patient will use it on regular basis in the home.    History:     Past Medical History:   Diagnosis Date    A-fib     Acute renal failure 06/17/2024    Allergy     Asthma     BPH (benign prostatic hyperplasia)     COPD (chronic obstructive pulmonary disease)     HLD (hyperlipidemia)     Hypokalemia 06/16/2024    Kidney stone     Osteoarthritis     Osteoporosis     Primary osteoarthritis of right knee 02/12/2025    Unspecified osteoarthritis, unspecified site     right knee       Past Surgical History:   Procedure Laterality Date    ARTHROSCOPY OF BOTH KNEES      CATARACT EXTRACTION, BILATERAL         Time Tracking:     PT Received On: 02/12/25  PT Start Time: 1418     PT Stop Time: 1435  PT Total Time (min): 17 min     Billable Minutes: Evaluation 9 and Gait Training 8      02/12/2025

## 2025-02-12 NOTE — ANESTHESIA PROCEDURE NOTES
Right Adductor and Vastus Medialis Nerve Block    Patient location during procedure: post-op   Block not for primary anesthetic.  Reason for block: at surgeon's request and post-op pain management   Post-op Pain Location: Right Knee   Start time: 2/12/2025 10:10 AM  Timeout: 2/12/2025 10:10 AM   End time: 2/12/2025 10:13 AM    Staffing  Authorizing Provider: Phil Carranza DO  Performing Provider: Emma Martinez CRNA    Staffing  Performed by: Emma Martinez CRNA  Authorized by: Phil Carranza DO    Preanesthetic Checklist  Completed: patient identified, IV checked, site marked, risks and benefits discussed, surgical consent, monitors and equipment checked, pre-op evaluation and timeout performed  Peripheral Block  Patient position: supine  Prep: ChloraPrep  Patient monitoring: heart rate, cardiac monitor, continuous pulse ox and frequent blood pressure checks  Block type: adductor canal (Vastus Medialis)  Laterality: right  Injection technique: single shot  Needle  Needle type: Stimuplex   Needle gauge: 20 G  Needle length: 4 in  Needle localization: anatomical landmarks and ultrasound guidance   -ultrasound image captured on disc.  Assessment  Injection assessment: local visualized surrounding nerve, negative aspiration and negative parasthesia  Heart rate change: no  Slow fractionated injection: yes  Pain Tolerance: comfortable throughout block and no complaints  Medications:    Medications: bupivacaine (pf) (MARCAINE) injection 0.25% - Perineural, Other   30 mL - 2/12/2025 10:13:00 AM    Additional Notes  Precedex 40mcg added to the block

## 2025-02-12 NOTE — TRANSFER OF CARE
"Anesthesia Transfer of Care Note    Patient: Reid Mcqueen    Procedure(s) Performed: Procedure(s) (LRB):  ROBOTIC ARTHROPLASTY, KNEE, TOTAL (Right)    Patient location: PACU    Anesthesia Type: general and spinal    Transport from OR: Transported from OR on room air with adequate spontaneous ventilation    Post pain: adequate analgesia    Post assessment: no apparent anesthetic complications    Post vital signs: stable    Level of consciousness: awake    Nausea/Vomiting: no nausea/vomiting    Complications: none    Transfer of care protocol was followed      Last vitals: Visit Vitals  /69 (BP Location: Left arm, Patient Position: Lying)   Pulse (!) 58   Temp 36 °C (96.8 °F)   Resp (!) 22   Ht 5' 7" (1.702 m)   Wt 119.7 kg (263 lb 14.3 oz)   SpO2 95%   BMI 41.33 kg/m²     "

## 2025-02-12 NOTE — ANESTHESIA POSTPROCEDURE EVALUATION
Anesthesia Post Evaluation    Patient: Reid Mcqueen    Procedure(s) Performed: Procedure(s) (LRB):  ROBOTIC ARTHROPLASTY, KNEE, TOTAL (Right)    Final Anesthesia Type: spinal      Patient location during evaluation: PACU  Patient participation: Yes- Able to Participate  Level of consciousness: awake and alert  Post-procedure vital signs: reviewed and stable  Pain management: adequate  Airway patency: patent  SERAFIN mitigation strategies: Use of major conduction anesthesia (spinal/epidural) or peripheral nerve block  PONV status at discharge: No PONV  Anesthetic complications: no      Cardiovascular status: hemodynamically stable  Respiratory status: unassisted, room air and spontaneous ventilation  Hydration status: euvolemic  Follow-up not needed.  Comments: Resolving SAB              Vitals Value Taken Time   /69 02/12/25 1011   Temp 36 °C (96.8 °F) 02/12/25 1000   Pulse 60 02/12/25 1015   Resp 14 02/12/25 1015   SpO2 96 % 02/12/25 1015   Vitals shown include unfiled device data.      No case tracking events are documented in the log.      Pain/Madeline Score: Pain Rating Prior to Med Admin: 0 (2/12/2025  5:45 AM)  Madeline Score: 7 (2/12/2025 10:00 AM)

## 2025-02-13 LAB
ANION GAP SERPL CALC-SCNC: 6 MEQ/L
BUN SERPL-MCNC: 26 MG/DL (ref 8.4–25.7)
CALCIUM SERPL-MCNC: 8.8 MG/DL (ref 8.8–10)
CHLORIDE SERPL-SCNC: 106 MMOL/L (ref 98–107)
CO2 SERPL-SCNC: 28 MMOL/L (ref 23–31)
CREAT SERPL-MCNC: 1.2 MG/DL (ref 0.72–1.25)
CREAT/UREA NIT SERPL: 22
GFR SERPLBLD CREATININE-BSD FMLA CKD-EPI: >60 ML/MIN/1.73/M2
GLUCOSE SERPL-MCNC: 133 MG/DL (ref 82–115)
HCT VFR BLD AUTO: 41.4 % (ref 42–52)
HGB BLD-MCNC: 13.4 G/DL (ref 14–18)
INR PPP: 1
MAGNESIUM SERPL-MCNC: 2 MG/DL (ref 1.6–2.6)
POTASSIUM SERPL-SCNC: 4.5 MMOL/L (ref 3.5–5.1)
PROTHROMBIN TIME: 14 SECONDS (ref 12.4–14.9)
PSYCHE PATHOLOGY RESULT: NORMAL
SODIUM SERPL-SCNC: 140 MMOL/L (ref 136–145)

## 2025-02-13 PROCEDURE — 94799 UNLISTED PULMONARY SVC/PX: CPT | Mod: XB

## 2025-02-13 PROCEDURE — 36415 COLL VENOUS BLD VENIPUNCTURE: CPT | Performed by: PHYSICIAN ASSISTANT

## 2025-02-13 PROCEDURE — 83735 ASSAY OF MAGNESIUM: CPT | Performed by: EMERGENCY MEDICINE

## 2025-02-13 PROCEDURE — G0378 HOSPITAL OBSERVATION PER HR: HCPCS

## 2025-02-13 PROCEDURE — 97530 THERAPEUTIC ACTIVITIES: CPT

## 2025-02-13 PROCEDURE — 85014 HEMATOCRIT: CPT | Performed by: PHYSICIAN ASSISTANT

## 2025-02-13 PROCEDURE — 97116 GAIT TRAINING THERAPY: CPT

## 2025-02-13 PROCEDURE — 99900035 HC TECH TIME PER 15 MIN (STAT)

## 2025-02-13 PROCEDURE — 63600175 PHARM REV CODE 636 W HCPCS: Performed by: PHYSICIAN ASSISTANT

## 2025-02-13 PROCEDURE — 80048 BASIC METABOLIC PNL TOTAL CA: CPT | Performed by: PHYSICIAN ASSISTANT

## 2025-02-13 PROCEDURE — 94761 N-INVAS EAR/PLS OXIMETRY MLT: CPT

## 2025-02-13 PROCEDURE — 85610 PROTHROMBIN TIME: CPT | Performed by: SPECIALIST

## 2025-02-13 PROCEDURE — 25000003 PHARM REV CODE 250: Performed by: PHYSICIAN ASSISTANT

## 2025-02-13 RX ORDER — CEFADROXIL 500 MG/1
500 CAPSULE ORAL EVERY 12 HOURS
Qty: 20 CAPSULE | Refills: 0 | Status: SHIPPED | OUTPATIENT
Start: 2025-02-13 | End: 2025-02-23

## 2025-02-13 RX ORDER — MUPIROCIN 20 MG/G
1 OINTMENT TOPICAL 2 TIMES DAILY
Qty: 8 G | Refills: 0 | Status: SHIPPED | OUTPATIENT
Start: 2025-02-13 | End: 2025-02-17

## 2025-02-13 RX ORDER — OXYCODONE AND ACETAMINOPHEN 10; 325 MG/1; MG/1
1 TABLET ORAL EVERY 6 HOURS PRN
Qty: 28 TABLET | Refills: 0 | Status: SHIPPED | OUTPATIENT
Start: 2025-02-13

## 2025-02-13 RX ADMIN — HYDRALAZINE HYDROCHLORIDE 25 MG: 25 TABLET ORAL at 03:02

## 2025-02-13 RX ADMIN — DOCUSATE SODIUM 100 MG: 100 CAPSULE, LIQUID FILLED ORAL at 08:02

## 2025-02-13 RX ADMIN — TRAMADOL HYDROCHLORIDE 50 MG: 50 TABLET, COATED ORAL at 04:02

## 2025-02-13 RX ADMIN — MORPHINE SULFATE 4 MG: 4 INJECTION, SOLUTION INTRAMUSCULAR; INTRAVENOUS at 09:02

## 2025-02-13 RX ADMIN — METOPROLOL SUCCINATE 50 MG: 50 TABLET, EXTENDED RELEASE ORAL at 06:02

## 2025-02-13 RX ADMIN — ONDANSETRON 4 MG: 2 INJECTION INTRAMUSCULAR; INTRAVENOUS at 09:02

## 2025-02-13 RX ADMIN — MUPIROCIN 1 G: 20 OINTMENT TOPICAL at 08:02

## 2025-02-13 RX ADMIN — HYDRALAZINE HYDROCHLORIDE 25 MG: 25 TABLET ORAL at 08:02

## 2025-02-13 RX ADMIN — MORPHINE SULFATE 4 MG: 4 INJECTION, SOLUTION INTRAMUSCULAR; INTRAVENOUS at 11:02

## 2025-02-13 RX ADMIN — OXYCODONE 10 MG: 5 TABLET ORAL at 08:02

## 2025-02-13 RX ADMIN — WARFARIN SODIUM 3 MG: 1 TABLET ORAL at 04:02

## 2025-02-13 RX ADMIN — OXYCODONE 10 MG: 5 TABLET ORAL at 05:02

## 2025-02-13 RX ADMIN — TAMSULOSIN HYDROCHLORIDE 0.4 MG: 0.4 CAPSULE ORAL at 08:02

## 2025-02-13 RX ADMIN — ATORVASTATIN CALCIUM 40 MG: 40 TABLET, FILM COATED ORAL at 08:02

## 2025-02-13 NOTE — PT/OT/SLP PROGRESS
Physical Therapy Treatment    Patient Name:  Reid Mcqueen   MRN:  [id number]    Recommendations:     Discharge Recommendations: Low Intensity Therapy (home health services)  Discharge Equipment Recommendations: walker, rolling  Barriers to discharge:  none    Assessment:     Reid Mcqueen is a 69 y.o. male admitted with a medical diagnosis of Primary osteoarthritis of right knee, s/p TKA.  He presents with the following impairments/functional limitations: weakness, impaired endurance, impaired self care skills, impaired functional mobility, gait instability, impaired balance, pain, decreased lower extremity function, edema, decreased ROM .    Assessment and tx: Patient was able to participate in therapy despite being limited by pain in his knee - patient ambulated ~100ft with RW with mod A progressing to min A with RW - antalgic gait pattern relative to his right knee. Demo'd good bed mobility - progressed to SBA for supine>sit. Performed gentle AAROM to R knee to improve range and educated on his knee positioning at rest. Will continue to progress as able.    Rehab Prognosis: Good; patient would benefit from acute skilled PT services to address these deficits and reach maximum level of function.    Recent Surgery: Procedure(s) (LRB):  ROBOTIC ARTHROPLASTY, KNEE, TOTAL (Right) 1 Day Post-Op    Plan:     During this hospitalization, patient to be seen 6 x/week to address the identified rehab impairments via gait training, therapeutic activities, therapeutic exercises and progress toward the following goals:    Plan of Care Expires:       Subjective     Chief Complaint: right knee pain  Patient/Family Comments/goals: to go home  Pain/Comfort:  Pain Rating 1: 10/10  Location - Side 1: Right  Location 1: knee  Pain Addressed 1: Reposition, Nurse notified, Pre-medicate for activity      Objective:     Communicated with nurse prior to session.  Patient found HOB elevated with bed alarm, cryotherapy, hemovac upon  PT entry to room.     General Precautions: Standard, fall  Orthopedic Precautions: RLE weight bearing as tolerated  Braces: N/A  Respiratory Status: Room air     Functional Mobility:  Bed Mobility:     Supine to Sit: stand by assistance  Transfers:     Sit to Stand:  contact guard assistance with rolling walker  Bed to Chair: stand by assistance with  rolling walker  using  Step Transfer  Gait:  patient ambulated ~100ft with RW with mod A progressing to min A with RW - antalgic gait pattern relative to his right knee.         Patient left up in chair with all lines intact, call button in reach, chair alarm on, and nurse notified..    GOALS:   Multidisciplinary Problems       Physical Therapy Goals          Problem: Physical Therapy    Goal Priority Disciplines Outcome Interventions   Physical Therapy Goal     PT, PT/OT Progressing    Description: Goals to be met by: discharge     Patient will increase functional independence with mobility by performin. Bed to chair transfer with Modified [place] using Rolling Walker  2. Gait  x 150 feet with Modified [place] using Rolling Walker.                          DME Justifications:   Reid's mobility limitation cannot be sufficiently resolved by the use of a cane. His functional mobility deficit can be sufficiently resolved with the use of a Rolling Walker. Patient's mobility limitation significantly impairs their ability to participate in one of more activities of daily living.  The use of a RW will significantly improve the patient's ability to participate in MRADLS and the patient will use it on regular basis in the home.    Time Tracking:     PT Received On: 25  PT Start Time: 929     PT Stop Time: 959  PT Total Time (min): 30 min     Billable Minutes: Gait Training 20 and Therapeutic Activity 10    Treatment Type: Treatment  PT/PTA: PT           2025

## 2025-02-13 NOTE — PLAN OF CARE
02/13/25 1436   Discharge Reassessment   Assessment Type Discharge Planning Reassessment   Discharge Plan discussed with: Patient;Spouse/sig other   Discharge Plan A Rehab   Discharge Plan B Rehab   DME Needed Upon Discharge  none   Transition of Care Barriers None   Why the patient remains in the hospital Requires continued medical care   Post-Acute Status   Post-Acute Authorization Placement   Post-Acute Placement Status Referrals Sent   Discharge Delays (!) Post-Acute Set-up     Pt decided he wants to go to [facility].  PT suggests rehab, pt too weak to go home.  Orders per Estelita with Dr. Padron to send referral to [facility]. Wife works there. Referral sent.

## 2025-02-13 NOTE — ASSESSMENT & PLAN NOTE
Postop day 1., right total knee arthroplasty, robotically assisted.    We will discontinue Hemovac drain prior to discharge.    Patient will be discharged to home with home health to include PT, weight-bearing as tolerated with walker.    He will be given prescriptions for Percocet 10/325 mg q.6 hours p.r.n. for pain and Duricef 500 mg b.i.d. for 10 days.    He has resumed his Coumadin.  He will wear bilateral Humberto hose for 6 weeks for DVT prophylaxis.    He will follow up with Dr. Padron in 2 weeks, Aquacel dressing may stay in place until follow up appointment.

## 2025-02-13 NOTE — HPI
This is a 69-year-old male who was hospitalized yesterday postoperatively following right total knee arthroplasty, robotic assisted.

## 2025-02-13 NOTE — PT/OT/SLP PROGRESS
Physical Therapy Treatment    Patient Name:  Reid Mcqueen   MRN:  [id number]    Recommendations:     Discharge Recommendations: High Intensity Therapy  Discharge Equipment Recommendations: walker, rolling  Barriers to discharge:  none    Assessment:     Reid Mcqueen is a 69 y.o. male admitted with a medical diagnosis of Primary osteoarthritis of right knee, s/p TKA.  He presents with the following impairments/functional limitations: weakness, impaired endurance, impaired self care skills, impaired functional mobility, gait instability, impaired balance, pain, decreased lower extremity function, edema, decreased ROM .    Assessment and tx: Patient was still limited by pain this afternoon session but rated pain 5/10 (was 10/10 in morning session), however, his mobility and stability during transfers has declined. Performed multiple sit<>stand and bed<>chair transfers during training with patient exhibiting unsteadiness, continues to require cues for correct hand placement and is at a high risk for falls upon returning home. Patient's gait remains antalgic with step to pattern with excess weight being placed through B UE displaced center of gravity at times - gait speed is very slow (3 minutes to ambulate ~12ft to door with CGA due to his unsteadiness). Notified the patientEstelita of Dr. Padron's office, Gayatri his nurse, and case management of my concerns regarding his fall risk upon returning home. Patient stated he would like to talk to his wife about this therapist's recommendation of inpatient rehab before deciding between home health and rehab.     Rehab Prognosis: Good; patient would benefit from acute skilled PT services to address these deficits and reach maximum level of function.    Recent Surgery: Procedure(s) (LRB):  ROBOTIC ARTHROPLASTY, KNEE, TOTAL (Right) 1 Day Post-Op    Plan:     During this hospitalization, patient to be seen 6 x/week to address the identified rehab impairments via gait  training, therapeutic activities, therapeutic exercises and progress toward the following goals:    Plan of Care Expires:       Subjective     Chief Complaint: right knee pain  Patient/Family Comments/goals: to go home  Pain/Comfort:  Pain Rating 1: 5/10  Location - Side 1: Right  Location 1: knee  Pain Addressed 1: Pre-medicate for activity, Nurse notified, Reposition      Objective:     Communicated with nurse prior to session.  Patient found HOB elevated with cryotherapy upon PT entry to room.     General Precautions: Standard, fall  Orthopedic Precautions: RLE weight bearing as tolerated  Braces: N/A  Respiratory Status: Room air     Functional Mobility:  Transfers:     Sit to Stand:  minimum assistance with rolling walker  Bed to Chair: minimum assistance with  rolling walker  using  Step Transfer  Gait:  patient ambulated ~100ft - rest then 100ft again with RW with CGA with antalgic gait pattern relative to his right knee and overall unsteadiness necessitating the CGA.         Patient left up in chair with all lines intact, call button in reach, chair alarm on, and nurse notified..    GOALS:   Multidisciplinary Problems       Physical Therapy Goals          Problem: Physical Therapy    Goal Priority Disciplines Outcome Interventions   Physical Therapy Goal     PT, PT/OT Progressing    Description: Goals to be met by: discharge     Patient will increase functional independence with mobility by performin. Bed to chair transfer with Modified [place] using Rolling Walker  2. Gait  x 150 feet with Modified [place] using Rolling Walker.                          DME Justifications:   Reid's mobility limitation cannot be sufficiently resolved by the use of a cane. His functional mobility deficit can be sufficiently resolved with the use of a Rolling Walker. Patient's mobility limitation significantly impairs their ability to participate in one of more activities of daily living.  The use of a RW  will significantly improve the patient's ability to participate in MRADLS and the patient will use it on regular basis in the home.    Time Tracking:     PT Received On: 02/13/25  PT Start Time: 1317     PT Stop Time: 1355  PT Total Time (min): 38 min     Billable Minutes: Gait Training 20 and Therapeutic Activity 18    Treatment Type: Treatment  PT/PTA: PT           02/13/2025

## 2025-02-13 NOTE — PLAN OF CARE
Discharge order was sent over to [facility] .  Pt notified me that the pt may benefit from inpatient rehab and I advise that the CM can speak w/ pt and his wife to see what they would agree to

## 2025-02-13 NOTE — DISCHARGE SUMMARY
"[facility] [place] [facility] Surgical [facility]  Discharge Note  Short Stay    Procedure(s) (LRB):  ROBOTIC ARTHROPLASTY, KNEE, TOTAL (Right)      OUTCOME: Patient tolerated treatment/procedure well without complication and is now ready for discharge.    DISPOSITION: Home-Health Care [facility]    FINAL DIAGNOSIS:  Primary osteoarthritis of right knee    FOLLOWUP: In clinic    DISCHARGE INSTRUCTIONS:    Discharge Procedure Orders   WALKER FOR HOME USE     Order Specific Question Answer Comments   Type of Walker: Adult (5'4"-6'6")    With wheels? Yes    Height: 5' 7" (1.702 m)    Weight: 119.7 kg (263 lb 14.3 oz)    Length of need (1-99 months): 99    Does patient have medical equipment at home? grab bar    Does patient have medical equipment at home? raised toilet    Please check all that apply: Patient's condition impairs ambulation.    Please check all that apply: Patient is unable to safely ambulate without equipment.    Please check all that apply: Patient needs help to get in and out of chair.    Please check all that apply: Walker will be used for gait training.      Ambulatory referral/consult to Home Health   Standing Status: Future   Referral Priority: Routine Referral Type: Home Health   Referral Reason: Specialty Services Required   Requested Specialty: Home Health Services   Number of Visits Requested: 1     Diet Adult Regular     Keep surgical extremity elevated     Ice to affected area     Leave dressing on - Keep it clean, dry, and intact until clinic visit     Notify your health care provider if you experience any of the following:  temperature >100.4     Notify your health care provider if you experience any of the following:  persistent nausea and vomiting or diarrhea     Notify your health care provider if you experience any of the following:  severe uncontrolled pain     Notify your health care provider if you experience any of the following:  redness, tenderness, or signs of infection (pain, swelling, " redness, odor or green/yellow discharge around incision site)     Notify your health care provider if you experience any of the following:  difficulty breathing or increased cough     Notify your health care provider if you experience any of the following:  persistent dizziness, light-headedness, or visual disturbances     Notify your health care provider if you experience any of the following:  increased confusion or weakness     Weight bearing restrictions (specify):   Order Comments: Weight Bearing as tolerated with walker        TIME SPENT ON DISCHARGE: 20 minutes

## 2025-02-13 NOTE — PROGRESS NOTES
[facility] - Medical Surgical Unit  Orthopedics  Progress Note    Patient Name: Reid Mcqueen  MRN: [id number]  Admission Date: 2/12/2025  Hospital Length of Stay: 0 days  Attending Provider: Marcelo Padron MD  Primary Care Provider: Adán Unger III, MD  Follow-up For: Procedure(s) (LRB):  ROBOTIC ARTHROPLASTY, KNEE, TOTAL (Right)    Post-Operative Day: 1 Day Post-Op  Subjective:     Principal Problem:Primary osteoarthritis of right knee    Principal Orthopedic Problem:  Right knee osteoarthritis    Interval History:  The patient is postop day 1. Right total knee arthroplasty, robotically assisted.  The patient participated well with therapy.  He does mention pain but it is tolerable with his pain medication.  He is weight-bearing as tolerated with a walker.  We will discontinue his Hemovac drain as it had minimal drainage.  He will be discharged home with home health to include PT. we have resumed his Coumadin.    Review of patient's allergies indicates:   Allergen Reactions    Cardizem [diltiazem hcl] Other (See Comments)       Current Facility-Administered Medications   Medication    albuterol inhaler 2 puff    atorvastatin tablet 40 mg    bisacodyL suppository 10 mg    docusate sodium capsule 100 mg    fluticasone furoate-vilanteroL 100-25 mcg/dose diskus inhaler 1 puff    fluticasone propionate 50 mcg/actuation nasal spray 50 mcg    hydrALAZINE tablet 25 mg    lactated ringers infusion    melatonin tablet 6 mg    metoprolol succinate (TOPROL-XL) 24 hr tablet 50 mg    morphine injection 4 mg    mupirocin 2 % ointment 1 g    ondansetron injection 4 mg    oxyCODONE immediate release tablet 10 mg    promethazine tablet 25 mg    tamsulosin 24 hr capsule 0.4 mg    torsemide tablet 20 mg    traMADoL tablet 50 mg    warfarin (COUMADIN) tablet 3 mg     Objective:     Vital Signs (Most Recent):  Temp: 98 °F (36.7 °C) (02/13/25 1027)  Pulse: 71 (02/13/25 1027)  Resp: 18 (02/13/25 1114)  BP:  "115/71 (02/13/25 1027)  SpO2: (!) 93 % (02/13/25 1027) Vital Signs (24h Range):  Temp:  [97.7 °F (36.5 °C)-98.2 °F (36.8 °C)] 98 °F (36.7 °C)  Pulse:  [] 71  Resp:  [16-20] 18  SpO2:  [93 %-98 %] 93 %  BP: (106-144)/(59-86) 115/71     Weight: 119.7 kg (263 lb 14.3 oz)  Height: 5' 7" (170.2 cm)  Body mass index is 41.33 kg/m².      Intake/Output Summary (Last 24 hours) at 2/13/2025 1229  Last data filed at 2/13/2025 1224  Gross per 24 hour   Intake 2931.58 ml   Output 2300 ml   Net 631.58 ml        General    Constitutional: He is oriented to person, place, and time. He appears well-developed and well-nourished.   HENT:   Head: Normocephalic and atraumatic.   Eyes: Conjunctivae and EOM are normal.   Neck: Neck supple.   Cardiovascular:  Normal rate and intact distal pulses.            Pulmonary/Chest: Effort normal. No respiratory distress.   Neurological: He is alert and oriented to person, place, and time.   Psychiatric: He has a normal mood and affect. His behavior is normal.     General Musculoskeletal Exam   Gait: antalgic       Right Knee Exam     Comments:  Aquacel dressing the anterior knee is clean, dry and in tact.  There is no excessive swelling or erythema noted about the incision area. Patient  is neurovascularly in tact to the distal extremity. Homans sign is negative.  There is moderate discomfort with range of motion of the knee, to be expected following surgery.  Hemovac in place with minimal drainage.    Left Knee Exam   Left knee exam is normal.       Significant Labs:   Recent Lab Results         02/13/25  0256        Anion Gap 6.0       BUN 26.0       BUN/CREAT RATIO 22       Calcium 8.8       Chloride 106       CO2 28       Creatinine 1.20       eGFR >60  Comment: Estimated GFR calculated using the CKD-EPI creatinine (2021) equation.       Glucose 133       Hematocrit 41.4       Hemoglobin 13.4       INR 1.0       Magnesium  2.00       Potassium 4.5       PT 14.0       Sodium 140         "     All pertinent labs within the past 24 hours have been reviewed.    Significant Imaging: I have reviewed all pertinent imaging results/findings.  Assessment/Plan:     * Primary osteoarthritis of right knee  Postop day 1., right total knee arthroplasty, robotically assisted.    We will discontinue Hemovac drain prior to discharge.    Patient will be discharged to home with home health to include PT, weight-bearing as tolerated with walker.    He will be given prescriptions for Percocet 10/325 mg q.6 hours p.r.n. for pain and Duricef 500 mg b.i.d. for 10 days.    He has resumed his Coumadin.  He will wear bilateral Humberto hose for 6 weeks for DVT prophylaxis.    He will follow up with Dr. Padron in 2 weeks, Aquacel dressing may stay in place until follow up appointment.          ADIS Curran  Orthopedics  [facility] - [facility] Surgical Unit

## 2025-02-13 NOTE — SUBJECTIVE & OBJECTIVE
"Principal Problem:Primary osteoarthritis of right knee    Principal Orthopedic Problem:  Right knee osteoarthritis    Interval History:  The patient is postop day 1. Right total knee arthroplasty, robotically assisted.  The patient participated well with therapy.  He does mention pain but it is tolerable with his pain medication.  He is weight-bearing as tolerated with a walker.  We will discontinue his Hemovac drain as it had minimal drainage.  He will be discharged home with home health to include PT. we have resumed his Coumadin.    Review of patient's allergies indicates:   Allergen Reactions    Cardizem [diltiazem hcl] Other (See Comments)       Current Facility-Administered Medications   Medication    albuterol inhaler 2 puff    atorvastatin tablet 40 mg    bisacodyL suppository 10 mg    docusate sodium capsule 100 mg    fluticasone furoate-vilanteroL 100-25 mcg/dose diskus inhaler 1 puff    fluticasone propionate 50 mcg/actuation nasal spray 50 mcg    hydrALAZINE tablet 25 mg    lactated ringers infusion    melatonin tablet 6 mg    metoprolol succinate (TOPROL-XL) 24 hr tablet 50 mg    morphine injection 4 mg    mupirocin 2 % ointment 1 g    ondansetron injection 4 mg    oxyCODONE immediate release tablet 10 mg    promethazine tablet 25 mg    tamsulosin 24 hr capsule 0.4 mg    torsemide tablet 20 mg    traMADoL tablet 50 mg    warfarin (COUMADIN) tablet 3 mg     Objective:     Vital Signs (Most Recent):  Temp: 98 °F (36.7 °C) (02/13/25 1027)  Pulse: 71 (02/13/25 1027)  Resp: 18 (02/13/25 1114)  BP: 115/71 (02/13/25 1027)  SpO2: (!) 93 % (02/13/25 1027) Vital Signs (24h Range):  Temp:  [97.7 °F (36.5 °C)-98.2 °F (36.8 °C)] 98 °F (36.7 °C)  Pulse:  [] 71  Resp:  [16-20] 18  SpO2:  [93 %-98 %] 93 %  BP: (106-144)/(59-86) 115/71     Weight: 119.7 kg (263 lb 14.3 oz)  Height: 5' 7" (170.2 cm)  Body mass index is 41.33 kg/m².      Intake/Output Summary (Last 24 hours) at 2/13/2025 122  Last data filed at " 2/13/2025 1224  Gross per 24 hour   Intake 2931.58 ml   Output 2300 ml   Net 631.58 ml        General    Constitutional: He is oriented to person, place, and time. He appears well-developed and well-nourished.   HENT:   Head: Normocephalic and atraumatic.   Eyes: Conjunctivae and EOM are normal.   Neck: Neck supple.   Cardiovascular:  Normal rate and intact distal pulses.            Pulmonary/Chest: Effort normal. No respiratory distress.   Neurological: He is alert and oriented to person, place, and time.   Psychiatric: He has a normal mood and affect. His behavior is normal.     General Musculoskeletal Exam   Gait: antalgic       Right Knee Exam     Comments:  Aquacel dressing the anterior knee is clean, dry and in tact.  There is no excessive swelling or erythema noted about the incision area. Patient  is neurovascularly in tact to the distal extremity. Homans sign is negative.  There is moderate discomfort with range of motion of the knee, to be expected following surgery.  Hemovac in place with minimal drainage.    Left Knee Exam   Left knee exam is normal.       Significant Labs:   Recent Lab Results         02/13/25  0256        Anion Gap 6.0       BUN 26.0       BUN/CREAT RATIO 22       Calcium 8.8       Chloride 106       CO2 28       Creatinine 1.20       eGFR >60  Comment: Estimated GFR calculated using the CKD-EPI creatinine (2021) equation.       Glucose 133       Hematocrit 41.4       Hemoglobin 13.4       INR 1.0       Magnesium  2.00       Potassium 4.5       PT 14.0       Sodium 140             All pertinent labs within the past 24 hours have been reviewed.    Significant Imaging: I have reviewed all pertinent imaging results/findings.

## 2025-02-13 NOTE — PLAN OF CARE
02/13/25 1028   Discharge Assessment   Assessment Type Discharge Planning Assessment   Confirmed/corrected address, phone number and insurance Yes   Confirmed Demographics Correct on Facesheet   Source of Information patient;health record   People in Home spouse   Do you expect to return to your current living situation? Yes   Prior to hospitilization cognitive status: Alert/Oriented;No Deficits   Current cognitive status: Alert/Oriented;No Deficits   Walking or Climbing Stairs Difficulty no   Dressing/Bathing Difficulty no   Equipment Currently Used at Home grab bar;raised toilet   Patient currently being followed by outpatient case management? Unable to determine (comments)   Do you currently have service(s) that help you manage your care at home? No   Do you take prescription medications? Yes   Do you have prescription coverage? Yes   Do you have any problems affording any of your prescribed medications? No   Is the patient taking medications as prescribed? yes   Who is going to help you get home at discharge? fly   How do you get to doctors appointments? family or friend will provide   Are you on dialysis? No   Do you take coumadin? No   Discharge Plan A Home Health;Home with family   Discharge Plan B Home Health;Home with family   DME Needed Upon Discharge  walker, rolling   Discharge Plan discussed with: Spouse/sig other;Patient   Transition of Care Barriers None   Physical Activity   On average, how many days per week do you engage in moderate to strenuous exercise (like a brisk walk)? 0 days   On average, how many minutes do you engage in exercise at this level? 0 min   Financial Resource Strain   How hard is it for you to pay for the very basics like food, housing, medical care, and heating? Not very   Housing Stability   In the last 12 months, was there a time when you were not able to pay the mortgage or rent on time? N   At any time in the past 12 months, were you homeless or living in a shelter  (including now)? N   Transportation Needs   Has the lack of transportation kept you from medical appointments, meetings, work or from getting things needed for daily living? No   Food Insecurity   Within the past 12 months, you worried that your food would run out before you got the money to buy more. Never true   Within the past 12 months, the food you bought just didn't last and you didn't have money to get more. Never true   Stress   Do you feel stress - tense, restless, nervous, or anxious, or unable to sleep at night because your mind is troubled all the time - these days? Only a littl   Social Isolation   How often do you feel lonely or isolated from those around you?  Never   Alcohol Use   Q1: How often do you have a drink containing alcohol? Monthly or l   Q2: How many drinks containing alcohol do you have on a typical day when you are drinking? 1 or 2   Q3: How often do you have six or more drinks on one occasion? Never   Utilities   In the past 12 months has the electric, gas, oil, or water company threatened to shut off services in your home? No   Health Literacy   How often do you need to have someone help you when you read instructions, pamphlets, or other written material from your doctor or pharmacy? Sometimes     Professional HH was arranged prior to patient coming into hospital for surgery.  Patient agrees to use this agency.  Referral sent yesterday.  Patient needing a RW for d/c. Orders sent to [facility].  Chaitanya Capone with Dr. Padron to please sign order in [facility] so I can send it, to have orders processed and walker delivered to room for d/c home today.

## 2025-02-14 VITALS
TEMPERATURE: 98 F | HEIGHT: 67 IN | WEIGHT: 263.88 LBS | HEART RATE: 82 BPM | OXYGEN SATURATION: 94 % | RESPIRATION RATE: 18 BRPM | DIASTOLIC BLOOD PRESSURE: 88 MMHG | SYSTOLIC BLOOD PRESSURE: 156 MMHG | BODY MASS INDEX: 41.42 KG/M2

## 2025-02-14 LAB
ANION GAP SERPL CALC-SCNC: 9 MEQ/L
BUN SERPL-MCNC: 24 MG/DL (ref 8.4–25.7)
CALCIUM SERPL-MCNC: 9.1 MG/DL (ref 8.8–10)
CHLORIDE SERPL-SCNC: 104 MMOL/L (ref 98–107)
CO2 SERPL-SCNC: 26 MMOL/L (ref 23–31)
CREAT SERPL-MCNC: 0.96 MG/DL (ref 0.72–1.25)
CREAT/UREA NIT SERPL: 25
GFR SERPLBLD CREATININE-BSD FMLA CKD-EPI: >60 ML/MIN/1.73/M2
GLUCOSE SERPL-MCNC: 131 MG/DL (ref 82–115)
HCT VFR BLD AUTO: 40.1 % (ref 42–52)
HGB BLD-MCNC: 12.8 G/DL (ref 14–18)
POTASSIUM SERPL-SCNC: 4.2 MMOL/L (ref 3.5–5.1)
SODIUM SERPL-SCNC: 139 MMOL/L (ref 136–145)

## 2025-02-14 PROCEDURE — 85014 HEMATOCRIT: CPT | Performed by: PHYSICIAN ASSISTANT

## 2025-02-14 PROCEDURE — 36415 COLL VENOUS BLD VENIPUNCTURE: CPT | Performed by: PHYSICIAN ASSISTANT

## 2025-02-14 PROCEDURE — 94761 N-INVAS EAR/PLS OXIMETRY MLT: CPT

## 2025-02-14 PROCEDURE — 80048 BASIC METABOLIC PNL TOTAL CA: CPT | Performed by: PHYSICIAN ASSISTANT

## 2025-02-14 PROCEDURE — 11000001 HC ACUTE MED/SURG PRIVATE ROOM

## 2025-02-14 PROCEDURE — 94799 UNLISTED PULMONARY SVC/PX: CPT

## 2025-02-14 PROCEDURE — 25000003 PHARM REV CODE 250: Performed by: PHYSICIAN ASSISTANT

## 2025-02-14 PROCEDURE — 25000242 PHARM REV CODE 250 ALT 637 W/ HCPCS: Performed by: PHYSICIAN ASSISTANT

## 2025-02-14 RX ADMIN — HYDRALAZINE HYDROCHLORIDE 25 MG: 25 TABLET ORAL at 09:02

## 2025-02-14 RX ADMIN — OXYCODONE 10 MG: 5 TABLET ORAL at 01:02

## 2025-02-14 RX ADMIN — METOPROLOL SUCCINATE 50 MG: 50 TABLET, EXTENDED RELEASE ORAL at 05:02

## 2025-02-14 RX ADMIN — MUPIROCIN 1 G: 20 OINTMENT TOPICAL at 09:02

## 2025-02-14 RX ADMIN — OXYCODONE 10 MG: 5 TABLET ORAL at 09:02

## 2025-02-14 RX ADMIN — DOCUSATE SODIUM 100 MG: 100 CAPSULE, LIQUID FILLED ORAL at 09:02

## 2025-02-14 RX ADMIN — ALBUTEROL SULFATE 2 PUFF: 90 AEROSOL, METERED RESPIRATORY (INHALATION) at 09:02

## 2025-02-14 NOTE — HOSPITAL COURSE
02/14/2025 postop day 2.  Right total knee arthroplasty.  The patient wife have decided the patient should go to rehab as he is unsteady following his surgery.

## 2025-02-14 NOTE — PLAN OF CARE
02/14/25 0920   Final Note   Assessment Type Final Discharge Note   Anticipated Discharge Disposition Rehab   Hospital Resources/Appts/Education Provided Post-Acute resouces added to AVS   Post-Acute Status   Post-Acute Authorization Placement   Post-Acute Placement Status Set-up Complete/Auth obtained   Discharge Delays None known at this time     Pt d/[facility] today.  They will call [facility] for report and arrange transport for patient.  D/C info sent.

## 2025-02-14 NOTE — SUBJECTIVE & OBJECTIVE
"Principal Problem:Primary osteoarthritis of right knee    Principal Orthopedic Problem:  Right knee osteoarthritis    Interval History:  Postop day 2. Right total knee arthroplasty, robotically assisted.  The patient required a lot of assistance with therapy yesterday and almost fell a couple of times.  He and his wife have decided [facility] would be beneficial for him.  He would benefit from multidisciplinary approach of both medicine and physical therapy.  His past medical history includes acute renal failure, asthma, COPD, osteoarthritis, AFib, BPH and hyperlipidemia.    Review of patient's allergies indicates:   Allergen Reactions    Cardizem [diltiazem hcl] Other (See Comments)       Current Facility-Administered Medications   Medication    albuterol inhaler 2 puff    atorvastatin tablet 40 mg    bisacodyL suppository 10 mg    docusate sodium capsule 100 mg    fluticasone furoate-vilanteroL 100-25 mcg/dose diskus inhaler 1 puff    fluticasone propionate 50 mcg/actuation nasal spray 50 mcg    hydrALAZINE tablet 25 mg    melatonin tablet 6 mg    metoprolol succinate (TOPROL-XL) 24 hr tablet 50 mg    morphine injection 4 mg    mupirocin 2 % ointment 1 g    ondansetron injection 4 mg    oxyCODONE immediate release tablet 10 mg    promethazine tablet 25 mg    tamsulosin 24 hr capsule 0.4 mg    torsemide tablet 20 mg    traMADoL tablet 50 mg    warfarin (COUMADIN) tablet 3 mg     Objective:     Vital Signs (Most Recent):  Temp: 98.4 °F (36.9 °C) (02/14/25 0700)  Pulse: 80 (02/14/25 0700)  Resp: 16 (02/14/25 0904)  BP: (!) 156/88 (02/14/25 0700)  SpO2: (!) 94 % (02/14/25 0745) Vital Signs (24h Range):  Temp:  [98 °F (36.7 °C)-98.6 °F (37 °C)] 98.4 °F (36.9 °C)  Pulse:  [66-84] 80  Resp:  [16-20] 16  SpO2:  [91 %-97 %] 94 %  BP: (115-157)/(71-90) 156/88     Weight: 119.7 kg (263 lb 14.3 oz)  Height: 5' 7" (170.2 cm)  Body mass index is 41.33 kg/m².      Intake/Output Summary (Last 24 hours) at 2/14/2025 0921  Last " data filed at 2/14/2025 0632  Gross per 24 hour   Intake 537 ml   Output 1200 ml   Net -663 ml        General    Constitutional: He is oriented to person, place, and time. He appears well-developed and well-nourished.   HENT:   Head: Normocephalic and atraumatic.   Eyes: Conjunctivae and EOM are normal.   Neck: Neck supple.   Cardiovascular:  Normal rate and intact distal pulses.            Pulmonary/Chest: Effort normal. No respiratory distress.   Neurological: He is alert and oriented to person, place, and time.   Psychiatric: He has a normal mood and affect. His behavior is normal.     General Musculoskeletal Exam   Gait: antalgic       Right Knee Exam     Comments:  Aquacel dressing the anterior knee is clean, dry and in tact.  There is no excessive swelling or erythema noted about the incision area. Patient  is neurovascularly in tact to the distal extremity. Homans sign is negative.  There is moderate discomfort with range of motion of the knee, to be expected following surgery.     Left Knee Exam   Left knee exam is normal.       Significant Labs:   Recent Lab Results         02/14/25  0345        Anion Gap 9.0       BUN 24.0       BUN/CREAT RATIO 25       Calcium 9.1       Chloride 104       CO2 26       Creatinine 0.96       eGFR >60  Comment: Estimated GFR calculated using the CKD-EPI creatinine (2021) equation.       Glucose 131       Hematocrit 40.1       Hemoglobin 12.8       Potassium 4.2       Sodium 139             All pertinent labs within the past 24 hours have been reviewed.    Significant Imaging: None

## 2025-02-14 NOTE — PROGRESS NOTES
[facility] Surgical Unit  Orthopedics  Progress Note    Patient Name: Reid Mcqueen  MRN: [id number]  Admission Date: 2/12/2025  Hospital Length of Stay: 0 days  Attending Provider: Marcelo Padron MD  Primary Care Provider: Adán Unger III, MD  Follow-up For: Procedure(s) (LRB):  ROBOTIC ARTHROPLASTY, KNEE, TOTAL (Right)    Post-Operative Day: 2 Days Post-Op  Subjective:     Principal Problem:Primary osteoarthritis of right knee    Principal Orthopedic Problem:  Right knee osteoarthritis    Interval History:  Postop day 2. Right total knee arthroplasty, robotically assisted.  The patient required a lot of assistance with therapy yesterday and almost fell a couple of times.  He and his wife have decided [facility] re[facility] would be beneficial for him.  He would benefit from multidisciplinary approach of both medicine and physical therapy.  His past medical history includes acute renal failure, asthma, COPD, osteoarthritis, AFib, BPH and hyperlipidemia.    Review of patient's allergies indicates:   Allergen Reactions    Cardizem [diltiazem hcl] Other (See Comments)       Current Facility-Administered Medications   Medication    albuterol inhaler 2 puff    atorvastatin tablet 40 mg    bisacodyL suppository 10 mg    docusate sodium capsule 100 mg    fluticasone furoate-vilanteroL 100-25 mcg/dose diskus inhaler 1 puff    fluticasone propionate 50 mcg/actuation nasal spray 50 mcg    hydrALAZINE tablet 25 mg    melatonin tablet 6 mg    metoprolol succinate (TOPROL-XL) 24 hr tablet 50 mg    morphine injection 4 mg    mupirocin 2 % ointment 1 g    ondansetron injection 4 mg    oxyCODONE immediate release tablet 10 mg    promethazine tablet 25 mg    tamsulosin 24 hr capsule 0.4 mg    torsemide tablet 20 mg    traMADoL tablet 50 mg    warfarin (COUMADIN) tablet 3 mg     Objective:     Vital Signs (Most Recent):  Temp: 98.4 °F (36.9 °C) (02/14/25 0700)  Pulse: 80 (02/14/25 0700)  Resp: 16  "(02/14/25 0904)  BP: (!) 156/88 (02/14/25 0700)  SpO2: (!) 94 % (02/14/25 0745) Vital Signs (24h Range):  Temp:  [98 °F (36.7 °C)-98.6 °F (37 °C)] 98.4 °F (36.9 °C)  Pulse:  [66-84] 80  Resp:  [16-20] 16  SpO2:  [91 %-97 %] 94 %  BP: (115-157)/(71-90) 156/88     Weight: 119.7 kg (263 lb 14.3 oz)  Height: 5' 7" (170.2 cm)  Body mass index is 41.33 kg/m².      Intake/Output Summary (Last 24 hours) at 2/14/2025 0921  Last data filed at 2/14/2025 0632  Gross per 24 hour   Intake 537 ml   Output 1200 ml   Net -663 ml        General    Constitutional: He is oriented to person, place, and time. He appears well-developed and well-nourished.   HENT:   Head: Normocephalic and atraumatic.   Eyes: Conjunctivae and EOM are normal.   Neck: Neck supple.   Cardiovascular:  Normal rate and intact distal pulses.            Pulmonary/Chest: Effort normal. No respiratory distress.   Neurological: He is alert and oriented to person, place, and time.   Psychiatric: He has a normal mood and affect. His behavior is normal.     General Musculoskeletal Exam   Gait: antalgic       Right Knee Exam     Comments:  Aquacel dressing the anterior knee is clean, dry and in tact.  There is no excessive swelling or erythema noted about the incision area. Patient  is neurovascularly in tact to the distal extremity. Homans sign is negative.  There is moderate discomfort with range of motion of the knee, to be expected following surgery.     Left Knee Exam   Left knee exam is normal.       Significant Labs:   Recent Lab Results         02/14/25  0345        Anion Gap 9.0       BUN 24.0       BUN/CREAT RATIO 25       Calcium 9.1       Chloride 104       CO2 26       Creatinine 0.96       eGFR >60  Comment: Estimated GFR calculated using the CKD-EPI creatinine (2021) equation.       Glucose 131       Hematocrit 40.1       Hemoglobin 12.8       Potassium 4.2       Sodium 139             All pertinent labs within the past 24 hours have been " reviewed.    Significant Imaging: None  Assessment/Plan:     * Primary osteoarthritis of right knee  Postop day 2., right total knee arthroplasty, robotically assisted.    Continue current medical management and PT, weight-bearing as tolerated with walker.    Awaiting approval/acceptance to [facility].  He will be given prescriptions for Percocet 10/325 mg q.6 hours p.r.n. for pain and Duricef 500 mg b.i.d. for 10 days.    He has resumed his Coumadin.  He will wear bilateral Humberto hose for 6 weeks for DVT prophylaxis.    He will follow up with Dr. Padron in 2 weeks, Aquacel dressing may stay in place until follow up appointment.          ADIS Curran  Orthopedics  [facility]

## 2025-02-14 NOTE — ASSESSMENT & PLAN NOTE
Postop day 2., right total knee arthroplasty, robotically assisted.    Continue current medical management and PT, weight-bearing as tolerated with walker.    Awaiting approval/acceptance to [facility].  He will be given prescriptions for Percocet 10/325 mg q.6 hours p.r.n. for pain and Duricef 500 mg b.i.d. for 10 days.    He has resumed his Coumadin.  He will wear bilateral Humberto hose for 6 weeks for DVT prophylaxis.    He will follow up with Dr. Padron in 2 weeks, Aquacel dressing may stay in place until follow up appointment.

## 2025-02-20 ENCOUNTER — HOSPITAL ENCOUNTER (OUTPATIENT)
Dept: RADIOLOGY | Facility: HOSPITAL | Age: 70
Discharge: HOME | End: 2025-02-20
Attending: PHYSICAL MEDICINE & REHABILITATION
Payer: MEDICARE

## 2025-02-20 DIAGNOSIS — M17.11 DEGENERATIVE ARTHRITIS OF RIGHT KNEE: ICD-10-CM

## 2025-02-20 PROCEDURE — 93971 EXTREMITY STUDY: CPT | Mod: TC,RT

## 2025-02-25 PROBLEM — Z96.651 HISTORY OF TOTAL RIGHT KNEE REPLACEMENT: Status: ACTIVE | Noted: 2025-02-25

## 2025-03-20 ENCOUNTER — LAB VISIT (OUTPATIENT)
Dept: LAB | Facility: HOSPITAL | Age: 70
End: 2025-03-20
Attending: INTERNAL MEDICINE
Payer: MEDICARE

## 2025-03-20 DIAGNOSIS — I10 ESSENTIAL HYPERTENSION, MALIGNANT: ICD-10-CM

## 2025-03-20 DIAGNOSIS — N17.9 ACUTE KIDNEY FAILURE, UNSPECIFIED: ICD-10-CM

## 2025-03-20 DIAGNOSIS — G47.30 INSOMNIA WITH SLEEP APNEA: ICD-10-CM

## 2025-03-20 DIAGNOSIS — I48.91 ATRIAL FIBRILLATION: ICD-10-CM

## 2025-03-20 DIAGNOSIS — R60.9 EDEMA, UNSPECIFIED TYPE: Primary | ICD-10-CM

## 2025-03-20 DIAGNOSIS — N25.81 SECONDARY HYPERPARATHYROIDISM OF RENAL ORIGIN: ICD-10-CM

## 2025-03-20 DIAGNOSIS — G47.00 INSOMNIA WITH SLEEP APNEA: ICD-10-CM

## 2025-03-20 DIAGNOSIS — J44.9 VANISHING LUNG: ICD-10-CM

## 2025-03-20 LAB
ALBUMIN SERPL-MCNC: 4.4 G/DL (ref 3.4–5)
BASOPHILS # BLD AUTO: 0.02 X10(3)/MCL (ref 0.01–0.08)
BASOPHILS NFR BLD AUTO: 0.2 % (ref 0.1–1.2)
BILIRUB UR QL STRIP.AUTO: NEGATIVE
BUN SERPL-MCNC: 22 MG/DL (ref 7–20)
CALCIUM SERPL-MCNC: 9.6 MG/DL (ref 8.4–10.2)
CALCIUM SERPL-MCNC: 9.6 MG/DL (ref 8.4–10.2)
CHLORIDE SERPL-SCNC: 106 MMOL/L (ref 98–110)
CLARITY UR: CLEAR
CO2 SERPL-SCNC: 30 MMOL/L (ref 21–32)
COLOR UR AUTO: YELLOW
CREAT SERPL-MCNC: 1.03 MG/DL (ref 0.66–1.25)
CREAT UR-MCNC: 130.1 MG/DL
CREAT/UREA NIT SERPL: 21 (ref 12–20)
EOSINOPHIL # BLD AUTO: 0 X10(3)/MCL (ref 0.04–0.54)
EOSINOPHIL NFR BLD AUTO: 0 % (ref 0.7–7)
ERYTHROCYTE [DISTWIDTH] IN BLOOD BY AUTOMATED COUNT: 13.2 %
FERRITIN SERPL-MCNC: 199 NG/ML (ref 17.9–464)
GFR SERPLBLD CREATININE-BSD FMLA CKD-EPI: 78 ML/MIN/1.73/M2
GLUCOSE SERPL-MCNC: 80 MG/DL (ref 70–115)
GLUCOSE UR QL STRIP: NEGATIVE
HCT VFR BLD AUTO: 42.3 % (ref 36–52)
HGB BLD-MCNC: 13.9 G/DL (ref 13–18)
HGB UR QL STRIP: NEGATIVE
IMM GRANULOCYTES # BLD AUTO: 0.03 X10(3)/MCL (ref 0–0.03)
IMM GRANULOCYTES NFR BLD AUTO: 0.3 % (ref 0–0.5)
KETONES UR QL STRIP: NEGATIVE
LEUKOCYTE ESTERASE UR QL STRIP: NEGATIVE
LYMPHOCYTES # BLD AUTO: 2.11 X10(3)/MCL (ref 1.32–3.57)
LYMPHOCYTES NFR BLD AUTO: 22.7 % (ref 20–55)
MCH RBC QN AUTO: 30.4 PG (ref 27–34)
MCHC RBC AUTO-ENTMCNC: 32.9 G/DL (ref 31–37)
MCV RBC AUTO: 92.6 FL (ref 79–99)
MONOCYTES # BLD AUTO: 0.94 X10(3)/MCL (ref 0.3–0.82)
MONOCYTES NFR BLD AUTO: 10.1 % (ref 4.7–12.5)
NEUTROPHILS # BLD AUTO: 6.19 X10(3)/MCL (ref 1.78–5.38)
NEUTROPHILS NFR BLD AUTO: 66.7 % (ref 37–73)
NITRITE UR QL STRIP: NEGATIVE
NRBC BLD AUTO-RTO: 0 %
PH UR STRIP: 6 [PH]
PHOSPHATE SERPL-MCNC: 3.3 MG/DL (ref 2.5–4.9)
PLATELET # BLD AUTO: 221 X10(3)/MCL (ref 140–371)
PMV BLD AUTO: 9.5 FL (ref 9.4–12.4)
POTASSIUM SERPL-SCNC: 4.3 MMOL/L (ref 3.5–5.1)
PROT UR QL STRIP: NEGATIVE
PROT UR STRIP-MCNC: <5 MG/DL
PTH-INTACT SERPL-MCNC: 74.1 PG/ML (ref 14–73)
RBC # BLD AUTO: 4.57 X10(6)/MCL (ref 4–6)
SODIUM SERPL-SCNC: 142 MMOL/L (ref 136–145)
SP GR UR STRIP.AUTO: 1.01 (ref 1–1.03)
URINE PROTEIN/CREATININE RATIO (OLG): <0
UROBILINOGEN UR STRIP-ACNC: 1
WBC # BLD AUTO: 9.29 X10(3)/MCL (ref 4–11.5)

## 2025-03-20 PROCEDURE — 80069 RENAL FUNCTION PANEL: CPT

## 2025-03-20 PROCEDURE — 82728 ASSAY OF FERRITIN: CPT

## 2025-03-20 PROCEDURE — 83550 IRON BINDING TEST: CPT

## 2025-03-20 PROCEDURE — 84156 ASSAY OF PROTEIN URINE: CPT

## 2025-03-20 PROCEDURE — 83970 ASSAY OF PARATHORMONE: CPT

## 2025-03-20 PROCEDURE — 85025 COMPLETE CBC W/AUTO DIFF WBC: CPT

## 2025-03-20 PROCEDURE — 82043 UR ALBUMIN QUANTITATIVE: CPT

## 2025-03-20 PROCEDURE — 81003 URINALYSIS AUTO W/O SCOPE: CPT

## 2025-03-20 PROCEDURE — 82306 VITAMIN D 25 HYDROXY: CPT

## 2025-03-20 PROCEDURE — 36415 COLL VENOUS BLD VENIPUNCTURE: CPT

## 2025-03-21 LAB
25(OH)D3+25(OH)D2 SERPL-MCNC: 55 NG/ML (ref 30–80)
CREAT UR-MCNC: 123.1 MG/DL (ref 63–166)
IRON SATN MFR SERPL: 26 % (ref 20–50)
IRON SERPL-MCNC: 58 UG/DL (ref 65–175)
MICROALBUMIN UR-MCNC: 5.4 UG/ML
MICROALBUMIN/CREAT RATIO PNL UR: 4.4 MG/GM CR (ref 0–30)
TIBC SERPL-MCNC: 162 UG/DL (ref 60–240)
TIBC SERPL-MCNC: 220 UG/DL (ref 250–450)
TRANSFERRIN SERPL-MCNC: 191 MG/DL (ref 163–344)

## 2025-04-24 ENCOUNTER — HOSPITAL ENCOUNTER (OUTPATIENT)
Dept: RADIOLOGY | Facility: HOSPITAL | Age: 70
Discharge: HOME | End: 2025-04-24
Attending: SPECIALIST
Payer: MEDICARE

## 2025-04-24 DIAGNOSIS — R22.41 LOCALIZED SWELLING, MASS, OR LUMP OF LOWER EXTREMITY, RIGHT: ICD-10-CM

## 2025-04-24 PROCEDURE — 93971 EXTREMITY STUDY: CPT | Mod: TC,RT

## 2025-05-07 ENCOUNTER — HOSPITAL ENCOUNTER (OUTPATIENT)
Dept: PREADMISSION TESTING | Facility: HOSPITAL | Age: 70
Discharge: HOME | End: 2025-05-07
Attending: FAMILY MEDICINE
Payer: MEDICARE

## 2025-05-07 ENCOUNTER — ANESTHESIA EVENT (OUTPATIENT)
Dept: GASTROENTEROLOGY | Facility: HOSPITAL | Age: 70
End: 2025-05-07
Payer: MEDICARE

## 2025-05-07 VITALS — HEIGHT: 67 IN | BODY MASS INDEX: 41.28 KG/M2 | WEIGHT: 263 LBS

## 2025-05-07 DIAGNOSIS — Z12.11 COLON CANCER SCREENING: ICD-10-CM

## 2025-05-07 DIAGNOSIS — Z12.11 SCREEN FOR COLON CANCER: Primary | ICD-10-CM

## 2025-05-07 RX ORDER — SODIUM CHLORIDE, SODIUM LACTATE, POTASSIUM CHLORIDE, CALCIUM CHLORIDE 600; 310; 30; 20 MG/100ML; MG/100ML; MG/100ML; MG/100ML
INJECTION, SOLUTION INTRAVENOUS CONTINUOUS
Status: CANCELLED | OUTPATIENT
Start: 2025-05-09

## 2025-05-07 NOTE — DISCHARGE INSTRUCTIONS

## 2025-05-07 NOTE — ANESTHESIA PREPROCEDURE EVALUATION
05/07/2025  Reid Mcqueen is a 69 y.o., male presents for colonoscopy        Anesthesia History    No specialty history recorded    Other Medical History   Asthma Osteoporosis   Kidney stone Osteoarthritis   Allergy A-fib   COPD (chronic obstructive pulmonary disease) BPH (benign prostatic hyperplasia)   HLD (hyperlipidemia) Hypokalemia   Acute renal failure Unspecified osteoarthritis, unspecified site   Primary osteoarthritis of right knee        Surgical History      CATARACT EXTRACTION, BILATERAL ARTHROSCOPY OF BOTH KNEES   ROBOTIC ARTHROPLASTY, KNEE          Prescription Medications  Within last 14 days from 05/07/25   Last Taken Last Updated   albuterol (PROVENTIL/VENTOLIN HFA) 90 mcg/actuation inhaler    2/11/2025 02/12/25 0510   atorvastatin (LIPITOR) 40 MG tablet    2/11/2025 02/12/25 0510   budesonide-formoterol 160-4.5 mcg (SYMBICORT) 160-4.5 mcg/actuation HFAA    2/11/2025 02/12/25 0510   carboxymethylcellulose (REFRESH PLUS) 0.5 % Dpet    2/11/2025 02/12/25 0510   fexofenadine (ALLEGRA) 180 MG tablet    2/11/2025 02/12/25 0510   fluticasone (VERAMYST) 27.5 mcg/actuation nasal spray    2/11/2025 02/12/25 0510   hydrALAZINE (APRESOLINE) 25 MG tablet    2/12/2025 at Morning 02/12/25 0510   metoprolol succinate (TOPROL-XL) 50 MG 24 hr tablet    2/12/2025 at Morning 02/12/25 0510   oxyCODONE-acetaminophen (PERCOCET)  mg per tablet        tamsulosin (FLOMAX) 0.4 mg Cap    2/11/2025 02/12/25 0510   torsemide (DEMADEX) 20 MG Tab    2/11/2025 02/12/25 0510   vitamin D (VITAMIN D3) 1000 units Tab    2/11/2025 02/12/25 0510   warfarin (COUMADIN) 2.5 MG tablet    2/7/2025 02/12/25 0510             Pre-op Assessment    I have reviewed the Patient Summary Reports.     I have reviewed the Nursing Notes. I have reviewed the NPO Status.   I have reviewed the Medications.     Review of  Systems  Anesthesia Hx:  No problems with previous Anesthesia             Denies Family Hx of Anesthesia complications.    Denies Personal Hx of Anesthesia complications.                    Social:  Non-Smoker       Hematology/Oncology:  Hematology Normal   Oncology Normal                                   EENT/Dental:  EENT/Dental Normal           Cardiovascular:  Exercise tolerance: poor   Hypertension    Dysrhythmias atrial fibrillation      hyperlipidemia   ECG has been reviewed.                        Atrial Fibrillation     Pulmonary:   COPD Asthma    Sleep Apnea   Asthma:   Chronic Obstructive Pulmonary Disease (COPD):                 Education provided regarding risk of obstructive sleep apnea            Renal/:  Chronic Renal Disease        Kidney Function/Disease             Hepatic/GI:      Liver Disease,            Liver Disease        Musculoskeletal:  Arthritis        Arthritis          Neurological:  Neurology Normal              Arthritis                           Endocrine:        Obesity / BMI > 30  Dermatological:  Skin Normal    Psych:  Psychiatric Normal                  Physical Exam  General: Well nourished, Cooperative, Alert and Oriented    Airway:  Mouth Opening: Normal  TM Distance: Normal  Tongue: Normal  Neck ROM: Normal ROM      Anesthesia Plan  Type of Anesthesia, risks & benefits discussed:    Anesthesia Type: MAC  Intra-op Monitoring Plan: Standard ASA Monitors  Post Op Pain Control Plan:   (medical reason for not using multimodal pain management)  Induction:  IV  Airway Plan: Direct, Post-Induction  Informed Consent: Informed consent signed with the Patient and all parties understand the risks and agree with anesthesia plan.  All questions answered. Patient consented to blood products? Yes  ASA Score: 3  Day of Surgery Review of History & Physical: H&P Update referred to the surgeon/provider.I have interviewed and examined the patient. I have reviewed the patient's H&P dated:  There are no significant changes.     Ready For Surgery From Anesthesia Perspective.     .

## 2025-05-09 ENCOUNTER — HOSPITAL ENCOUNTER (OUTPATIENT)
Dept: GASTROENTEROLOGY | Facility: HOSPITAL | Age: 70
Discharge: HOME | End: 2025-05-09
Attending: FAMILY MEDICINE
Payer: MEDICARE

## 2025-05-09 ENCOUNTER — ANESTHESIA (OUTPATIENT)
Dept: GASTROENTEROLOGY | Facility: HOSPITAL | Age: 70
End: 2025-05-09
Payer: MEDICARE

## 2025-05-09 VITALS
WEIGHT: 263 LBS | SYSTOLIC BLOOD PRESSURE: 150 MMHG | OXYGEN SATURATION: 98 % | RESPIRATION RATE: 20 BRPM | TEMPERATURE: 98 F | HEART RATE: 63 BPM | DIASTOLIC BLOOD PRESSURE: 80 MMHG | BODY MASS INDEX: 41.28 KG/M2 | HEIGHT: 67 IN

## 2025-05-09 DIAGNOSIS — Z12.11 SCREEN FOR COLON CANCER: ICD-10-CM

## 2025-05-09 DIAGNOSIS — Z12.11 COLON CANCER SCREENING: ICD-10-CM

## 2025-05-09 LAB
APTT PPP: 30.3 SECONDS (ref 23–29.4)
INR PPP: 1.3
PROTHROMBIN TIME: 13.1 SECONDS (ref 9.3–11.9)

## 2025-05-09 PROCEDURE — 63600175 PHARM REV CODE 636 W HCPCS: Performed by: NURSE ANESTHETIST, CERTIFIED REGISTERED

## 2025-05-09 PROCEDURE — 45385 COLONOSCOPY W/LESION REMOVAL: CPT | Mod: PT | Performed by: FAMILY MEDICINE

## 2025-05-09 PROCEDURE — 36415 COLL VENOUS BLD VENIPUNCTURE: CPT | Performed by: FAMILY MEDICINE

## 2025-05-09 PROCEDURE — 45380 COLONOSCOPY AND BIOPSY: CPT | Mod: XS,PT | Performed by: FAMILY MEDICINE

## 2025-05-09 PROCEDURE — 37000009 HC ANESTHESIA EA ADD 15 MINS

## 2025-05-09 PROCEDURE — 85730 THROMBOPLASTIN TIME PARTIAL: CPT | Performed by: FAMILY MEDICINE

## 2025-05-09 PROCEDURE — 63600175 PHARM REV CODE 636 W HCPCS: Performed by: FAMILY MEDICINE

## 2025-05-09 PROCEDURE — 85610 PROTHROMBIN TIME: CPT | Performed by: FAMILY MEDICINE

## 2025-05-09 PROCEDURE — 37000008 HC ANESTHESIA 1ST 15 MINUTES

## 2025-05-09 PROCEDURE — 27201423 OPTIME MED/SURG SUP & DEVICES STERILE SUPPLY

## 2025-05-09 RX ORDER — CEFAZOLIN 2 G/1
2 INJECTION, POWDER, FOR SOLUTION INTRAMUSCULAR; INTRAVENOUS
Status: DISCONTINUED | OUTPATIENT
Start: 2025-05-09 | End: 2025-05-10 | Stop reason: HOSPADM

## 2025-05-09 RX ORDER — PROPOFOL 10 MG/ML
VIAL (ML) INTRAVENOUS
Status: DISCONTINUED | OUTPATIENT
Start: 2025-05-09 | End: 2025-05-09

## 2025-05-09 RX ORDER — SODIUM CHLORIDE, SODIUM LACTATE, POTASSIUM CHLORIDE, CALCIUM CHLORIDE 600; 310; 30; 20 MG/100ML; MG/100ML; MG/100ML; MG/100ML
INJECTION, SOLUTION INTRAVENOUS CONTINUOUS
Status: DISCONTINUED | OUTPATIENT
Start: 2025-05-09 | End: 2025-05-10 | Stop reason: HOSPADM

## 2025-05-09 RX ORDER — LIDOCAINE HYDROCHLORIDE 20 MG/ML
INJECTION INTRAVENOUS
Status: DISCONTINUED | OUTPATIENT
Start: 2025-05-09 | End: 2025-05-09

## 2025-05-09 RX ADMIN — PROPOFOL 120 MG: 10 INJECTION, EMULSION INTRAVENOUS at 11:05

## 2025-05-09 RX ADMIN — PROPOFOL 50 MG: 10 INJECTION, EMULSION INTRAVENOUS at 11:05

## 2025-05-09 RX ADMIN — SODIUM CHLORIDE, POTASSIUM CHLORIDE, SODIUM LACTATE AND CALCIUM CHLORIDE: 600; 310; 30; 20 INJECTION, SOLUTION INTRAVENOUS at 09:05

## 2025-05-09 RX ADMIN — PROPOFOL 80 MG: 10 INJECTION, EMULSION INTRAVENOUS at 11:05

## 2025-05-09 RX ADMIN — CEFAZOLIN 2 G: 2 INJECTION, POWDER, FOR SOLUTION INTRAMUSCULAR; INTRAVENOUS at 11:05

## 2025-05-09 RX ADMIN — LIDOCAINE HYDROCHLORIDE 100 MG: 20 INJECTION, SOLUTION INTRAVENOUS at 11:05

## 2025-05-09 NOTE — DISCHARGE INSTRUCTIONS
Follow-up with Dr Muhammad's  Diet: as tolerated  Activity:  decrease activity today, no driving today, resume all activity tomorrow  Notify MD:  increased swelling of abdomen, excessive nausea/vomiting, excessive bright red bleeding from rectum  Medications:  continue your home medications. Keep a list of your home medications at all times for emergencies.

## 2025-05-09 NOTE — ANESTHESIA POSTPROCEDURE EVALUATION
Anesthesia Post Evaluation    Patient: Reid Mcqueen    Procedure(s) Performed: * No procedures listed *    Final Anesthesia Type: MAC      Patient location during evaluation: floor  Patient participation: Yes- Able to Participate  Level of consciousness: awake and alert, awake and oriented  Post-procedure vital signs: reviewed and stable  Pain management: adequate  Airway patency: patent    PONV status at discharge: No PONV  Anesthetic complications: no      Cardiovascular status: blood pressure returned to baseline  Respiratory status: unassisted, room air and spontaneous ventilation  Hydration status: euvolemic  Follow-up not needed.              Vitals Value Taken Time   /80 05/09/25 08:45   Temp 36.6 °C (97.9 °F) 05/09/25 08:45   Pulse 72 05/09/25 08:45   Resp 20 05/09/25 08:45   SpO2 98 % 05/09/25 08:45         No case tracking events are documented in the log.      Pain/Madeline Score: No data recorded

## 2025-05-09 NOTE — DISCHARGE SUMMARY
[facility]  [facility]  Short Stay    Colonoscopy      OUTCOME: Patient tolerated treatment/procedure well without complication and is now ready for discharge.    DISPOSITION: Home or Self Care    FINAL DIAGNOSIS:  <principal problem not specified>    FOLLOWUP: In clinic    DISCHARGE INSTRUCTIONS:  No discharge procedures on file.     TIME SPENT ON DISCHARGE:  minutes

## 2025-08-25 ENCOUNTER — LAB VISIT (OUTPATIENT)
Dept: LAB | Facility: HOSPITAL | Age: 70
End: 2025-08-25
Attending: INTERNAL MEDICINE
Payer: MEDICARE

## 2025-08-25 DIAGNOSIS — G47.30 INSOMNIA WITH SLEEP APNEA: ICD-10-CM

## 2025-08-25 DIAGNOSIS — I48.91 ATRIAL FIBRILLATION: ICD-10-CM

## 2025-08-25 DIAGNOSIS — R68.89 OTHER GENERAL SYMPTOMS AND SIGNS: ICD-10-CM

## 2025-08-25 DIAGNOSIS — M17.11 UNILATERAL PRIMARY OSTEOARTHRITIS, RIGHT KNEE: ICD-10-CM

## 2025-08-25 DIAGNOSIS — J44.9 VANISHING LUNG: ICD-10-CM

## 2025-08-25 DIAGNOSIS — L03.116 CELLULITIS OF LEFT LOWER EXTREMITY: ICD-10-CM

## 2025-08-25 DIAGNOSIS — G47.00 INSOMNIA WITH SLEEP APNEA: ICD-10-CM

## 2025-08-25 DIAGNOSIS — N25.81 SECONDARY HYPERPARATHYROIDISM OF RENAL ORIGIN: ICD-10-CM

## 2025-08-25 DIAGNOSIS — I48.91 UNSPECIFIED ATRIAL FIBRILLATION: ICD-10-CM

## 2025-08-25 DIAGNOSIS — R60.9 EDEMA, UNSPECIFIED TYPE: Primary | ICD-10-CM

## 2025-08-25 DIAGNOSIS — I10 ESSENTIAL HYPERTENSION, MALIGNANT: ICD-10-CM

## 2025-08-25 DIAGNOSIS — N17.9 ACUTE KIDNEY FAILURE, UNSPECIFIED: ICD-10-CM

## 2025-08-25 LAB
25(OH)D3+25(OH)D2 SERPL-MCNC: 59 NG/ML (ref 30–80)
ALBUMIN SERPL-MCNC: 3.9 G/DL (ref 3.4–4.8)
ALBUMIN/CREAT UR: 12.1 MG/GM CR (ref 0–30)
BASOPHILS # BLD AUTO: 0.02 X10(3)/MCL (ref 0.01–0.08)
BASOPHILS NFR BLD AUTO: 0.3 % (ref 0.1–1.2)
BILIRUB UR QL STRIP.AUTO: NEGATIVE
BUN SERPL-MCNC: 21 MG/DL (ref 8.4–25.7)
CALCIUM SERPL-MCNC: 9.3 MG/DL (ref 8.8–10)
CHLORIDE SERPL-SCNC: 109 MMOL/L (ref 98–107)
CLARITY UR: CLEAR
CO2 SERPL-SCNC: 24 MMOL/L (ref 23–31)
COLOR UR AUTO: YELLOW
CREAT SERPL-MCNC: 1.03 MG/DL (ref 0.72–1.25)
CREAT UR-MCNC: 153.8 MG/DL (ref 63–166)
CREAT UR-MCNC: 154.3 MG/DL (ref 63–166)
CREAT/UREA NIT SERPL: 20
EOSINOPHIL # BLD AUTO: 0.14 X10(3)/MCL (ref 0.04–0.54)
EOSINOPHIL NFR BLD AUTO: 2.2 % (ref 0.7–7)
ERYTHROCYTE [DISTWIDTH] IN BLOOD BY AUTOMATED COUNT: 14.1 %
FERRITIN SERPL-MCNC: 205.09 NG/ML (ref 21.81–274.66)
GFR SERPLBLD CREATININE-BSD FMLA CKD-EPI: 78 ML/MIN/1.73/M2
GLUCOSE SERPL-MCNC: 111 MG/DL (ref 82–115)
GLUCOSE UR QL STRIP: NEGATIVE
HCT VFR BLD AUTO: 46.1 % (ref 36–52)
HGB BLD-MCNC: 14.9 G/DL (ref 13–18)
HGB UR QL STRIP: NEGATIVE
IMM GRANULOCYTES # BLD AUTO: 0.04 X10(3)/MCL (ref 0–0.03)
IMM GRANULOCYTES NFR BLD AUTO: 0.6 % (ref 0–0.5)
IRON SATN MFR SERPL: 35 % (ref 20–50)
IRON SERPL-MCNC: 87 UG/DL (ref 65–175)
KETONES UR QL STRIP: NEGATIVE
LEUKOCYTE ESTERASE UR QL STRIP: NEGATIVE
LYMPHOCYTES # BLD AUTO: 1.78 X10(3)/MCL (ref 1.32–3.57)
LYMPHOCYTES NFR BLD AUTO: 28.1 % (ref 20–55)
MCH RBC QN AUTO: 29.8 PG (ref 27–34)
MCHC RBC AUTO-ENTMCNC: 32.3 G/DL (ref 31–37)
MCV RBC AUTO: 92.2 FL (ref 79–99)
MICROALBUMIN UR-MCNC: 18.7 UG/ML
MONOCYTES # BLD AUTO: 0.56 X10(3)/MCL (ref 0.3–0.82)
MONOCYTES NFR BLD AUTO: 8.8 % (ref 4.7–12.5)
NEUTROPHILS # BLD AUTO: 3.8 X10(3)/MCL (ref 1.78–5.38)
NEUTROPHILS NFR BLD AUTO: 60 % (ref 37–73)
NITRITE UR QL STRIP: NEGATIVE
NRBC BLD AUTO-RTO: 0 %
PH UR STRIP: 6 [PH]
PHOSPHATE SERPL-MCNC: 2.2 MG/DL (ref 2.3–4.7)
PLATELET # BLD AUTO: 190 X10(3)/MCL (ref 140–371)
PMV BLD AUTO: 9.7 FL (ref 9.4–12.4)
POTASSIUM SERPL-SCNC: 4.2 MMOL/L (ref 3.5–5.1)
PROT UR QL STRIP: NEGATIVE
PROT UR STRIP-MCNC: 15 MG/DL
PTH-INTACT SERPL-MCNC: 106 PG/ML (ref 8.7–77)
RBC # BLD AUTO: 5 X10(6)/MCL (ref 4–6)
SODIUM SERPL-SCNC: 141 MMOL/L (ref 136–145)
SP GR UR STRIP.AUTO: 1.02 (ref 1–1.03)
TIBC SERPL-MCNC: 164 UG/DL (ref 60–240)
TIBC SERPL-MCNC: 251 UG/DL (ref 250–450)
URINE PROTEIN/CREATININE RATIO (OLG): 0.1
UROBILINOGEN UR STRIP-ACNC: 0.2
WBC # BLD AUTO: 6.34 X10(3)/MCL (ref 4–11.5)

## 2025-08-25 PROCEDURE — 81003 URINALYSIS AUTO W/O SCOPE: CPT

## 2025-08-25 PROCEDURE — 82728 ASSAY OF FERRITIN: CPT

## 2025-08-25 PROCEDURE — 82652 VIT D 1 25-DIHYDROXY: CPT

## 2025-08-25 PROCEDURE — 83970 ASSAY OF PARATHORMONE: CPT

## 2025-08-25 PROCEDURE — 82043 UR ALBUMIN QUANTITATIVE: CPT

## 2025-08-25 PROCEDURE — 36415 COLL VENOUS BLD VENIPUNCTURE: CPT

## 2025-08-25 PROCEDURE — 84156 ASSAY OF PROTEIN URINE: CPT

## 2025-08-25 PROCEDURE — 80069 RENAL FUNCTION PANEL: CPT

## 2025-08-25 PROCEDURE — 82306 VITAMIN D 25 HYDROXY: CPT

## 2025-08-25 PROCEDURE — 83540 ASSAY OF IRON: CPT

## 2025-08-25 PROCEDURE — 85025 COMPLETE CBC W/AUTO DIFF WBC: CPT

## 2025-08-29 LAB — 1,25(OH)2D SERPL-MCNC: 28 PG/ML (ref 18–64)

## (undated) DEVICE — DRAPE VELYS DEVICE STERILE

## (undated) DEVICE — CAP THERMOLITE ADULT #TS5110-

## (undated) DEVICE — NDL MAGELLAN SAFETY 18G 1.5IN

## (undated) DEVICE — SOL IRRI STRL WATER 1000ML

## (undated) DEVICE — GLOVE SIGNATURE ESSNTL LTX 8

## (undated) DEVICE — DRAIN SINGLE ROUND 1/8 10F

## (undated) DEVICE — SPONGE GZ WOVEN 12-PLY 4X4IN

## (undated) DEVICE — DRESSING AQUACEL AG 3.5X10IN

## (undated) DEVICE — SOL NACL IRR 3000ML

## (undated) DEVICE — DRESSING TRANS 4X4 TEGADERM

## (undated) DEVICE — SET BASIN 48X48IN 6000ML RING

## (undated) DEVICE — SUT STRATAFIX 1PDS CTX 18IN

## (undated) DEVICE — BLANKET THERMOFLECT 4X7

## (undated) DEVICE — PENCIL SMOKE EVAC ROCKER 70MM

## (undated) DEVICE — GLOVE SENSICARE PI GRN 7

## (undated) DEVICE — SLEEVE SCD EXPRESS CALF LARGE

## (undated) DEVICE — CUFF TOURNIQUET DL PRT

## (undated) DEVICE — CUBE COLD THERAPY POLAR PAD

## (undated) DEVICE — WRAP COBAN NL STRL 4INX5YD

## (undated) DEVICE — GLOVE SENSICARE PI SURG 6.5

## (undated) DEVICE — DRAPE T EXTRM SURG 121X128X90

## (undated) DEVICE — PAD KNEE POLAR XL

## (undated) DEVICE — GLOVE SENSICARE PI GRN 7.5

## (undated) DEVICE — TOGA FLYTE PEEL AWAY XLARGE

## (undated) DEVICE — SOL IRR SOD CHL .9% POUR

## (undated) DEVICE — DURAPREP SURG SCRUB 26ML

## (undated) DEVICE — SYR 50CC LL

## (undated) DEVICE — POSITIONER HEEL FOAM CONVOLTD

## (undated) DEVICE — SUPPORT ULNA NERVE PROTECTOR

## (undated) DEVICE — Device

## (undated) DEVICE — VELYS ROBOT-ASSISTED SOLUTION ARRAY DRILL PIN 125 MM X 4 MM
Type: IMPLANTABLE DEVICE | Site: KNEE | Status: NON-FUNCTIONAL
Brand: VELYS
Removed: 2025-02-12

## (undated) DEVICE — BLADE POWERPICK 45D 1.5MM 13CM

## (undated) DEVICE — GLOVE SIGNATURE ESSNTL LTX 8.5

## (undated) DEVICE — SLEEVE ECLIPSE GOWN STRL 23IN

## (undated) DEVICE — SUT STRATAFIX 2-0 30CM

## (undated) DEVICE — BLADE SAW LG BONE 1.19X25X90MM

## (undated) DEVICE — VELYS ROBOT-ASSISTED SOLUTION ARRAY DRILL PIN 175 MM X 4 MM
Type: IMPLANTABLE DEVICE | Site: KNEE | Status: NON-FUNCTIONAL
Brand: VELYS
Removed: 2025-02-12

## (undated) DEVICE — DRAPE VELYS SATELLITE STATION

## (undated) DEVICE — SET VELYS PURESIGHT ARRAY KNEE

## (undated) DEVICE — BLADE VELYS OSCILLATING SAW

## (undated) DEVICE — KIT PULSAVAC PLUS HIP

## (undated) DEVICE — PACK TOTAL JOINT ACADIA

## (undated) DEVICE — GLOVE SENSICARE PI GRN 8

## (undated) DEVICE — GLOVE SENSICARE NEOPRENE 6.5

## (undated) DEVICE — GLOVE SIGNATURE ESSNTL LTX 6.5

## (undated) DEVICE — BANDAGE ESMARK ELASTIC ST 6X9

## (undated) DEVICE — GLOVE SENSICARE PI SLT 7.5

## (undated) DEVICE — LINER BOOT TOTAL KNEE STRL